# Patient Record
Sex: MALE | Race: WHITE | NOT HISPANIC OR LATINO | Employment: OTHER | URBAN - METROPOLITAN AREA
[De-identification: names, ages, dates, MRNs, and addresses within clinical notes are randomized per-mention and may not be internally consistent; named-entity substitution may affect disease eponyms.]

---

## 2018-01-03 ENCOUNTER — HOSPITAL ENCOUNTER (EMERGENCY)
Facility: HOSPITAL | Age: 83
Discharge: HOME | End: 2018-01-04
Attending: EMERGENCY MEDICINE
Payer: MEDICARE
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
## 2018-01-03 DIAGNOSIS — R55 SYNCOPE: ICD-10-CM

## 2018-01-03 DIAGNOSIS — E11.9 DM (DIABETES MELLITUS), TYPE 2: ICD-10-CM
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
## 2018-01-03 PROCEDURE — 99285 EMERGENCY DEPT VISIT HI MDM: CPT | Mod: 25

## 2018-01-03 PROCEDURE — 93005 ELECTROCARDIOGRAM TRACING: CPT

## 2018-01-03 PROCEDURE — 99285 EMERGENCY DEPT VISIT HI MDM: CPT | Mod: ,,, | Performed by: EMERGENCY MEDICINE

## 2018-01-03 PROCEDURE — 93010 ELECTROCARDIOGRAM REPORT: CPT | Mod: ,,, | Performed by: INTERNAL MEDICINE

## 2018-01-04 VITALS
SYSTOLIC BLOOD PRESSURE: 137 MMHG | DIASTOLIC BLOOD PRESSURE: 63 MMHG | OXYGEN SATURATION: 94 % | HEART RATE: 91 BPM | WEIGHT: 200 LBS | TEMPERATURE: 99 F | RESPIRATION RATE: 18 BRPM | BODY MASS INDEX: 27.09 KG/M2 | HEIGHT: 72 IN

## 2018-01-04 LAB
ANION GAP SERPL CALC-SCNC: 9 MMOL/L
BASOPHILS # BLD AUTO: 0.02 K/UL
BASOPHILS NFR BLD: 0.3 %
BILIRUB UR QL STRIP: NEGATIVE
BNP SERPL-MCNC: 241 PG/ML
BUN SERPL-MCNC: 28 MG/DL
CALCIUM SERPL-MCNC: 8.6 MG/DL
CHLORIDE SERPL-SCNC: 108 MMOL/L
CLARITY UR REFRACT.AUTO: CLEAR
CO2 SERPL-SCNC: 23 MMOL/L
COLOR UR AUTO: YELLOW
CREAT SERPL-MCNC: 1.1 MG/DL
DIFFERENTIAL METHOD: ABNORMAL
EOSINOPHIL # BLD AUTO: 0.1 K/UL
EOSINOPHIL NFR BLD: 1.5 %
ERYTHROCYTE [DISTWIDTH] IN BLOOD BY AUTOMATED COUNT: 13.5 %
EST. GFR  (AFRICAN AMERICAN): >60 ML/MIN/1.73 M^2
EST. GFR  (NON AFRICAN AMERICAN): >60 ML/MIN/1.73 M^2
GLUCOSE SERPL-MCNC: 103 MG/DL
GLUCOSE UR QL STRIP: NEGATIVE
HCT VFR BLD AUTO: 41.2 %
HGB BLD-MCNC: 14 G/DL
HGB UR QL STRIP: NEGATIVE
HYALINE CASTS UR QL AUTO: 3 /LPF
IMM GRANULOCYTES # BLD AUTO: 0.04 K/UL
IMM GRANULOCYTES NFR BLD AUTO: 0.5 %
KETONES UR QL STRIP: ABNORMAL
LEUKOCYTE ESTERASE UR QL STRIP: NEGATIVE
LYMPHOCYTES # BLD AUTO: 1 K/UL
LYMPHOCYTES NFR BLD: 13.3 %
MCH RBC QN AUTO: 32 PG
MCHC RBC AUTO-ENTMCNC: 34 G/DL
MCV RBC AUTO: 94 FL
MICROSCOPIC COMMENT: ABNORMAL
MONOCYTES # BLD AUTO: 0.9 K/UL
MONOCYTES NFR BLD: 11.9 %
NEUTROPHILS # BLD AUTO: 5.3 K/UL
NEUTROPHILS NFR BLD: 72.5 %
NITRITE UR QL STRIP: NEGATIVE
NRBC BLD-RTO: 0 /100 WBC
PH UR STRIP: 7 [PH] (ref 5–8)
PLATELET # BLD AUTO: 113 K/UL
PMV BLD AUTO: 11.2 FL
POTASSIUM SERPL-SCNC: 3.6 MMOL/L
PROT UR QL STRIP: NEGATIVE
RBC # BLD AUTO: 4.37 M/UL
RBC #/AREA URNS AUTO: 1 /HPF (ref 0–4)
SODIUM SERPL-SCNC: 140 MMOL/L
SP GR UR STRIP: 1.02 (ref 1–1.03)
URN SPEC COLLECT METH UR: ABNORMAL
UROBILINOGEN UR STRIP-ACNC: 2 EU/DL
WBC # BLD AUTO: 7.31 K/UL
WBC #/AREA URNS AUTO: 2 /HPF (ref 0–5)

## 2018-01-04 PROCEDURE — 25000003 PHARM REV CODE 250: Performed by: EMERGENCY MEDICINE

## 2018-01-04 PROCEDURE — 83880 ASSAY OF NATRIURETIC PEPTIDE: CPT

## 2018-01-04 PROCEDURE — 80048 BASIC METABOLIC PNL TOTAL CA: CPT

## 2018-01-04 PROCEDURE — 85025 COMPLETE CBC W/AUTO DIFF WBC: CPT

## 2018-01-04 PROCEDURE — 81001 URINALYSIS AUTO W/SCOPE: CPT

## 2018-01-04 RX ORDER — CARBIDOPA AND LEVODOPA 25; 100 MG/1; MG/1
1 TABLET, EXTENDED RELEASE ORAL 2 TIMES DAILY
Status: DISCONTINUED | OUTPATIENT
Start: 2018-01-04 | End: 2018-01-04

## 2018-01-04 RX ORDER — PRAMIPEXOLE DIHYDROCHLORIDE 1 MG/1
1 TABLET ORAL ONCE
Status: COMPLETED | OUTPATIENT
Start: 2018-01-04 | End: 2018-01-04

## 2018-01-04 RX ORDER — CARBIDOPA AND LEVODOPA 25; 100 MG/1; MG/1
1 TABLET, EXTENDED RELEASE ORAL ONCE
Status: COMPLETED | OUTPATIENT
Start: 2018-01-04 | End: 2018-01-04

## 2018-01-04 RX ORDER — PRAMIPEXOLE DIHYDROCHLORIDE 1 MG/1
1 TABLET ORAL 3 TIMES DAILY
Status: DISCONTINUED | OUTPATIENT
Start: 2018-01-04 | End: 2018-01-04

## 2018-01-04 RX ADMIN — PRAMIPEXOLE DIHYDROCHLORIDE 1 MG: 1 TABLET ORAL at 02:01

## 2018-01-04 RX ADMIN — CARBIDOPA AND LEVODOPA 1 TABLET: 25; 100 TABLET, EXTENDED RELEASE ORAL at 02:01

## 2018-01-04 NOTE — ED NOTES
"Pt arrive via EMS with C-collar in place. Pt reports fall but does not recall how long was on the floor. Per pt's daughter, the pt was found on the floor by the pt's son and pt c/o of head pain. Pt denies head pain currently but endorses back pain. Pt speech is difficult to understand, pt's daughter states "he also is missing his teeth so that is why he sounds that way". Pt able to state current month, name, and city.   "

## 2018-01-04 NOTE — ED PROVIDER NOTES
Encounter Date: 1/3/2018    SCRIBE #1 NOTE: I, Elizabeth Krishnamurthy, am scribing for, and in the presence of,  DAVE Louie have scribed the entire note.       History     Chief Complaint   Patient presents with    Loss of Consciousness     Family reports more confusion today and syncopal episode at home, no trauma noted. AAOx4 at this time. Pt reporting generalized chronic pain.     Time patient was seen by the provider: 12:15 AM      The patient is a 87 y.o. male with hx of: Afib, Diabetes mellitus, type 2, HLD, HTN, Parkinson disease, who presents to the ED with a complaint of fall from last night. Patient states he fell and hit the back if his head and had loss of consciousness. Patient reports of having head pain and leg swelling bilaterally, denies neck pain. Daughter of the patient reports that he had more confusion along with syncopal episode last night at home before the fall and has fallen multiple times in the past. Patient lives with his son and son's wife. Patient is placed in cervical collar.          The history is provided by the patient, medical records and a relative.     Review of patient's allergies indicates:   Allergen Reactions    Demerol [meperidine]      Past Medical History:   Diagnosis Date    *Atrial fibrillation     AI (aortic insufficiency)     mild    Atrial fibrillation     Diabetes mellitus     Diabetes mellitus, type 2     Hyperlipidemia     Hypertension     Memory loss     Parkinson disease     Spinal stenosis memory loss     Past Surgical History:   Procedure Laterality Date    CHOLECYSTECTOMY       No family history on file.  Social History   Substance Use Topics    Smoking status: Never Smoker    Smokeless tobacco: Never Used    Alcohol use No     Review of Systems   Constitutional: Negative for fever.   HENT: Negative for congestion.    Eyes: Negative for visual disturbance.   Respiratory: Negative for shortness of breath.    Cardiovascular: Positive for leg  swelling (bilaterally).   Gastrointestinal: Negative for abdominal pain.   Genitourinary: Negative for flank pain.   Musculoskeletal: Negative for neck pain.        (+) Head pain    Skin: Negative for rash.   Neurological: Positive for syncope.        (+) Loss of consciousness   Psychiatric/Behavioral: Positive for confusion.       Physical Exam     Initial Vitals [01/03/18 1949]   BP Pulse Resp Temp SpO2   (!) 148/98 86 18 99.1 °F (37.3 °C) 97 %      MAP       114.67         Physical Exam    Nursing note and vitals reviewed.  HENT:   Head: Normocephalic.   Eyes: EOM are normal.   Cardiovascular: Normal rate and regular rhythm.   Pulmonary/Chest: Breath sounds normal.   Abdominal: Soft.   Musculoskeletal: Normal range of motion.   (+) 2+ bilateral pitting edema and lower extremities up to mid shin    Neurological:   (+) Tremor noted on extremities, equal strength bilaterally in upper and lower extremities and all extremities with no limb drift   Skin: No rash noted.         ED Course   Procedures  Labs Reviewed   BASIC METABOLIC PANEL - Abnormal; Notable for the following:        Result Value    BUN, Bld 28 (*)     Calcium 8.6 (*)     All other components within normal limits   CBC W/ AUTO DIFFERENTIAL - Abnormal; Notable for the following:     RBC 4.37 (*)     MCH 32.0 (*)     Platelets 113 (*)     Lymph% 13.3 (*)     All other components within normal limits   URINALYSIS, REFLEX TO URINE CULTURE - Abnormal; Notable for the following:     Ketones, UA 1+ (*)     All other components within normal limits    Narrative:     Preferred Collection Type->Urine, Clean Catch   URINALYSIS MICROSCOPIC - Abnormal; Notable for the following:     Hyaline Casts, UA 3 (*)     All other components within normal limits    Narrative:     Preferred Collection Type->Urine, Clean Catch   B-TYPE NATRIURETIC PEPTIDE - Abnormal; Notable for the following:      (*)     All other components within normal limits   B-TYPE NATRIURETIC  PEPTIDE             Medical Decision Making:   History:   Old Medical Records: I decided to obtain old medical records.  Initial Assessment:   87 y.o. Male patient after fall with possible LOC, also with swelling on lower extremities, unclear etiology. Vital signs are stable. Patient mentation is at baseline. CT head and neck are negative for acute injury, x-ray of chest and pelvis no fracture or other injuries noted. Metallic object on pelvis x-ray found folded pocket knife sitting behind the patient. BNP is intermittent range, labs otherwise stable from previous other than BNP. Patient has no other symptoms of CHF(stable spO2, no dyspnea, normal CXR) and no admission needed for diuresis. Given no other injury and several children able to take care of him, patient is stable for discharge. Family has agreed to take him home.  Advised pt to follow up with PCP or return if concerning symptoms arise. Pt understands and agrees with plan. Will d/c home.          Independently Interpreted Test(s):   I have ordered and independently interpreted X-rays - see prior notes.  I have ordered and independently interpreted EKG Reading(s) - see prior notes  Clinical Tests:   Lab Tests: Ordered and Reviewed  Radiological Study: Ordered and Reviewed  Medical Tests: Ordered and Reviewed            Scribe Attestation:   Scribe #1: I performed the above scribed service and the documentation accurately describes the services I performed. I attest to the accuracy of the note.            ED Course      Clinical Impression:   Diagnoses of Syncope, Fall, Fall, Fall, and Fall were pertinent to this visit.    Disposition:   Disposition: Discharged  Condition: Stable                        Grey Gacria MD  01/04/18 0619

## 2018-01-04 NOTE — PROVIDER PROGRESS NOTES - EMERGENCY DEPT.
Encounter Date: 1/3/2018    ED Physician Progress Notes         EKG - STEMI Decision  STEMI: Taking into account all the artifacts and bad EKG because of pt's movement, no STEMI present.    I, Gera Vu, am scribing for, and in the presence of, Dr. Richter. I performed the above scribed service and the documentation accurately describes the services I performed. I attest to the accuracy of the note.

## 2018-01-04 NOTE — ED NOTES
Patient identifiers verified and correct for Silvano Parmar.    LOC: The patient is awake, alert and aware of environment with an appropriate affect, the patient is oriented x 3. Speech is difficult to understand.   APPEARANCE: Patient resting comfortably and in no acute distress, patient is clean and well groomed, patient's clothing is properly fastened.  SKIN: The skin is warm and dry, color consistent with ethnicity, patient has normal skin turgor and moist mucus membranes, skin intact, no breakdown or bruising noted.  MUSCULOSKELETAL: Patient moving all extremities spontaneously, no obvious swelling or deformities noted.  RESPIRATORY: Airway is open and patent, respirations are spontaneous, patient has a normal effort and rate, no accessory muscle use noted  CARDIAC: Patient has a normal rate and regular rhythm, BLE swelling and warm to touch, capillary refill < 3 seconds.  ABDOMEN: Soft and non tender to palpation, no distention noted, normoactive bowel sounds present in all four quadrants.  NEUROLOGIC: PERRL, 3mm bilaterally, eyes open spontaneously, behavior appropriate to situation, follows commands, facial expression symmetrical, bilateral hand grasp equal and even, purposeful motor response noted, normal sensation in all extremities when touched with a finger.

## 2018-01-04 NOTE — ED NOTES
Waiting on pt's son-in-law to arrive to assist pt into house. Pt unable to stand on own or hold self up. Charge nurse aware.

## 2018-05-02 ENCOUNTER — OFFICE VISIT (OUTPATIENT)
Dept: UROLOGY | Facility: CLINIC | Age: 83
End: 2018-05-02
Attending: UROLOGY
Payer: MEDICARE

## 2018-05-02 VITALS
WEIGHT: 204.38 LBS | SYSTOLIC BLOOD PRESSURE: 118 MMHG | HEART RATE: 42 BPM | DIASTOLIC BLOOD PRESSURE: 66 MMHG | BODY MASS INDEX: 27.68 KG/M2 | HEIGHT: 72 IN

## 2018-05-02 DIAGNOSIS — N40.1 BENIGN NON-NODULAR PROSTATIC HYPERPLASIA WITH LOWER URINARY TRACT SYMPTOMS: Primary | ICD-10-CM

## 2018-05-02 LAB
BILIRUB SERPL-MCNC: ABNORMAL MG/DL
BLOOD URINE, POC: ABNORMAL
COLOR, POC UA: YELLOW
GLUCOSE UR QL STRIP: ABNORMAL
KETONES UR QL STRIP: ABNORMAL
LEUKOCYTE ESTERASE URINE, POC: ABNORMAL
NITRITE, POC UA: ABNORMAL
PH, POC UA: 5
PROTEIN, POC: ABNORMAL
SPECIFIC GRAVITY, POC UA: 1.02
UROBILINOGEN, POC UA: 1

## 2018-05-02 PROCEDURE — 99203 OFFICE O/P NEW LOW 30 MIN: CPT | Mod: 25,S$GLB,, | Performed by: UROLOGY

## 2018-05-02 PROCEDURE — 81002 URINALYSIS NONAUTO W/O SCOPE: CPT | Mod: S$GLB,,, | Performed by: UROLOGY

## 2018-05-02 RX ORDER — TAMSULOSIN HYDROCHLORIDE 0.4 MG/1
0.4 CAPSULE ORAL DAILY
Qty: 90 CAPSULE | Refills: 3 | Status: SHIPPED | OUTPATIENT
Start: 2018-05-02 | End: 2019-05-29 | Stop reason: SDUPTHER

## 2018-05-02 NOTE — PROGRESS NOTES
Subjective:      Silvano Parmar is a 87 y.o. male who was self-referred for evaluation of BPH.      He previously saw Dr. Santo.    PMHx significant for Parkinson's disease.    He is on flomax for BPH.     He denies bothersome LUTS today. Has nocturia x2. He does wear a pad for urinary incontinence, but son does not think he leaks every day.     The following portions of the patient's history were reviewed and updated as appropriate: allergies, current medications, past family history, past medical history, past social history, past surgical history and problem list.    Review of Systems  Constitutional: no fever or chills  ENT: no nasal congestion or sore throat  Respiratory: no cough or shortness of breath  Cardiovascular: no chest pain or palpitations  Gastrointestinal: no nausea or vomiting, tolerating diet  Genitourinary: as per HPI  Hematologic/Lymphatic: no easy bruising or lymphadenopathy  Musculoskeletal: no arthralgias or myalgias  Behavioral/Psych: no auditory or visual hallucinations     Objective:   Vitals: /66 (BP Location: Left arm, Patient Position: Sitting, BP Method: Large (Automatic))   Pulse (!) 42   Ht 6' (1.829 m)   Wt 92.7 kg (204 lb 5.9 oz)   BMI 27.72 kg/m²     Physical Exam   General: alert and oriented, no acute distress  Head: normocephalic, atraumatic  Neck: supple, no lymphadenopathy, normal ROM, no masses  Respiratory: Symmetric expansion, non-labored breathing  Cardiovascular: regular rate and rhythm, nomal pulses, no peripheral edema  Skin: normal coloration and turgor, no rashes, no suspicious skin lesions noted  Neuro: alert and oriented x3, no gross deficits  Psych: normal judgment and insight, normal mood/affect and non-anxious    Lab Review   Urinalysis demonstrates negative for all components  Lab Results   Component Value Date    WBC 7.31 01/04/2018    HGB 14.0 01/04/2018    HCT 41.2 01/04/2018    MCV 94 01/04/2018     (L) 01/04/2018     Lab Results    Component Value Date    CREATININE 1.1 01/04/2018    BUN 28 (H) 01/04/2018       Assessment:     1. Benign non-nodular prostatic hyperplasia with lower urinary tract symptoms        Plan:   1. Continue flomax  2. FU 1 year

## 2018-11-09 ENCOUNTER — HOSPITAL ENCOUNTER (INPATIENT)
Facility: OTHER | Age: 83
LOS: 5 days | Discharge: NURSING, REHAB OR LONG-TERM CARE | DRG: 193 | End: 2018-11-14
Attending: EMERGENCY MEDICINE | Admitting: HOSPITALIST
Payer: MEDICARE

## 2018-11-09 DIAGNOSIS — J18.9 PNEUMONIA: ICD-10-CM

## 2018-11-09 DIAGNOSIS — J18.9 COMMUNITY ACQUIRED PNEUMONIA OF RIGHT UPPER LOBE OF LUNG: ICD-10-CM

## 2018-11-09 DIAGNOSIS — G20.A1 PARKINSON DISEASE: ICD-10-CM

## 2018-11-09 DIAGNOSIS — J18.9 PNEUMONIA OF RIGHT LUNG DUE TO INFECTIOUS ORGANISM, UNSPECIFIED PART OF LUNG: ICD-10-CM
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
## 2018-11-09 DIAGNOSIS — J96.01 ACUTE RESPIRATORY FAILURE WITH HYPOXIA: Primary | ICD-10-CM

## 2018-11-09 DIAGNOSIS — I38 ENDOCARDITIS: ICD-10-CM

## 2018-11-09 DIAGNOSIS — R78.81 BACTEREMIA: ICD-10-CM

## 2018-11-09 PROBLEM — D69.3 CHRONIC IDIOPATHIC THROMBOCYTOPENIA: Status: ACTIVE | Noted: 2018-11-09

## 2018-11-09 PROBLEM — E83.42 HYPOMAGNESEMIA: Status: ACTIVE | Noted: 2018-11-09

## 2018-11-09 LAB
ALBUMIN SERPL BCP-MCNC: 3.3 G/DL
ALP SERPL-CCNC: 99 U/L
ALT SERPL W/O P-5'-P-CCNC: <5 U/L
ANION GAP SERPL CALC-SCNC: 8 MMOL/L
AST SERPL-CCNC: 34 U/L
BASOPHILS # BLD AUTO: 0.02 K/UL
BASOPHILS NFR BLD: 0.1 %
BILIRUB SERPL-MCNC: 1 MG/DL
BILIRUB UR QL STRIP: NEGATIVE
BNP SERPL-MCNC: 332 PG/ML
BUN SERPL-MCNC: 18 MG/DL
CALCIUM SERPL-MCNC: 8.9 MG/DL
CHLORIDE SERPL-SCNC: 106 MMOL/L
CK SERPL-CCNC: 183 U/L
CLARITY UR: CLEAR
CO2 SERPL-SCNC: 26 MMOL/L
COLOR UR: YELLOW
CREAT SERPL-MCNC: 1.2 MG/DL
DIFFERENTIAL METHOD: ABNORMAL
EOSINOPHIL # BLD AUTO: 0 K/UL
EOSINOPHIL NFR BLD: 0.2 %
ERYTHROCYTE [DISTWIDTH] IN BLOOD BY AUTOMATED COUNT: 14.2 %
EST. GFR  (AFRICAN AMERICAN): >60 ML/MIN/1.73 M^2
EST. GFR  (NON AFRICAN AMERICAN): 54 ML/MIN/1.73 M^2
GLUCOSE SERPL-MCNC: 152 MG/DL
GLUCOSE UR QL STRIP: NEGATIVE
HCT VFR BLD AUTO: 42.8 %
HGB BLD-MCNC: 14.2 G/DL
HGB UR QL STRIP: NEGATIVE
KETONES UR QL STRIP: NEGATIVE
LACTATE SERPL-SCNC: 1.5 MMOL/L
LEUKOCYTE ESTERASE UR QL STRIP: NEGATIVE
LYMPHOCYTES # BLD AUTO: 1.4 K/UL
LYMPHOCYTES NFR BLD: 7.8 %
MAGNESIUM SERPL-MCNC: 1.5 MG/DL
MCH RBC QN AUTO: 32.3 PG
MCHC RBC AUTO-ENTMCNC: 33.2 G/DL
MCV RBC AUTO: 98 FL
MONOCYTES # BLD AUTO: 0.3 K/UL
MONOCYTES NFR BLD: 1.9 %
NEUTROPHILS # BLD AUTO: 15.5 K/UL
NEUTROPHILS NFR BLD: 89.8 %
NITRITE UR QL STRIP: NEGATIVE
PH UR STRIP: 7 [PH] (ref 5–8)
PLATELET # BLD AUTO: 127 K/UL
PMV BLD AUTO: 11.8 FL
POCT GLUCOSE: 144 MG/DL (ref 70–110)
POTASSIUM SERPL-SCNC: 3.5 MMOL/L
PROT SERPL-MCNC: 6.6 G/DL
PROT UR QL STRIP: NEGATIVE
RBC # BLD AUTO: 4.39 M/UL
SODIUM SERPL-SCNC: 140 MMOL/L
SP GR UR STRIP: <=1.005 (ref 1–1.03)
URN SPEC COLLECT METH UR: ABNORMAL
UROBILINOGEN UR STRIP-ACNC: 1 EU/DL
WBC # BLD AUTO: 17.26 K/UL

## 2018-11-09 PROCEDURE — 25000003 PHARM REV CODE 250: Performed by: HOSPITALIST

## 2018-11-09 PROCEDURE — 93010 ELECTROCARDIOGRAM REPORT: CPT | Mod: ,,, | Performed by: INTERNAL MEDICINE

## 2018-11-09 PROCEDURE — 80053 COMPREHEN METABOLIC PANEL: CPT

## 2018-11-09 PROCEDURE — 87186 SC STD MICRODIL/AGAR DIL: CPT

## 2018-11-09 PROCEDURE — 83880 ASSAY OF NATRIURETIC PEPTIDE: CPT

## 2018-11-09 PROCEDURE — 85025 COMPLETE CBC W/AUTO DIFF WBC: CPT

## 2018-11-09 PROCEDURE — 11000001 HC ACUTE MED/SURG PRIVATE ROOM

## 2018-11-09 PROCEDURE — 93005 ELECTROCARDIOGRAM TRACING: CPT

## 2018-11-09 PROCEDURE — 99285 EMERGENCY DEPT VISIT HI MDM: CPT | Mod: 25

## 2018-11-09 PROCEDURE — 87040 BLOOD CULTURE FOR BACTERIA: CPT

## 2018-11-09 PROCEDURE — 63600175 PHARM REV CODE 636 W HCPCS: Performed by: HOSPITALIST

## 2018-11-09 PROCEDURE — 96365 THER/PROPH/DIAG IV INF INIT: CPT

## 2018-11-09 PROCEDURE — 81003 URINALYSIS AUTO W/O SCOPE: CPT

## 2018-11-09 PROCEDURE — 83735 ASSAY OF MAGNESIUM: CPT

## 2018-11-09 PROCEDURE — 99900035 HC TECH TIME PER 15 MIN (STAT)

## 2018-11-09 PROCEDURE — 25000003 PHARM REV CODE 250: Performed by: EMERGENCY MEDICINE

## 2018-11-09 PROCEDURE — 94761 N-INVAS EAR/PLS OXIMETRY MLT: CPT

## 2018-11-09 PROCEDURE — 99223 1ST HOSP IP/OBS HIGH 75: CPT | Mod: AI,,, | Performed by: HOSPITALIST

## 2018-11-09 PROCEDURE — 63600175 PHARM REV CODE 636 W HCPCS: Performed by: EMERGENCY MEDICINE

## 2018-11-09 PROCEDURE — 87449 NOS EACH ORGANISM AG IA: CPT

## 2018-11-09 PROCEDURE — 82550 ASSAY OF CK (CPK): CPT

## 2018-11-09 PROCEDURE — 83605 ASSAY OF LACTIC ACID: CPT

## 2018-11-09 RX ORDER — BENZONATATE 100 MG/1
100 CAPSULE ORAL 3 TIMES DAILY PRN
Status: DISCONTINUED | OUTPATIENT
Start: 2018-11-09 | End: 2018-11-15 | Stop reason: HOSPADM

## 2018-11-09 RX ORDER — PRAMIPEXOLE DIHYDROCHLORIDE 0.5 MG/1
1 TABLET ORAL 3 TIMES DAILY
Status: DISCONTINUED | OUTPATIENT
Start: 2018-11-09 | End: 2018-11-15 | Stop reason: HOSPADM

## 2018-11-09 RX ORDER — ENOXAPARIN SODIUM 100 MG/ML
40 INJECTION SUBCUTANEOUS EVERY 24 HOURS
Status: DISCONTINUED | OUTPATIENT
Start: 2018-11-09 | End: 2018-11-10

## 2018-11-09 RX ORDER — ACETAMINOPHEN 325 MG/1
650 TABLET ORAL EVERY 4 HOURS PRN
Status: DISCONTINUED | OUTPATIENT
Start: 2018-11-09 | End: 2018-11-15 | Stop reason: HOSPADM

## 2018-11-09 RX ORDER — TAMSULOSIN HYDROCHLORIDE 0.4 MG/1
0.4 CAPSULE ORAL DAILY
Status: DISCONTINUED | OUTPATIENT
Start: 2018-11-10 | End: 2018-11-15 | Stop reason: HOSPADM

## 2018-11-09 RX ORDER — DILTIAZEM HYDROCHLORIDE 120 MG/1
120 CAPSULE, COATED, EXTENDED RELEASE ORAL DAILY
Status: DISCONTINUED | OUTPATIENT
Start: 2018-11-10 | End: 2018-11-15 | Stop reason: HOSPADM

## 2018-11-09 RX ORDER — PANTOPRAZOLE SODIUM 40 MG/1
40 TABLET, DELAYED RELEASE ORAL DAILY
Status: DISCONTINUED | OUTPATIENT
Start: 2018-11-10 | End: 2018-11-15 | Stop reason: HOSPADM

## 2018-11-09 RX ORDER — ASPIRIN 325 MG
325 TABLET, DELAYED RELEASE (ENTERIC COATED) ORAL DAILY
Status: DISCONTINUED | OUTPATIENT
Start: 2018-11-10 | End: 2018-11-15 | Stop reason: HOSPADM

## 2018-11-09 RX ORDER — GUAIFENESIN 600 MG/1
600 TABLET, EXTENDED RELEASE ORAL 2 TIMES DAILY
Status: DISCONTINUED | OUTPATIENT
Start: 2018-11-09 | End: 2018-11-15 | Stop reason: HOSPADM

## 2018-11-09 RX ORDER — CARBIDOPA AND LEVODOPA 25; 100 MG/1; MG/1
2 TABLET ORAL 3 TIMES DAILY
Status: DISCONTINUED | OUTPATIENT
Start: 2018-11-09 | End: 2018-11-15 | Stop reason: HOSPADM

## 2018-11-09 RX ORDER — HYDRALAZINE HYDROCHLORIDE 20 MG/ML
15 INJECTION INTRAMUSCULAR; INTRAVENOUS EVERY 4 HOURS PRN
Status: DISCONTINUED | OUTPATIENT
Start: 2018-11-09 | End: 2018-11-15 | Stop reason: HOSPADM

## 2018-11-09 RX ORDER — IBUPROFEN 200 MG
24 TABLET ORAL
Status: DISCONTINUED | OUTPATIENT
Start: 2018-11-09 | End: 2018-11-15 | Stop reason: HOSPADM

## 2018-11-09 RX ORDER — AZITHROMYCIN 250 MG/1
500 TABLET, FILM COATED ORAL
Status: COMPLETED | OUTPATIENT
Start: 2018-11-09 | End: 2018-11-09

## 2018-11-09 RX ORDER — SODIUM CHLORIDE 0.9 % (FLUSH) 0.9 %
5 SYRINGE (ML) INJECTION
Status: DISCONTINUED | OUTPATIENT
Start: 2018-11-09 | End: 2018-11-15 | Stop reason: HOSPADM

## 2018-11-09 RX ORDER — MAGNESIUM SULFATE HEPTAHYDRATE 40 MG/ML
2 INJECTION, SOLUTION INTRAVENOUS ONCE
Status: COMPLETED | OUTPATIENT
Start: 2018-11-09 | End: 2018-11-09

## 2018-11-09 RX ORDER — PRAMIPEXOLE DIHYDROCHLORIDE 0.5 MG/1
1 TABLET ORAL
Status: COMPLETED | OUTPATIENT
Start: 2018-11-09 | End: 2018-11-09

## 2018-11-09 RX ORDER — GLUCAGON 1 MG
1 KIT INJECTION
Status: DISCONTINUED | OUTPATIENT
Start: 2018-11-09 | End: 2018-11-15 | Stop reason: HOSPADM

## 2018-11-09 RX ORDER — LOSARTAN POTASSIUM 50 MG/1
50 TABLET ORAL DAILY
Status: DISCONTINUED | OUTPATIENT
Start: 2018-11-10 | End: 2018-11-15 | Stop reason: HOSPADM

## 2018-11-09 RX ORDER — ACETAMINOPHEN 325 MG/1
650 TABLET ORAL EVERY 8 HOURS PRN
Status: DISCONTINUED | OUTPATIENT
Start: 2018-11-09 | End: 2018-11-15 | Stop reason: HOSPADM

## 2018-11-09 RX ORDER — ALBUTEROL SULFATE 0.83 MG/ML
2.5 SOLUTION RESPIRATORY (INHALATION) EVERY 4 HOURS PRN
Status: DISCONTINUED | OUTPATIENT
Start: 2018-11-09 | End: 2018-11-09 | Stop reason: CLARIF

## 2018-11-09 RX ORDER — GABAPENTIN 300 MG/1
300 CAPSULE ORAL 2 TIMES DAILY
Status: DISCONTINUED | OUTPATIENT
Start: 2018-11-09 | End: 2018-11-15 | Stop reason: HOSPADM

## 2018-11-09 RX ORDER — INSULIN ASPART 100 [IU]/ML
0-5 INJECTION, SOLUTION INTRAVENOUS; SUBCUTANEOUS
Status: DISCONTINUED | OUTPATIENT
Start: 2018-11-09 | End: 2018-11-15 | Stop reason: HOSPADM

## 2018-11-09 RX ORDER — CARBIDOPA AND LEVODOPA 25; 100 MG/1; MG/1
2 TABLET ORAL
Status: COMPLETED | OUTPATIENT
Start: 2018-11-09 | End: 2018-11-09

## 2018-11-09 RX ORDER — FLUTICASONE PROPIONATE 50 MCG
1 SPRAY, SUSPENSION (ML) NASAL DAILY
Status: DISCONTINUED | OUTPATIENT
Start: 2018-11-10 | End: 2018-11-15 | Stop reason: HOSPADM

## 2018-11-09 RX ORDER — FUROSEMIDE 20 MG/1
20 TABLET ORAL DAILY
Status: DISCONTINUED | OUTPATIENT
Start: 2018-11-10 | End: 2018-11-15 | Stop reason: HOSPADM

## 2018-11-09 RX ORDER — ALBUTEROL SULFATE 0.83 MG/ML
2.5 SOLUTION RESPIRATORY (INHALATION) EVERY 4 HOURS PRN
Status: DISCONTINUED | OUTPATIENT
Start: 2018-11-09 | End: 2018-11-15 | Stop reason: HOSPADM

## 2018-11-09 RX ORDER — IBUPROFEN 200 MG
16 TABLET ORAL
Status: DISCONTINUED | OUTPATIENT
Start: 2018-11-09 | End: 2018-11-15 | Stop reason: HOSPADM

## 2018-11-09 RX ADMIN — ENOXAPARIN SODIUM 40 MG: 100 INJECTION SUBCUTANEOUS at 06:11

## 2018-11-09 RX ADMIN — PRAMIPEXOLE DIHYDROCHLORIDE 1 MG: 0.5 TABLET ORAL at 01:11

## 2018-11-09 RX ADMIN — CARBIDOPA AND LEVODOPA 2 TABLET: 25; 100 TABLET ORAL at 09:11

## 2018-11-09 RX ADMIN — GABAPENTIN 300 MG: 300 CAPSULE ORAL at 09:11

## 2018-11-09 RX ADMIN — CEFTRIAXONE 1 G: 1 INJECTION, SOLUTION INTRAVENOUS at 03:11

## 2018-11-09 RX ADMIN — CARBIDOPA AND LEVODOPA 2 TABLET: 25; 100 TABLET ORAL at 01:11

## 2018-11-09 RX ADMIN — AZITHROMYCIN 500 MG: 250 TABLET, FILM COATED ORAL at 03:11

## 2018-11-09 RX ADMIN — GUAIFENESIN 600 MG: 600 TABLET, EXTENDED RELEASE ORAL at 09:11

## 2018-11-09 RX ADMIN — MAGNESIUM SULFATE IN WATER 2 G: 40 INJECTION, SOLUTION INTRAVENOUS at 06:11

## 2018-11-09 RX ADMIN — PRAMIPEXOLE DIHYDROCHLORIDE 1 MG: 0.5 TABLET ORAL at 09:11

## 2018-11-09 NOTE — ASSESSMENT & PLAN NOTE
-History noted.  Previously was on anticoagulation but due to many falls and at least one subdural hematoma anticoagulation was discontinued and felt unsafe for him  -Monitor on tele  -Continue home diltiazem

## 2018-11-09 NOTE — SUBJECTIVE & OBJECTIVE
Past Medical History:   Diagnosis Date    *Atrial fibrillation     AI (aortic insufficiency)     mild    Atrial fibrillation     Diabetes mellitus     Diabetes mellitus, type 2     Hyperlipidemia     Hypertension     Memory loss     Parkinson disease     Spinal stenosis memory loss       Past Surgical History:   Procedure Laterality Date    CHOLECYSTECTOMY         Review of patient's allergies indicates:   Allergen Reactions    Demerol [meperidine]        No current facility-administered medications on file prior to encounter.      Current Outpatient Medications on File Prior to Encounter   Medication Sig    aspirin (ECOTRIN) 325 MG EC tablet Take 1 tablet (325 mg total) by mouth once daily.    carbidopa-levodopa  mg (SINEMET)  mg per tablet Take 2 tablets by mouth 3 (three) times daily.     cholecalciferol, vitamin D3, (VITAMIN D3) 2,000 unit Tab Take 2 tablets by mouth once daily.     esomeprazole (NEXIUM) 40 MG capsule Take 22.3 mg by mouth before breakfast.     furosemide (LASIX) 20 MG tablet TAKE 1 TABLET BY MOUTH EVERY DAY    gabapentin (NEURONTIN) 300 MG capsule Take 1 capsule (300 mg total) by mouth 2 (two) times daily.    meloxicam (MOBIC) 15 MG tablet TAKE 1 TABLET(15 MG) BY MOUTH EVERY DAY FOR BACK OR LEG PAIN    pramipexole (MIRAPEX) 1 MG tablet Take 1 tablet by mouth 3 (three) times daily.    SITagliptan (JANUVIA) 50 MG Tab Take 1 tablet (50 mg total) by mouth once daily.    diltiaZEM (CARDIZEM CD) 120 MG Cp24 TAKE ONE CAPSULE BY MOUTH ONCE DAILY    fluticasone (FLONASE) 50 mcg/actuation nasal spray 1 spray (50 mcg total) by Each Nare route once daily.    FOLIC ACID/MULTIVITS-MIN/LUT (CENTRUM SILVER ORAL) Take 1 tablet by mouth once daily.    GENERLAC 10 gram/15 mL solution     losartan (COZAAR) 50 MG tablet TAKE 1 TABLET(50 MG) BY MOUTH EVERY DAY    MUCINEX DM  mg per 12 hr tablet TK UTD    tamsulosin (FLOMAX) 0.4 mg Cp24 Take 1 capsule (0.4 mg total) by  mouth once daily.     Family History     None        Tobacco Use    Smoking status: Never Smoker    Smokeless tobacco: Never Used   Substance and Sexual Activity    Alcohol use: No    Drug use: No    Sexual activity: No     Review of Systems   Constitutional: Negative for activity change and appetite change.   HENT: Negative for congestion, dental problem and trouble swallowing.    Eyes: Negative for discharge and itching.   Respiratory: Negative for apnea and chest tightness.    Cardiovascular: Positive for leg swelling. Negative for chest pain and palpitations.   Gastrointestinal: Negative for abdominal distention and abdominal pain.   Endocrine: Negative for cold intolerance and heat intolerance.   Genitourinary: Negative for difficulty urinating and dysuria.   Musculoskeletal: Positive for back pain. Negative for arthralgias.   Neurological: Negative for dizziness and facial asymmetry.   Psychiatric/Behavioral: Negative for agitation and behavioral problems.     Objective:     Vital Signs (Most Recent):  Temp: 98.3 °F (36.8 °C) (11/09/18 1318)  Pulse: 68 (11/09/18 1507)  Resp: 20 (11/09/18 1411)  BP: (!) 177/79 (11/09/18 1439)  SpO2: (!) 94 % (11/09/18 1507) Vital Signs (24h Range):  Temp:  [98.2 °F (36.8 °C)-98.3 °F (36.8 °C)] 98.3 °F (36.8 °C)  Pulse:  [68-72] 68  Resp:  [20-28] 20  SpO2:  [86 %-95 %] 94 %  BP: (119-177)/(75-85) 177/79     Weight: 97.5 kg (215 lb)  Body mass index is 29.16 kg/m².    Physical Exam   Constitutional: He is oriented to person, place, and time. He appears well-developed and well-nourished.   HENT:   Head: Normocephalic and atraumatic.   Eyes: EOM are normal. Pupils are equal, round, and reactive to light.   Neck: Normal range of motion. Neck supple.   Cardiovascular: Normal rate, regular rhythm and normal heart sounds.   Pulmonary/Chest: Effort normal. No respiratory distress.   Mild course sounds in right upper lobe.  No wheezes.  Overall good air movement.   Abdominal: Soft.  Bowel sounds are normal. He exhibits no distension. There is no tenderness.   Musculoskeletal: Normal range of motion. He exhibits edema.   Significant chronic bilateral lower extremity edema   Neurological: He is oriented to person, place, and time. No cranial nerve deficit. Coordination normal.   Slight tremor and mild dysarthria but at baseline per son at bedside.  Moves all extremities with good strength.   Skin: Skin is warm and dry.   Psychiatric: He has a normal mood and affect. His behavior is normal.   Vitals reviewed.        CRANIAL NERVES     CN III, IV, VI   Pupils are equal, round, and reactive to light.  Extraocular motions are normal.        Significant Labs:   BMP:   Recent Labs   Lab 11/09/18  1156   *      K 3.5      CO2 26   BUN 18   CREATININE 1.2   CALCIUM 8.9   MG 1.5*     CBC:   Recent Labs   Lab 11/09/18  1156   WBC 17.26*   HGB 14.2   HCT 42.8   *     CMP:   Recent Labs   Lab 11/09/18  1156      K 3.5      CO2 26   *   BUN 18   CREATININE 1.2   CALCIUM 8.9   PROT 6.6   ALBUMIN 3.3*   BILITOT 1.0   ALKPHOS 99   AST 34   ALT <5*   ANIONGAP 8   EGFRNONAA 54*     Lactic Acid:   Recent Labs   Lab 11/09/18  1419   LACTATE 1.5     Magnesium:   Recent Labs   Lab 11/09/18  1156   MG 1.5*     Urine Studies:   Recent Labs   Lab 11/09/18  1340   COLORU Yellow   APPEARANCEUA Clear   PHUR 7.0   SPECGRAV <=1.005*   PROTEINUA Negative   GLUCUA Negative   KETONESU Negative   BILIRUBINUA Negative   OCCULTUA Negative   NITRITE Negative   UROBILINOGEN 1.0   LEUKOCYTESUR Negative       Significant Imaging: I have reviewed and interpreted all pertinent imaging results/findings within the past 24 hours.

## 2018-11-09 NOTE — ED NOTES
Pt resting in bed with eyes open, respirations even and unlabored, appears in no acute distress. Family remains at bedside.

## 2018-11-09 NOTE — ED TRIAGE NOTES
Family reports pt had 3 witnessed falls today. Pt denies any falls. Family states that pt is unable to walk right now or use his legs. States that pt is usually able to ambulate with walker. Pt denies any complaints at this time. Pt currently orients to person, place, time. Answers all questions appropriately.

## 2018-11-09 NOTE — H&P
[facility] Medicine  History & Physical    Patient Name: Silvano Parmar  MRN: [id number]  Admission Date: 11/9/2018  Attending Physician: Silvano Mac MD   Primary Care Provider: Silvestre Ramirez MD         Patient information was obtained from patient, relative(s) and ER records.     Subjective:     Principal Problem:Community acquired pneumonia of right upper lobe of lung    Chief Complaint:   Chief Complaint   Patient presents with    Fall     pt with  multiple falls last pm         HPI: Mr. Parmar is an 88 year old man with a history of parkinsons disease, atrial fibrillation, diabetes, spinal stenosis and frequent falls who was found on the floor by his bed this morning at 6:30AM by the staff at [facility] where he resides.  He states he simply slid out of bed to the floor when getting out of be and was unable to get up off the floor.  He denied any injury.  Unfortunately, he was found on the floor again two other times today.  He has no recollection of how those occurred.  His son is at the bedside and informs me he has a long history of falls, and at one point suffered a small subdural hematoma.  In general Mr. Parmar ambulates with a walker.  His only complaint at this time is that he has a bit of an increase in right hip discomfort and right thorax discomfort.  He denies knowledge of having fallen on these areas today.He denies any fevers, cough, shortness of breath, loss of consciousness, headache, neck stiffness, palpitations or new weakness.      In the ER he was found to have a right upper lobe infiltrate on chest xray, a leukocytosis and mild asymptomatic hypoxia and is being admitted for treatment of community acquired pneumonia.    Past Medical History:   Diagnosis Date    *Atrial fibrillation     AI (aortic insufficiency)     mild    Atrial fibrillation     Diabetes mellitus     Diabetes mellitus, type 2     Hyperlipidemia     Hypertension     Memory  loss     Parkinson disease     Spinal stenosis memory loss       Past Surgical History:   Procedure Laterality Date    CHOLECYSTECTOMY         Review of patient's allergies indicates:   Allergen Reactions    Demerol [meperidine]        No current facility-administered medications on file prior to encounter.      Current Outpatient Medications on File Prior to Encounter   Medication Sig    aspirin (ECOTRIN) 325 MG EC tablet Take 1 tablet (325 mg total) by mouth once daily.    carbidopa-levodopa  mg (SINEMET)  mg per tablet Take 2 tablets by mouth 3 (three) times daily.     cholecalciferol, vitamin D3, (VITAMIN D3) 2,000 unit Tab Take 2 tablets by mouth once daily.     esomeprazole (NEXIUM) 40 MG capsule Take 22.3 mg by mouth before breakfast.     furosemide (LASIX) 20 MG tablet TAKE 1 TABLET BY MOUTH EVERY DAY    gabapentin (NEURONTIN) 300 MG capsule Take 1 capsule (300 mg total) by mouth 2 (two) times daily.    meloxicam (MOBIC) 15 MG tablet TAKE 1 TABLET(15 MG) BY MOUTH EVERY DAY FOR BACK OR LEG PAIN    pramipexole (MIRAPEX) 1 MG tablet Take 1 tablet by mouth 3 (three) times daily.    SITagliptan (JANUVIA) 50 MG Tab Take 1 tablet (50 mg total) by mouth once daily.    diltiaZEM (CARDIZEM CD) 120 MG Cp24 TAKE ONE CAPSULE BY MOUTH ONCE DAILY    fluticasone (FLONASE) 50 mcg/actuation nasal spray 1 spray (50 mcg total) by Each Nare route once daily.    FOLIC ACID/MULTIVITS-MIN/LUT (CENTRUM SILVER ORAL) Take 1 tablet by mouth once daily.    GENERLAC 10 gram/15 mL solution     losartan (COZAAR) 50 MG tablet TAKE 1 TABLET(50 MG) BY MOUTH EVERY DAY    MUCINEX DM  mg per 12 hr tablet TK UTD    tamsulosin (FLOMAX) 0.4 mg Cp24 Take 1 capsule (0.4 mg total) by mouth once daily.     Family History     None        Tobacco Use    Smoking status: Never Smoker    Smokeless tobacco: Never Used   Substance and Sexual Activity    Alcohol use: No    Drug use: No    Sexual activity: No      Review of Systems   Constitutional: Negative for activity change and appetite change.   HENT: Negative for congestion, dental problem and trouble swallowing.    Eyes: Negative for discharge and itching.   Respiratory: Negative for apnea and chest tightness.    Cardiovascular: Positive for leg swelling. Negative for chest pain and palpitations.   Gastrointestinal: Negative for abdominal distention and abdominal pain.   Endocrine: Negative for cold intolerance and heat intolerance.   Genitourinary: Negative for difficulty urinating and dysuria.   Musculoskeletal: Positive for back pain. Negative for arthralgias.   Neurological: Negative for dizziness and facial asymmetry.   Psychiatric/Behavioral: Negative for agitation and behavioral problems.     Objective:     Vital Signs (Most Recent):  Temp: 98.3 °F (36.8 °C) (11/09/18 1318)  Pulse: 68 (11/09/18 1507)  Resp: 20 (11/09/18 1411)  BP: (!) 177/79 (11/09/18 1439)  SpO2: (!) 94 % (11/09/18 1507) Vital Signs (24h Range):  Temp:  [98.2 °F (36.8 °C)-98.3 °F (36.8 °C)] 98.3 °F (36.8 °C)  Pulse:  [68-72] 68  Resp:  [20-28] 20  SpO2:  [86 %-95 %] 94 %  BP: (119-177)/(75-85) 177/79     Weight: 97.5 kg (215 lb)  Body mass index is 29.16 kg/m².    Physical Exam   Constitutional: He is oriented to person, place, and time. He appears well-developed and well-nourished.   HENT:   Head: Normocephalic and atraumatic.   Eyes: EOM are normal. Pupils are equal, round, and reactive to light.   Neck: Normal range of motion. Neck supple.   Cardiovascular: Normal rate, regular rhythm and normal heart sounds.   Pulmonary/Chest: Effort normal. No respiratory distress.   Mild course sounds in right upper lobe.  No wheezes.  Overall good air movement.   Abdominal: Soft. Bowel sounds are normal. He exhibits no distension. There is no tenderness.   Musculoskeletal: Normal range of motion. He exhibits edema.   Significant chronic bilateral lower extremity edema   Neurological: He is oriented  to person, place, and time. No cranial nerve deficit. Coordination normal.   Slight tremor and mild dysarthria but at baseline per son at bedside.  Moves all extremities with good strength.   Skin: Skin is warm and dry.   Psychiatric: He has a normal mood and affect. His behavior is normal.   Vitals reviewed.        CRANIAL NERVES     CN III, IV, VI   Pupils are equal, round, and reactive to light.  Extraocular motions are normal.        Significant Labs:   BMP:   Recent Labs   Lab 11/09/18  1156   *      K 3.5      CO2 26   BUN 18   CREATININE 1.2   CALCIUM 8.9   MG 1.5*     CBC:   Recent Labs   Lab 11/09/18  1156   WBC 17.26*   HGB 14.2   HCT 42.8   *     CMP:   Recent Labs   Lab 11/09/18  1156      K 3.5      CO2 26   *   BUN 18   CREATININE 1.2   CALCIUM 8.9   PROT 6.6   ALBUMIN 3.3*   BILITOT 1.0   ALKPHOS 99   AST 34   ALT <5*   ANIONGAP 8   EGFRNONAA 54*     Lactic Acid:   Recent Labs   Lab 11/09/18  1419   LACTATE 1.5     Magnesium:   Recent Labs   Lab 11/09/18  1156   MG 1.5*     Urine Studies:   Recent Labs   Lab 11/09/18  1340   COLORU Yellow   APPEARANCEUA Clear   PHUR 7.0   SPECGRAV <=1.005*   PROTEINUA Negative   GLUCUA Negative   KETONESU Negative   BILIRUBINUA Negative   OCCULTUA Negative   NITRITE Negative   UROBILINOGEN 1.0   LEUKOCYTESUR Negative       Significant Imaging: I have reviewed and interpreted all pertinent imaging results/findings within the past 24 hours.    Assessment/Plan:     * Community acquired pneumonia of right upper lobe of lung    -Admit to inpatient telemetry status  -Has infiltrate in RUL, leukocytosis and mild hypoxia but is afebrile without cough  -Order blood culture, sputum culture, rapid flu, urine legionella  -Provide tessalon perles, guaifenesin and albuterol nebulizers as needed  -Treat with rocephin and azithromycin for now.     Acute respiratory failure with hypoxia    -This is mild and without symptoms and actually may  be due to poor connectivity between the pulseox and his skin  -Continue treatment of pneumonia and provide supplemental O2  -Albuterol as needed  -Pulse ox q4h.  -He is DNR        Hypomagnesemia    -Replace magnesium today.  -Repeat labs in AM.         Chronic idiopathic thrombocytopenia    -Platelets mildly low since at least 2015.  -Appear stable and near baseline  -Monitor closely while in house       Spinal stenosis    -History noted       GERD (gastroesophageal reflux disease)    -Noted  -Continue home PPI       Benign prostatic hyperplasia    -History noted  -Continue flomax       HTN (hypertension)    -Continue home diltiazem  -Provide hydralazine if needed for SBP >180       DM (diabetes mellitus), type 2    -Check A1c  -Hold sitagliptin for now  -Provide diabetic diet  -Treat with SSI ac/hs for now.       Hyperlipemia    -History noted  -Not on treatment at this time       Parkinson disease    -Noted and appears stable  -Will continue home pramipexol       A-fib    -History noted.  Previously was on anticoagulation but due to many falls and at least one subdural hematoma anticoagulation was discontinued and felt unsafe for him  -Monitor on tele  -Continue home diltiazem         VTE Risk Mitigation (From admission, onward)    None             Silvano Mac MD  Department of Hospital Medicine   [facility]

## 2018-11-09 NOTE — ASSESSMENT & PLAN NOTE
-Platelets mildly low since at least 2015.  -Appear stable and near baseline  -Monitor closely while in house

## 2018-11-09 NOTE — ED PROVIDER NOTES
"Encounter Date: 11/9/2018    SCRIBE #1 NOTE: I, Daxa Quiroga, jaja scribing for, and in the presence of, Dr. Mckeon.       History     Chief Complaint   Patient presents with    Fall     pt with  multiple falls last pm      Time seen by provider: 11:20 AM    This is a 88 y.o. male with hx of HTN, HLD, DM, BPH, spinal stenosis, Parkinson disease, short term memory loss, and A-Fib (not on anticoagulation due to frequent falls) who presents due to fall this morning. Pt was found on the floor at 6:30 AM by caretaker. He reports sliding off his bed onto the floor and landing on his buttocks when trying to get up to go to the bathroom. Pt denies any pain or head trauma. He reports no HA, dizziness, lightheadedness, weakness, numbness, LOC, and N/V. No neck pain, neck stiffness, wound, facial swelling, dental problem, epistaxis, or hematuria. He has chronic intermittent lower back pain, but pt states he has no pain at the moment. Per son, pt has hx of similar episodes where "his legs don't work like they should", with no clear etiology on previous workups, and pt states that he had no pain or weakness to the BLE but that "they just don't hold me up." Pt felt at baseline last night. He is able to ambulate with a walker at baseline. He reports no recent illness; no change in appetite, fever, chills, nasal congestion, abdominal pain, diarrhea, constipation, cough, or urinary sx. No chest pain or SOB. He has chronic BLE swelling, unchanged. Per son, pt is a poor historian.      The history is provided by the patient and a relative (son).     Review of patient's allergies indicates:   Allergen Reactions    Demerol [meperidine]      Past Medical History:   Diagnosis Date    *Atrial fibrillation     AI (aortic insufficiency)     mild    Atrial fibrillation     Diabetes mellitus     Diabetes mellitus, type 2     Hyperlipidemia     Hypertension     Memory loss     Parkinson disease     Spinal stenosis memory loss "     Past Surgical History:   Procedure Laterality Date    CHOLECYSTECTOMY       No family history on file.  Social History     Tobacco Use    Smoking status: Never Smoker    Smokeless tobacco: Never Used   Substance Use Topics    Alcohol use: No    Drug use: No     Review of Systems   Constitutional: Negative for appetite change, chills and fever.   HENT: Negative for congestion, dental problem, facial swelling, nosebleeds, rhinorrhea and sore throat.    Eyes: Negative for photophobia and visual disturbance.   Respiratory: Negative for cough and shortness of breath.    Cardiovascular: Positive for leg swelling (chronic, unchanged). Negative for chest pain and palpitations.   Gastrointestinal: Negative for abdominal pain, constipation, diarrhea, nausea and vomiting.   Endocrine: Negative for polyuria.   Genitourinary: Negative for decreased urine volume, difficulty urinating, dysuria, frequency, hematuria and urgency.   Musculoskeletal: Negative for back pain, neck pain and neck stiffness.   Skin: Negative for rash and wound.   Allergic/Immunologic: Negative for immunocompromised state.   Neurological: Negative for dizziness, syncope, weakness, light-headedness, numbness and headaches.   Hematological: Does not bruise/bleed easily.   Psychiatric/Behavioral: Negative for confusion.       Physical Exam     Initial Vitals [11/09/18 1106]   BP Pulse Resp Temp SpO2   132/75 72 (!) 28 98.2 °F (36.8 °C) (!) 88 %      MAP       --         Physical Exam    Nursing note and vitals reviewed.  Constitutional: He appears well-developed and well-nourished. He is not diaphoretic. No distress.   HENT:   Head: Normocephalic and atraumatic.   Right Ear: External ear normal.   Left Ear: External ear normal.   No sign head trauma.    Eyes: Right eye exhibits no discharge. Left eye exhibits no discharge.   Neck: Normal range of motion. Neck supple.   Cardiovascular: Normal rate, regular rhythm, normal heart sounds and intact distal  pulses. Exam reveals no gallop and no friction rub.    No murmur heard.  Pulmonary/Chest: No respiratory distress. He has no wheezes. He has no rhonchi. He has no rales. He exhibits no tenderness.   Coarse breath sounds on R upper lung.    Abdominal: Soft. Bowel sounds are normal. He exhibits no distension. There is no tenderness. There is no rebound and no guarding.   Musculoskeletal: Normal range of motion.   Minimal c-spine tenderness. BLE with chronic edema.   Lymphadenopathy:     He has no cervical adenopathy.   Neurological: He is alert and oriented to person, place, and time. No cranial nerve deficit.   4+/5 strength to BLE.   Skin: Skin is warm and dry. No rash noted. No erythema.   Psychiatric: He has a normal mood and affect.         ED Course   Procedures  Labs Reviewed   CBC W/ AUTO DIFFERENTIAL - Abnormal; Notable for the following components:       Result Value    WBC 17.26 (*)     RBC 4.39 (*)     MCH 32.3 (*)     Platelets 127 (*)     Gran # (ANC) 15.5 (*)     Gran% 89.8 (*)     Lymph% 7.8 (*)     Mono% 1.9 (*)     All other components within normal limits   COMPREHENSIVE METABOLIC PANEL - Abnormal; Notable for the following components:    Glucose 152 (*)     Albumin 3.3 (*)     ALT <5 (*)     eGFR if non  54 (*)     All other components within normal limits   URINALYSIS, REFLEX TO URINE CULTURE - Abnormal; Notable for the following components:    Specific Gravity, UA <=1.005 (*)     All other components within normal limits    Narrative:     Preferred Collection Type->Urine, Clean Catch   MAGNESIUM - Abnormal; Notable for the following components:    Magnesium 1.5 (*)     All other components within normal limits   B-TYPE NATRIURETIC PEPTIDE - Abnormal; Notable for the following components:     (*)     All other components within normal limits   CULTURE, BLOOD   CULTURE, BLOOD   CK   LACTIC ACID, PLASMA     EKG Readings: (Independently Interpreted)   Paced rhythm at 70 bpm. No  STEMI.       Imaging Results          X-Ray Lumbar Spine Ap And Lateral (Final result)  Result time 11/09/18 13:17:17    Final result by Armando Canales MD (11/09/18 13:17:17)                 Impression:      As above.      Electronically signed by: Armando Canales MD  Date:    11/09/2018  Time:    13:17             Narrative:    EXAMINATION:  XR LUMBAR SPINE AP AND LATERAL    CLINICAL HISTORY:  Low back pain, minor trauma;Unspecified fall, initial encounter    TECHNIQUE:  AP, lateral and spot images were performed of the lumbar spine.    COMPARISON:  None    FINDINGS:  Alignment: Grade 1 anterolisthesis noted at L4-L5.    Vertebrae: There is mild height loss of the L1 vertebral body.  No suspicious appearing lytic or blastic lesions.    Discs and facets: Multilevel mild-to-moderate disc height loss noted.  Lower lumbar facet arthrosis noted.  Facet joints are unremarkable.    Miscellaneous: No additional findings.                               X-Ray Pelvis Routine AP (Final result)  Result time 11/09/18 13:16:19    Final result by Armando Canales MD (11/09/18 13:16:19)                 Impression:      As above.      Electronically signed by: Armando Canales MD  Date:    11/09/2018  Time:    13:16             Narrative:    EXAMINATION:  XR PELVIS ROUTINE AP    CLINICAL HISTORY:  Fall;    TECHNIQUE:  AP view of the pelvis was performed.    COMPARISON:  01/04/2018    FINDINGS:  No evidence for pelvic fracture.  No lytic or blastic lesion.  Hip cartilage spaces are mildly narrowed, with osteophyte production.  Degenerative changes are noted in the lower lumbar spine.  Vascular calcifications are noted.                               X-Ray Chest 1 View (Final result)  Result time 11/09/18 13:11:35    Final result by Clay Lemus MD (11/09/18 13:11:35)                 Impression:      Patchy consolidation throughout the right lung could relate to aspiration or pneumonia.      Electronically signed by: Clay Lemus  MD  Date:    11/09/2018  Time:    13:11             Narrative:    EXAMINATION:  XR CHEST 1 VIEW    CLINICAL HISTORY:  Unspecified fall, initial encounter    TECHNIQUE:  Single frontal view of the chest was performed.    COMPARISON:  01/04/2018    FINDINGS:  Stable enlargement cardiomediastinal silhouette.  There is a left-sided pacemaker which is in stable position.  There is aortic atherosclerosis.  Left lung is relatively clear and well expanded.  There is patchy consolidation throughout the right lung.  No evidence of pneumothorax or large pleural effusion.                               CT Head Without Contrast (Final result)  Result time 11/09/18 13:02:03    Final result by Armando Canales MD (11/09/18 13:02:03)                 Impression:      Sequela of chronic microvascular disease.      Electronically signed by: Armando Canales MD  Date:    11/09/2018  Time:    13:02             Narrative:    EXAMINATION:  CT HEAD WITHOUT CONTRAST    CLINICAL HISTORY:  Fall;    TECHNIQUE:  Low dose axial CT images obtained throughout the head without intravenous contrast. Sagittal and coronal reconstructions were performed.    COMPARISON:  01/04/2018    FINDINGS:  Intracranial compartment:    Involutional changes are noted.  No hydrocephalus.  No extra-axial blood or fluid collections.    Periventricular white matter hypoattenuation in keeping with chronic microvascular disease.  Chronic lacunar infarct noted in the right basal ganglia.  No parenchymal mass, hemorrhage, edema or major vascular distribution infarct.    Skull/extracranial contents (limited evaluation): No fracture. Mastoid air cells are clear.  Note made of opacification of left posterior ethmoid air cell.                               CT Cervical Spine Without Contrast (Final result)  Result time 11/09/18 13:13:05    Final result by Bradford Louis MD (11/09/18 13:13:05)                 Impression:      No evidence of acute fracture or traumatic  malalignment.    Moderate cervical spondylosis, similar to prior exam.    Patchy ground-glass pulmonary parenchymal opacity in the partially visualized right lung apex.  This is likely infectious or inflammatory in nature.  Clinical correlation advised.      Electronically signed by: Bradford Louis MD  Date:    11/09/2018  Time:    13:13             Narrative:    EXAMINATION:  CT CERVICAL SPINE WITHOUT CONTRAST    CLINICAL HISTORY:  Neck pain, first study;    TECHNIQUE:  Low dose axial CT images through the cervical spine, with sagittal and coronal reformations.  Contrast was not administered.    COMPARISON:  01/04/2018    FINDINGS:  The vertebral bodies are stable in height and morphology without evidence of fracture or osseous destructive process.  There is apparent fusion at C3-4 and C5-6.    Slight anterolisthesis at C4-5 and C7-T1, similar to prior.    Moderate cervical spondylosis.  Loss of disc height at C4-5 and C6-7.  Moderate multilevel facet arthropathy.    Limited evaluation of the intraspinal contents demonstrates no hematoma or mass.    Patchy ground-glass pulmonary parenchymal opacity in the partially visualized right lung apex.  Paraspinal soft tissues exhibit no acute abnormalities.                              X-Rays:   Independently Interpreted Readings:   Chest X-Ray: Diffuse patchy R lung infiltrate.     Medical Decision Making:   Initial Assessment:       88-year-old male with multiple comorbidities including Parkinson's, AFib (not on anticoagulation due to frequent falls), HTN/DM presents with bilateral leg weakness this morning and unable to ambulate with walker like he does at baseline.  He is in an assisted living facility, and reports he was able to use walker to go to bathroom normally last night, but on trying to this morning slid off the side of his bed and unable to get up since even with assistance.  Per son, he has had similar episodes to this in the past with no clear etiology.  He  has equal leg strength on exam with no sign of CVA.  He denies any head injury or other trauma, but per family he is unreliable historian and may be minimizing.  He denies any preceding CP/SOB/palpitations or infectious symptoms, and has had good appetite.  Exam with no sign head injury and no tenderness to hips, low back or extremities. Given age and comorbidities, will check basic labs and infectious workup for any clear etiology for generalized weakness, and do CT head/C-spine to ensure no trauma since it is unwitnessed fall in unreliable historian.      Basic labs with WBC 17, with no ENZO or electrolyte abnormality.  CT head/C-spine and X-rays of hips, L-spine with no sign of acute injury. No sign UTI. Chest x-ray with patchy consolidation throughout the right lung concerning for pneumonia.  Patient has had no cough or fever but has had slight hypoxia ED with O2 sat on room air 88%, improved to mid 90s on 2 L nasal cannula.  He is hemodynamically stable with paced rhythm on EKG and normal BP, afebrile in the ED.  He does have chronic lower extremity edema that is unchanged now, a slightly elevated BNP, with clinical picture is more consistent with pneumonia than CHF.  No known history of aspiration. Will treat empirically for community-acquired pneumonia since patient has no recent admissions risk factors for healthcare associated pneumonia, and will also send a lactate and blood cultures.  Will admit to hospitalist for observation and IV antibiotics, which patient and family agree with.  Can consider CT chest or further imaging given diffuse infiltrate of right lung and age.      Independently Interpreted Test(s):   I have ordered and independently interpreted X-rays - see prior notes.  I have ordered and independently interpreted EKG Reading(s) - see prior notes  Clinical Tests:   Lab Tests: Ordered and Reviewed  Radiological Study: Ordered and Reviewed  Medical Tests: Ordered and Reviewed            Scribe  Attestation:   Scribe #1: I performed the above scribed service and the documentation accurately describes the services I performed. I attest to the accuracy of the note.    Attending Attestation:           Physician Attestation for Scribe:  Physician Attestation Statement for Scribe #1: I, Dr. Parikh, reviewed documentation, as scribed by Daxa Quiroga in my presence, and it is both accurate and complete.                    Clinical Impression:     1. Parkinson disease    2. Fall    3. Pneumonia of right lung due to infectious organism, unspecified part of lung    4. Pneumonia                                 Neil Parikh MD  11/09/18 1444       Neil Parikh MD  11/09/18 1441

## 2018-11-09 NOTE — ASSESSMENT & PLAN NOTE
-This is mild and without symptoms and actually may be due to poor connectivity between the pulseox and his skin  -Continue treatment of pneumonia and provide supplemental O2  -Albuterol as needed  -Pulse ox q4h.  -He is DNR

## 2018-11-09 NOTE — ASSESSMENT & PLAN NOTE
-Admit to inpatient telemetry status  -Has infiltrate in RUL, leukocytosis and mild hypoxia but is afebrile without cough  -Order blood culture, sputum culture, rapid flu, urine legionella  -Provide tessalon perles, guaifenesin and albuterol nebulizers as needed  -Treat with rocephin and azithromycin for now.

## 2018-11-09 NOTE — HPI
Mr. Parmar is an 88 year old man with a history of parkinsons disease, atrial fibrillation, diabetes, spinal stenosis and frequent falls who was found on the floor by his bed this morning at 6:30AM by the staff at [facility] where he resides.  He states he simply slid out of bed to the floor when getting out of be and was unable to get up off the floor.  He denied any injury.  Unfortunately, he was found on the floor again two other times today.  He has no recollection of how those occurred.  His son is at the bedside and informs me he has a long history of falls, and at one point suffered a small subdural hematoma.  In general Mr. Parmar ambulates with a walker.  His only complaint at this time is that he has a bit of an increase in right hip discomfort and right thorax discomfort.  He denies knowledge of having fallen on these areas today.He denies any fevers, cough, shortness of breath, loss of consciousness, headache, neck stiffness, palpitations or new weakness.      In the ER he was found to have a right upper lobe infiltrate on chest xray, a leukocytosis and mild asymptomatic hypoxia and is being admitted for treatment of community acquired pneumonia.

## 2018-11-10 PROBLEM — R78.81 GRAM-POSITIVE BACTEREMIA: Status: ACTIVE | Noted: 2018-11-10

## 2018-11-10 LAB
ANION GAP SERPL CALC-SCNC: 10 MMOL/L
BASOPHILS # BLD AUTO: 0.01 K/UL
BASOPHILS NFR BLD: 0.1 %
BUN SERPL-MCNC: 17 MG/DL
CALCIUM SERPL-MCNC: 9 MG/DL
CHLORIDE SERPL-SCNC: 106 MMOL/L
CO2 SERPL-SCNC: 24 MMOL/L
CREAT SERPL-MCNC: 1 MG/DL
DIFFERENTIAL METHOD: ABNORMAL
EOSINOPHIL # BLD AUTO: 0.3 K/UL
EOSINOPHIL NFR BLD: 3.2 %
ERYTHROCYTE [DISTWIDTH] IN BLOOD BY AUTOMATED COUNT: 14.4 %
EST. GFR  (AFRICAN AMERICAN): >60 ML/MIN/1.73 M^2
EST. GFR  (NON AFRICAN AMERICAN): >60 ML/MIN/1.73 M^2
ESTIMATED AVG GLUCOSE: 126 MG/DL
FLUAV AG SPEC QL IA: NEGATIVE
FLUBV AG SPEC QL IA: NEGATIVE
GLUCOSE SERPL-MCNC: 110 MG/DL
HBA1C MFR BLD HPLC: 6 %
HCT VFR BLD AUTO: 45.4 %
HGB BLD-MCNC: 14.8 G/DL
LYMPHOCYTES # BLD AUTO: 0.9 K/UL
LYMPHOCYTES NFR BLD: 10.8 %
MAGNESIUM SERPL-MCNC: 2 MG/DL
MCH RBC QN AUTO: 32.1 PG
MCHC RBC AUTO-ENTMCNC: 32.6 G/DL
MCV RBC AUTO: 99 FL
MONOCYTES # BLD AUTO: 0.7 K/UL
MONOCYTES NFR BLD: 8.7 %
NEUTROPHILS # BLD AUTO: 6.4 K/UL
NEUTROPHILS NFR BLD: 76.8 %
PLATELET # BLD AUTO: 99 K/UL
PMV BLD AUTO: 11.9 FL
POCT GLUCOSE: 122 MG/DL (ref 70–110)
POCT GLUCOSE: 127 MG/DL (ref 70–110)
POCT GLUCOSE: 134 MG/DL (ref 70–110)
POCT GLUCOSE: 136 MG/DL (ref 70–110)
POCT GLUCOSE: 99 MG/DL (ref 70–110)
POTASSIUM SERPL-SCNC: 3.7 MMOL/L
PROCALCITONIN SERPL IA-MCNC: 0.14 NG/ML
RBC # BLD AUTO: 4.61 M/UL
SODIUM SERPL-SCNC: 140 MMOL/L
SPECIMEN SOURCE: NORMAL
WBC # BLD AUTO: 8.31 K/UL

## 2018-11-10 PROCEDURE — 87632 RESP VIRUS 6-11 TARGETS: CPT

## 2018-11-10 PROCEDURE — 97161 PT EVAL LOW COMPLEX 20 MIN: CPT

## 2018-11-10 PROCEDURE — 97165 OT EVAL LOW COMPLEX 30 MIN: CPT

## 2018-11-10 PROCEDURE — 85025 COMPLETE CBC W/AUTO DIFF WBC: CPT

## 2018-11-10 PROCEDURE — 25000003 PHARM REV CODE 250: Performed by: HOSPITALIST

## 2018-11-10 PROCEDURE — 11000001 HC ACUTE MED/SURG PRIVATE ROOM

## 2018-11-10 PROCEDURE — 94761 N-INVAS EAR/PLS OXIMETRY MLT: CPT

## 2018-11-10 PROCEDURE — 27000221 HC OXYGEN, UP TO 24 HOURS

## 2018-11-10 PROCEDURE — 36415 COLL VENOUS BLD VENIPUNCTURE: CPT

## 2018-11-10 PROCEDURE — 80048 BASIC METABOLIC PNL TOTAL CA: CPT

## 2018-11-10 PROCEDURE — 97116 GAIT TRAINING THERAPY: CPT

## 2018-11-10 PROCEDURE — 63600175 PHARM REV CODE 636 W HCPCS: Performed by: NURSE PRACTITIONER

## 2018-11-10 PROCEDURE — 83036 HEMOGLOBIN GLYCOSYLATED A1C: CPT

## 2018-11-10 PROCEDURE — 25000242 PHARM REV CODE 250 ALT 637 W/ HCPCS: Performed by: HOSPITALIST

## 2018-11-10 PROCEDURE — 83735 ASSAY OF MAGNESIUM: CPT

## 2018-11-10 PROCEDURE — 99233 SBSQ HOSP IP/OBS HIGH 50: CPT | Mod: ,,, | Performed by: HOSPITALIST

## 2018-11-10 PROCEDURE — 63600175 PHARM REV CODE 636 W HCPCS: Performed by: HOSPITALIST

## 2018-11-10 PROCEDURE — 84145 PROCALCITONIN (PCT): CPT

## 2018-11-10 PROCEDURE — 87400 INFLUENZA A/B EACH AG IA: CPT

## 2018-11-10 RX ORDER — VANCOMYCIN HYDROCHLORIDE
1250
Status: DISCONTINUED | OUTPATIENT
Start: 2018-11-10 | End: 2018-11-11

## 2018-11-10 RX ADMIN — PRAMIPEXOLE DIHYDROCHLORIDE 1 MG: 0.5 TABLET ORAL at 09:11

## 2018-11-10 RX ADMIN — AZITHROMYCIN MONOHYDRATE 500 MG: 500 INJECTION, POWDER, LYOPHILIZED, FOR SOLUTION INTRAVENOUS at 02:11

## 2018-11-10 RX ADMIN — PRAMIPEXOLE DIHYDROCHLORIDE 1 MG: 0.5 TABLET ORAL at 08:11

## 2018-11-10 RX ADMIN — PANTOPRAZOLE SODIUM 40 MG: 40 TABLET, DELAYED RELEASE ORAL at 08:11

## 2018-11-10 RX ADMIN — Medication 1250 MG: at 05:11

## 2018-11-10 RX ADMIN — ASPIRIN 325 MG: 325 TABLET, DELAYED RELEASE ORAL at 08:11

## 2018-11-10 RX ADMIN — CEFTRIAXONE 1 G: 1 INJECTION, SOLUTION INTRAVENOUS at 02:11

## 2018-11-10 RX ADMIN — MULTIPLE VITAMINS W/ MINERALS TAB 1 TABLET: TAB at 08:11

## 2018-11-10 RX ADMIN — CARBIDOPA AND LEVODOPA 2 TABLET: 25; 100 TABLET ORAL at 02:11

## 2018-11-10 RX ADMIN — GUAIFENESIN 600 MG: 600 TABLET, EXTENDED RELEASE ORAL at 08:11

## 2018-11-10 RX ADMIN — GUAIFENESIN 600 MG: 600 TABLET, EXTENDED RELEASE ORAL at 09:11

## 2018-11-10 RX ADMIN — FLUTICASONE PROPIONATE 50 MCG: 50 SPRAY, METERED NASAL at 08:11

## 2018-11-10 RX ADMIN — PRAMIPEXOLE DIHYDROCHLORIDE 1 MG: 0.5 TABLET ORAL at 02:11

## 2018-11-10 RX ADMIN — TAMSULOSIN HYDROCHLORIDE 0.4 MG: 0.4 CAPSULE ORAL at 08:11

## 2018-11-10 RX ADMIN — GABAPENTIN 300 MG: 300 CAPSULE ORAL at 08:11

## 2018-11-10 RX ADMIN — CARBIDOPA AND LEVODOPA 2 TABLET: 25; 100 TABLET ORAL at 08:11

## 2018-11-10 RX ADMIN — Medication 1250 MG: at 06:11

## 2018-11-10 RX ADMIN — FUROSEMIDE 20 MG: 20 TABLET ORAL at 08:11

## 2018-11-10 RX ADMIN — LOSARTAN POTASSIUM 50 MG: 50 TABLET, FILM COATED ORAL at 08:11

## 2018-11-10 RX ADMIN — CARBIDOPA AND LEVODOPA 2 TABLET: 25; 100 TABLET ORAL at 09:11

## 2018-11-10 RX ADMIN — GABAPENTIN 300 MG: 300 CAPSULE ORAL at 09:11

## 2018-11-10 RX ADMIN — DILTIAZEM HYDROCHLORIDE 120 MG: 120 CAPSULE, COATED, EXTENDED RELEASE ORAL at 08:11

## 2018-11-10 NOTE — PLAN OF CARE
Problem: Physical Therapy Goal  Goal: Physical Therapy Goal  Goals to be met by 11-17-18.  1. Sup<>sit mod I  2. Sit<>stand with RW mod I  3. amb 150' with RW mod I    -    Comments: Physical therapy eval completed.  Recommend home with  PT.  Will need 3-in 1 commode for home use.

## 2018-11-10 NOTE — PLAN OF CARE
Problem: Patient Care Overview  Goal: Plan of Care Review  Outcome: Ongoing (interventions implemented as appropriate)  Pt on 2 lpm nasal cannula, SpO2 95%. No respiratory distress noted, prn treatment not required. Respiratory culture pending, pt unable to cough up anything. Sputum cup remains at bedside with patient.

## 2018-11-10 NOTE — PLAN OF CARE
Problem: Patient Care Overview  Goal: Plan of Care Review  Outcome: Ongoing (interventions implemented as appropriate)  Patient in no noted acute distress.safety maintained and call light in reach,hourly rounding done bed in low locked position.V/s stable.will cont to assess

## 2018-11-10 NOTE — PROGRESS NOTES
[facility] Hospital Medicine  Progress Note    Patient Name: Silvano Parmar  MRN: [id number]  Patient Class: IP- Inpatient   Admission Date: 11/9/2018  Length of Stay: 1 days  Attending Physician: Silvano Mac MD  Primary Care Provider: Silvestre Ramirez MD        Subjective:     Principal Problem:Community acquired pneumonia of right upper lobe of lung    HPI:  Mr. Parmar is an 88 year old man with a history of parkinsons disease, atrial fibrillation, diabetes, spinal stenosis and frequent falls who was found on the floor by his bed this morning at 6:30AM by the staff at [place] where he resides.  He states he simply slid out of bed to the floor when getting out of be and was unable to get up off the floor.  He denied any injury.  Unfortunately, he was found on the floor again two other times today.  He has no recollection of how those occurred.  His son is at the bedside and informs me he has a long history of falls, and at one point suffered a small subdural hematoma.  In general Mr. Parmar ambulates with a walker.  His only complaint at this time is that he has a bit of an increase in right hip discomfort and right thorax discomfort.  He denies knowledge of having fallen on these areas today.He denies any fevers, cough, shortness of breath, loss of consciousness, headache, neck stiffness, palpitations or new weakness.      In the ER he was found to have a right upper lobe infiltrate on chest xray, a leukocytosis and mild asymptomatic hypoxia and is being admitted for treatment of community acquired pneumonia.    Hospital Course:  No notes on file    Interval History: No acute events noted overnight.  He looks improved today, more alert with less dysarthria.  He denies sob or cp, but admits to a bit of a dry cough.  Daughter at bedside and all questions answered.    Review of Systems   Constitutional: Negative for activity change and appetite change.   HENT: Negative for congestion  and dental problem.    Eyes: Negative for discharge and itching.   Respiratory: Negative for apnea and chest tightness.    Cardiovascular: Negative for chest pain and leg swelling.   Gastrointestinal: Negative for abdominal distention and abdominal pain.   Endocrine: Negative for cold intolerance and heat intolerance.   Genitourinary: Negative for difficulty urinating and dysuria.   Musculoskeletal: Negative for arthralgias and back pain.   Allergic/Immunologic: Negative for environmental allergies and food allergies.   Neurological: Positive for tremors. Negative for dizziness.   Hematological: Negative for adenopathy. Does not bruise/bleed easily.   Psychiatric/Behavioral: Negative for agitation and behavioral problems.     Objective:     Vital Signs (Most Recent):  Temp: 96.4 °F (35.8 °C) (11/10/18 0722)  Pulse: 68 (11/10/18 0800)  Resp: 18 (11/10/18 0722)  BP: (!) 161/96 (11/10/18 0722)  SpO2: (!) 93 % (11/10/18 0722) Vital Signs (24h Range):  Temp:  [96.4 °F (35.8 °C)-98.3 °F (36.8 °C)] 96.4 °F (35.8 °C)  Pulse:  [68-74] 68  Resp:  [18-28] 18  SpO2:  [86 %-96 %] 93 %  BP: (119-177)/(75-96) 161/96     Weight: 97.5 kg (215 lb)  Body mass index is 29.16 kg/m².    Intake/Output Summary (Last 24 hours) at 11/10/2018 0922  Last data filed at 11/10/2018 0600  Gross per 24 hour   Intake 250 ml   Output 300 ml   Net -50 ml      Physical Exam   Constitutional: He is oriented to person, place, and time. He appears well-developed and well-nourished.   HENT:   Head: Normocephalic and atraumatic.   Eyes: EOM are normal. Pupils are equal, round, and reactive to light.   Neck: Normal range of motion. Neck supple.   Cardiovascular: Normal rate, regular rhythm and normal heart sounds.   Pulmonary/Chest: Effort normal. No respiratory distress.   Mild coarseness in right upper lobe.  No wheezes.  Overall good air movement.   Abdominal: Soft. Bowel sounds are normal. He exhibits no distension. There is no tenderness.    Musculoskeletal: Normal range of motion. He exhibits edema.   Significant chronic edema to both legs.   Neurological: He is oriented to person, place, and time. No cranial nerve deficit. Coordination normal.   Chronic tremor and dysarthria are both improved today.  Moves all extremities with good strength.   Skin: Skin is warm and dry.   Psychiatric: He has a normal mood and affect. His behavior is normal.   Vitals reviewed.      Significant Labs:   A1C: No results for input(s): HGBA1C in the last 4320 hours.  Blood Culture:   Recent Labs   Lab 11/09/18  1419   LABBLOO Gram stain getachew bottle: Gram positive cocci   Results called to and read back by: Inga Faustin RN  11/10/2018    05:12  Gram stain getachew bottle: Gram positive cocci   Results called to and read back by: Inga Faustin RN  11/10/2018    05:11     BMP:   Recent Labs   Lab 11/10/18  0423         K 3.7      CO2 24   BUN 17   CREATININE 1.0   CALCIUM 9.0   MG 2.0     CBC:   Recent Labs   Lab 11/09/18  1156 11/10/18  0423   WBC 17.26* 8.31   HGB 14.2 14.8   HCT 42.8 45.4   * 99*     CMP:   Recent Labs   Lab 11/09/18  1156 11/10/18  0423    140   K 3.5 3.7    106   CO2 26 24   * 110   BUN 18 17   CREATININE 1.2 1.0   CALCIUM 8.9 9.0   PROT 6.6  --    ALBUMIN 3.3*  --    BILITOT 1.0  --    ALKPHOS 99  --    AST 34  --    ALT <5*  --    ANIONGAP 8 10   EGFRNONAA 54* >60     Coagulation: No results for input(s): PT, INR, APTT in the last 48 hours.  Lactic Acid:   Recent Labs   Lab 11/09/18  1419   LACTATE 1.5     Magnesium:   Recent Labs   Lab 11/09/18  1156 11/10/18  0423   MG 1.5* 2.0     Respiratory Culture: No results for input(s): GSRESP, RESPIRATORYC in the last 48 hours.  Urine Culture: No results for input(s): LABURIN in the last 48 hours.  Urine Studies:   Recent Labs   Lab 11/09/18  1340   COLORU Yellow   APPEARANCEUA Clear   PHUR 7.0   SPECGRAV <=1.005*   PROTEINUA Negative   GLUCUA Negative    KETONESU Negative   BILIRUBINUA Negative   OCCULTUA Negative   NITRITE Negative   UROBILINOGEN 1.0   LEUKOCYTESUR Negative       Significant Imaging: I have reviewed and interpreted all pertinent imaging results/findings within the past 24 hours.    Assessment/Plan:      * Community acquired pneumonia of right upper lobe of lung    -Admitted to inpatient telemetry status  -On admit had infiltrate in RUL, leukocytosis and mild hypoxia but was afebrile without cough  -Remains afebrile and wbc has normalized today.  -Flu is negative.  Gram stain from blood cultures shows GPC.  -Await blood culture, sputum culture and urine legionella  -Provide tessalon perles, guaifenesin and albuterol nebulizers as needed  -Treat with rocephin and azithromycin for now but if any fever or decompensation will add vancomycin.     Possible Gram-positive bacteremia    -Gram stains noted to have GPC  -Await speciation  -Patient is afebrile and improving clinically  -If any fever will escalate to vancomycin.       Hypomagnesemia    -Mag normal today  -Repeat labs in AM.         Acute respiratory failure with hypoxia    -This is mild and without symptoms and actually may be due to poor connectivity between the pulseox and his skin  -Presently satting in the mid 90s on room air with no discomfort  -Continue treatment of pneumonia   -Continue albuterol as needed  -Pulse ox q4h.  -He is DNR        Chronic idiopathic thrombocytopenia    -Platelets mildly low since at least 2015 and have dropped today.  -Will d/c lovenox today  -Monitor closely while in house       Spinal stenosis    -History noted       GERD (gastroesophageal reflux disease)    -Noted  -Continue home PPI       Benign prostatic hyperplasia    -History noted  -Continue flomax       HTN (hypertension)    -Continue home diltiazem  -Provide hydralazine if needed for SBP >180       DM (diabetes mellitus), type 2    -A1c is 6.0  -Hold sitagliptin for now  -Provide diabetic  diet  -Treat with SSI ac/hs for now.       Hyperlipemia    -History noted  -Not on treatment at this time       Parkinson disease    -Noted and appears stable  -Will continue home pramipexol       A-fib    -History noted.  Previously was on anticoagulation but due to many falls and at least one subdural hematoma anticoagulation was discontinued and felt unsafe for him  -Monitor on tele  -Continue home diltiazem         VTE Risk Mitigation (From admission, onward)        Ordered     Place sequential compression device  Until discontinued      11/09/18 1727     IP VTE HIGH RISK PATIENT  Once      11/09/18 1727     Place YISEL hose  Until discontinued      11/09/18 1727              Silvano Mac MD  Department of Hospital Medicine   [facility]

## 2018-11-10 NOTE — PT/OT/SLP EVAL
Physical Therapy Evaluation and Treatment    Patient Name:  Silvano Parmar   MRN:  [id number]    Recommendations:     Discharge Recommendations:  home, home with home health, home health PT   Discharge Equipment Recommendations: 3-in-1 commode   Barriers to discharge: Decreased caregiver support    Assessment:     Silvano Parmar is a 88 y.o. male admitted with a medical diagnosis of Community acquired pneumonia of right upper lobe of lung.  He presents with the following impairments/functional limitations:  weakness, gait instability, impaired functional mobilty, impaired balance, decreased coordination, pain, decreased safety awareness, impaired cognition .  Pt with Hx of Parkinson's and recent Hx of multiple falls.  Will benefit from PT to decrease fall risk and improve safety.     Rehab Prognosis:  excellent; patient would benefit from acute skilled PT services to address these deficits and reach maximum level of function.      Recent Surgery: * No surgery found *      Plan:     During this hospitalization, patient to be seen 6 x/week to address the above listed problems via gait training, therapeutic activities, therapeutic exercises  · Plan of Care Expires:  12/10/18   Plan of Care Reviewed with: patient, daughter    Subjective     Communicated with patient prior to session.  Patient found in bed supine upon PT entry to room, agreeable to evaluation.      Chief Complaint: no c/o  Patient comments/goals: to go home  Pain/Comfort:  · Pain Rating 1: 3/10  · Location - Side 1: Bilateral  · Location 1: hip  · Pain Addressed 1: Distraction  · Pain Rating Post-Intervention 1: 3/10    Patients cultural, spiritual, Jain conflicts given the current situation: none mentioned    Living Environment:  Pt lives in [facility] assisted living with elevator access. Shower in stall with bench.  Prior to admission, patients level of function was mod I with RW.  Patient has the following equipment: walker,  rolling, rollator, cane, straight.  DME owned (not currently used): none.  Upon discharge, patient will have assistance from daughter and staff at [facility].    Objective:     Patient found with: peripheral IV, Condom Catheter, oxygen(O2 2L/min)     General Precautions: Standard, fall   Orthopedic Precautions:N/A   Braces: N/A     Exams:  · Cognitive Exam:  Patient is oriented to Person and Place  · Fine Motor Coordination:    · -       Impaired  RLE heel shin - and LLE heel shin -  · Gross Motor Coordination:  WFL  · Postural Exam:  Patient presented with the following abnormalities:    · -       Forward head  · Sensation:    · -       Intact  · Skin Integrity/Edema:      · -       Skin integrity: Visible skin intact  · -       Edema: Mild B LE ankles L>R  · RLE ROM: WFL  · RLE Strength: WFL  · LLE ROM: WFL  · LLE Strength: WFL    Functional Mobility:  · Bed Mobility:     · Supine to Sit: minimum assistance  · Transfers:     · Sit to Stand:  contact guard assistance with rolling walker  · Gait: amb 30' with RW and CGA  · Balance: fair standing dynamic balance    AM-PAC 6 CLICK MOBILITY  Total Score:18       Patient left up in chair with all lines intact, call button in reach, nurse notified, daughters present and [facility] SYS.    GOALS:   Multidisciplinary Problems     Physical Therapy Goals        Problem: Physical Therapy Goal    Goal Priority Disciplines Outcome Goal Variances Interventions   Physical Therapy Goal     PT, PT/OT      Description:  Goals to be met by 11-17-18.  1. Sup<>sit mod I  2. Sit<>stand with RW mod I  3. amb 150' with RW mod I                      History:     Past Medical History:   Diagnosis Date    *Atrial fibrillation     AI (aortic insufficiency)     mild    Atrial fibrillation     Diabetes mellitus     Diabetes mellitus, type 2     Hyperlipidemia     Hypertension     Memory loss     Parkinson disease     Spinal stenosis memory loss       Past Surgical History:   Procedure  Laterality Date    CHOLECYSTECTOMY         Clinical Decision Making:     History  Co-morbidities and personal factors that may impact the plan of care Examination  Body Structures and Functions, activity limitations and participation restrictions that may impact the plan of care Clinical Presentation   Decision Making/ Complexity Score   Co-morbidities:   [] Time since onset of injury / illness / exacerbation  [] Status of current condition  []Patient's cognitive status and safety concerns    [] Multiple Medical Problems (see med hx)  Personal Factors:   [] Patient's age  [] Prior Level of function   [] Patient's home situation (environment and family support)  [] Patient's level of motivation  [] Expected progression of patient      HISTORY:(criteria)    [] 20434 - no personal factors/history    [] 35522 - has 1-2 personal factor/comorbidity     [] 36915 - has >3 personal factor/comorbidity     Body Regions:  [] Objective examination findings  [] Head     []  Neck  [] Trunk   [] Upper Extremity  [] Lower Extremity    Body Systems:  [] For communication ability, affect, cognition, language, and learning style: the assessment of the ability to make needs known, consciousness, orientation (person, place, and time), expected emotional /behavioral responses, and learning preferences (eg, learning barriers, education  needs)  [] For the neuromuscular system: a general assessment of gross coordinated movement (eg, balance, gait, locomotion, transfers, and transitions) and motor function  (motor control and motor learning)  [] For the musculoskeletal system: the assessment of gross symmetry, gross range of motion, gross strength, height, and weight  [] For the integumentary system: the assessment of pliability(texture), presence of scar formation, skin color, and skin integrity  [] For cardiovascular/pulmonary system: the assessment of heart rate, respiratory rate, blood pressure, and edema     Activity limitations:    []  Patient's cognitive status and saf ety concerns          [] Status of current condition      [] Weight bearing restriction  [] Cardiopulmunary Restriction    Participation Restrictions:   [] Goals and goal agreement with the patient     [] Rehab potential (prognosis) and probable outcome      Examination of Body System: (criteria)    [] 97334 - addressing 1-2 elements    [] 02199 - addressing a total of 3 or more elements     [] 65765 -  Addressing a total of 4 or more elements         Clinical Presentation: (criteria)  Choose one     On examination of body system using standardized tests and measures patient presents with (CHOOSE ONE) elements from any of the following: body structures and functions, activity limitations, and/or participation restrictions.  Leading to a clinical presentation that is considered (CHOOSE ONE)                              Clinical Decision Making  (Eval Complexity):  Choose One     Time Tracking:     PT Received On: 11/10/18  PT Start Time: 1000     PT Stop Time: 1048  PT Total Time (min): 48 min     Billable Minutes: Evaluation 33 and Therapeutic Activity 15      Silvano Mejia, PT  11/10/2018

## 2018-11-10 NOTE — ASSESSMENT & PLAN NOTE
-Platelets mildly low since at least 2015 and have dropped today.  -Will d/c lovenox today  -Monitor closely while in house

## 2018-11-10 NOTE — PT/OT/SLP EVAL
Occupational Therapy   Evaluation    Name: Silvano Parmar  MRN: [id number]  Admitting Diagnosis:  Community acquired pneumonia of right upper lobe of lung      Recommendations:     Discharge Recommendations: home health PT, home health OT  Discharge Equipment Recommendations:  3-in-1 commode  Barriers to discharge:  Decreased caregiver support    History:     Occupational Profile:  Living Environment: lives alone in an assisted living apartment at [facility] with elevator access to apartment and no [place] at entry  Previous level of function: ambulated with RW (frequent falls), assist with self-care and household tasks  Roles and Routines: none reported  Equipment Used at Home:  walker, rolling, rollator, cane, straight(shower with built-in seat)  Assistance upon Discharge: he has the assist of his daughter and facility staff at discharge    Past Medical History:   Diagnosis Date    *Atrial fibrillation     AI (aortic insufficiency)     mild    Atrial fibrillation     Diabetes mellitus     Diabetes mellitus, type 2     Hyperlipidemia     Hypertension     Memory loss     Parkinson disease     Spinal stenosis memory loss       Past Surgical History:   Procedure Laterality Date    CHOLECYSTECTOMY         Subjective     Chief Complaint: none  Patient/Family Comments/goals: go home    Pain/Comfort:  · Pain Rating 1: 0/10  · Pain Rating Post-Intervention 1: 0/10    Patients cultural, spiritual, [employer]ism conflicts given the current situation: none    Objective:     Communicated with: patient and his nurse prior to session.  Patient found call button in reach, bed alarm on and nurse notified and peripheral IV, Condom Catheter, telemetry upon OT entry to room.  The pt was agreeable to the OT evaluation.  In response to his nurses concern about OT seeing the patient because his condom catheter came out during his PT session; a PCT assisted with management of the condom catheter during the evaluation.    General  Precautions: Standard,     Orthopedic Precautions:N/A   Braces: N/A     Occupational Performance:    Bed Mobility:    · Max A roll to Right  · Mod A roll to Left  · Mod A supine>sit EOB  · Min A sit>supine    Functional Mobility/Transfers:       CGA sit><stand (Mod A to scoot EOB to get feet on floor) with RW    He took 4 steps EOB with RW (difficulty initiating, small steps, feel barely cleared the floor)    Activities of Daily Living:  · Min A G/H (mouth wash, wash face and hands)-assist for initiation, sequencing and task memory  · Mod A UBD (don/[facility] gown) bed level  · Total A LBD (don socks) bed level    Cognitive/Visual Perceptual:  Cognitive/Psychosocial Skills:     -       Oriented to: Person and general place   -       Follows Commands/attention:Follows one-step commands and needs repetition with VC paired with manual demonstration 30%  -       Communication: clear/fluent and answers unreliable (variable answers to same questions)  -       Memory: Impaired STM, Impaired LTM and Poor immediate recall  -       Safety awareness/insight to disability: impaired   -       Mood/Affect/Coping skills/emotional control: Appropriate to situation  Visual/Perceptual:      -Intact dentures    Physical Exam:  Postural examination/scapula alignment:    -       rigid trunk and legs  Skin integrity: Visible skin intact  Edema:  None noted  Sensation:    -       Intact for light touch both hands  Motor Planning:    -       poor  Dominant hand:    -       Right  UE ROM: shoulder flexion-ABD 50* BUE  UE strength 4/5 BUE  Endurance: Poor  Hand Function: fair both hands  Gross motor coordination:   fair static sitting balance EOB, able to stand with RW CGA with difficulty stepping    AM PAC 6 Click ADL:  AM PAC Total Score: 11    Patient left supine with all lines intact, call button in reach, bed alarm on and nurse notified, PCT present    Assessment:     Silvano Parmar is a 88 y.o. male with a medical diagnosis of  "Community acquired pneumonia of right upper lobe of lung.  He presents with the following performance deficits affecting function: impaired endurance, gait instability, impaired functional mobilty, decreased safety awareness, impaired self care skills, decreased lower extremity function, impaired cognition, decreased ROM.  OT treatment is needed to maximize patient function while in the hospital.    Rehab Prognosis: Fair; patient would benefit from acute skilled OT services to address these deficits and reach maximum level of function.         Clinical Decision Makin.  OT Low:  "Pt evaluation falls under low complexity for evaluation coding due to performance deficits noted in 1-3 areas as stated above and 0 co-morbities affecting current functional status. Data obtained from problem focused assessments. No modifications or assistance was required for completion of evaluation. Only brief occupational profile and history review completed."     Plan:     Patient to be seen 5 x/week to address the above listed problems via self-care/home management, therapeutic activities, therapeutic exercises  · Plan of Care Expires:    · Plan of Care Reviewed with: patient    This Plan of care has been discussed with the patient who was involved in its development and understands and is in agreement with the identified goals and treatment plan    GOALS:   Multidisciplinary Problems     Occupational Therapy Goals        Problem: Occupational Therapy Goal    Goal Priority Disciplines Outcome Interventions   Occupational Therapy Goal     OT, PT/OT Ongoing (interventions implemented as appropriate)    Description:  Goals to be met by 12/10/18  1. Assess self-feeding  2. SBA G/H seated EOB  3. Mod A UBD seated EOB or chair  4. Assess bed-chair transfer                    Time Tracking:     OT Date of Treatment: 11/10/18  OT Start Time: 1500  OT Stop Time: 1533  OT Total Time (min): 33 min    Billable Minutes:Evaluation " 33    NELLY Carias  11/10/2018

## 2018-11-10 NOTE — ASSESSMENT & PLAN NOTE
-Admitted to inpatient telemetry status  -On admit had infiltrate in RUL, leukocytosis and mild hypoxia but was afebrile without cough  -Remains afebrile and wbc has normalized today.  -Flu is negative.  Gram stain from blood cultures shows GPC.  -Await blood culture, sputum culture and urine legionella  -Provide tessalon perles, guaifenesin and albuterol nebulizers as needed  -Treat with rocephin and azithromycin for now but if any fever or decompensation will add vancomycin.

## 2018-11-10 NOTE — ASSESSMENT & PLAN NOTE
-Gram stains noted to have GPC  -Await speciation  -Patient is afebrile and improving clinically  -If any fever will escalate to vancomycin.

## 2018-11-10 NOTE — SUBJECTIVE & OBJECTIVE
Interval History: No acute events noted overnight.  He looks improved today, more alert with less dysarthria.  He denies sob or cp, but admits to a bit of a dry cough.  Daughter at bedside and all questions answered.    Review of Systems   Constitutional: Negative for activity change and appetite change.   HENT: Negative for congestion and dental problem.    Eyes: Negative for discharge and itching.   Respiratory: Negative for apnea and chest tightness.    Cardiovascular: Negative for chest pain and leg swelling.   Gastrointestinal: Negative for abdominal distention and abdominal pain.   Endocrine: Negative for cold intolerance and heat intolerance.   Genitourinary: Negative for difficulty urinating and dysuria.   Musculoskeletal: Negative for arthralgias and back pain.   Allergic/Immunologic: Negative for environmental allergies and food allergies.   Neurological: Positive for tremors. Negative for dizziness.   Hematological: Negative for adenopathy. Does not bruise/bleed easily.   Psychiatric/Behavioral: Negative for agitation and behavioral problems.     Objective:     Vital Signs (Most Recent):  Temp: 96.4 °F (35.8 °C) (11/10/18 0722)  Pulse: 68 (11/10/18 0800)  Resp: 18 (11/10/18 0722)  BP: (!) 161/96 (11/10/18 0722)  SpO2: (!) 93 % (11/10/18 0722) Vital Signs (24h Range):  Temp:  [96.4 °F (35.8 °C)-98.3 °F (36.8 °C)] 96.4 °F (35.8 °C)  Pulse:  [68-74] 68  Resp:  [18-28] 18  SpO2:  [86 %-96 %] 93 %  BP: (119-177)/(75-96) 161/96     Weight: 97.5 kg (215 lb)  Body mass index is 29.16 kg/m².    Intake/Output Summary (Last 24 hours) at 11/10/2018 0922  Last data filed at 11/10/2018 0600  Gross per 24 hour   Intake 250 ml   Output 300 ml   Net -50 ml      Physical Exam   Constitutional: He is oriented to person, place, and time. He appears well-developed and well-nourished.   HENT:   Head: Normocephalic and atraumatic.   Eyes: EOM are normal. Pupils are equal, round, and reactive to light.   Neck: Normal range of  motion. Neck supple.   Cardiovascular: Normal rate, regular rhythm and normal heart sounds.   Pulmonary/Chest: Effort normal. No respiratory distress.   Mild coarseness in right upper lobe.  No wheezes.  Overall good air movement.   Abdominal: Soft. Bowel sounds are normal. He exhibits no distension. There is no tenderness.   Musculoskeletal: Normal range of motion. He exhibits edema.   Significant chronic edema to both legs.   Neurological: He is oriented to person, place, and time. No cranial nerve deficit. Coordination normal.   Chronic tremor and dysarthria are both improved today.  Moves all extremities with good strength.   Skin: Skin is warm and dry.   Psychiatric: He has a normal mood and affect. His behavior is normal.   Vitals reviewed.      Significant Labs:   A1C: No results for input(s): HGBA1C in the last 4320 hours.  Blood Culture:   Recent Labs   Lab 11/09/18  1419   LABBLOO Gram stain getachew bottle: Gram positive cocci   Results called to and read back by: Inga Faustin RN  11/10/2018    05:12  Gram stain getachew bottle: Gram positive cocci   Results called to and read back by: Inga Faustin RN  11/10/2018    05:11     BMP:   Recent Labs   Lab 11/10/18  0423         K 3.7      CO2 24   BUN 17   CREATININE 1.0   CALCIUM 9.0   MG 2.0     CBC:   Recent Labs   Lab 11/09/18  1156 11/10/18  0423   WBC 17.26* 8.31   HGB 14.2 14.8   HCT 42.8 45.4   * 99*     CMP:   Recent Labs   Lab 11/09/18  1156 11/10/18  0423    140   K 3.5 3.7    106   CO2 26 24   * 110   BUN 18 17   CREATININE 1.2 1.0   CALCIUM 8.9 9.0   PROT 6.6  --    ALBUMIN 3.3*  --    BILITOT 1.0  --    ALKPHOS 99  --    AST 34  --    ALT <5*  --    ANIONGAP 8 10   EGFRNONAA 54* >60     Coagulation: No results for input(s): PT, INR, APTT in the last 48 hours.  Lactic Acid:   Recent Labs   Lab 11/09/18  1419   LACTATE 1.5     Magnesium:   Recent Labs   Lab 11/09/18  1156 11/10/18  0423   MG 1.5* 2.0      Respiratory Culture: No results for input(s): GSRESP, RESPIRATORYC in the last 48 hours.  Urine Culture: No results for input(s): LABURIN in the last 48 hours.  Urine Studies:   Recent Labs   Lab 11/09/18  1340   COLORU Yellow   APPEARANCEUA Clear   PHUR 7.0   SPECGRAV <=1.005*   PROTEINUA Negative   GLUCUA Negative   KETONESU Negative   BILIRUBINUA Negative   OCCULTUA Negative   NITRITE Negative   UROBILINOGEN 1.0   LEUKOCYTESUR Negative       Significant Imaging: I have reviewed and interpreted all pertinent imaging results/findings within the past 24 hours.

## 2018-11-10 NOTE — PLAN OF CARE
Problem: Patient Care Overview  Goal: Plan of Care Review  Outcome: Ongoing (interventions implemented as appropriate)  Pt remains on 2LNC. Pt appeared frustrated when asked to obtain sample again. Pt says he has no secretions to cough up. Pt remains stable.

## 2018-11-10 NOTE — PLAN OF CARE
Problem: Occupational Therapy Goal  Goal: Occupational Therapy Goal  Goals to be met by 12/10/18  1. Assess self-feeding  2. SBA G/H seated EOB  3. Mod A UBD seated EOB or chair  4. Assess bed-chair transfer  Outcome: Ongoing (interventions implemented as appropriate)  OT evaluation completed with treatment to follow.  Recommend discharge home with 24 hour care and assist plus Home Health PT/OT at discharge.  DME: 3-in-1 [employer].  NELLY Carias 11/10/2018

## 2018-11-10 NOTE — PLAN OF CARE
Problem: Patient Care Overview  Goal: Plan of Care Review  Outcome: Ongoing (interventions implemented as appropriate)  Patient resting in bed at this time, all items are within reach, call light in reach, instructed to call as needed, no injuries reported, bed in lowest and locked position, night light on, rounding and plan of care discussed     2L NC, tolerating   Saline locked  Turn q 2  Condom cath in place  avasys at bedside  Tele on paced with PVCs   Family at bedside  No pain reported at this time       Blood culture x2 positive in anaerobic bottles= gram + cocci   Moonlina Summers NP notified, IVPB vanc added to MAR            No further concerns at this time  Will cont to monitor

## 2018-11-10 NOTE — ASSESSMENT & PLAN NOTE
-This is mild and without symptoms and actually may be due to poor connectivity between the pulseox and his skin  -Presently satting in the mid 90s on room air with no discomfort  -Continue treatment of pneumonia   -Continue albuterol as needed  -Pulse ox q4h.  -He is DNR

## 2018-11-11 PROBLEM — B95.5 STREPTOCOCCAL BACTEREMIA: Status: ACTIVE | Noted: 2018-11-11

## 2018-11-11 PROBLEM — R78.81 STREPTOCOCCAL BACTEREMIA: Status: ACTIVE | Noted: 2018-11-11

## 2018-11-11 PROBLEM — R78.81 GRAM-POSITIVE BACTEREMIA: Status: RESOLVED | Noted: 2018-11-10 | Resolved: 2018-11-11

## 2018-11-11 LAB
ANION GAP SERPL CALC-SCNC: 7 MMOL/L
BASOPHILS # BLD AUTO: 0.01 K/UL
BASOPHILS NFR BLD: 0.1 %
BUN SERPL-MCNC: 18 MG/DL
CALCIUM SERPL-MCNC: 9.3 MG/DL
CHLORIDE SERPL-SCNC: 104 MMOL/L
CO2 SERPL-SCNC: 28 MMOL/L
CREAT SERPL-MCNC: 1 MG/DL
DIFFERENTIAL METHOD: ABNORMAL
EOSINOPHIL # BLD AUTO: 0.3 K/UL
EOSINOPHIL NFR BLD: 4.1 %
ERYTHROCYTE [DISTWIDTH] IN BLOOD BY AUTOMATED COUNT: 14.2 %
EST. GFR  (AFRICAN AMERICAN): >60 ML/MIN/1.73 M^2
EST. GFR  (NON AFRICAN AMERICAN): >60 ML/MIN/1.73 M^2
GLUCOSE SERPL-MCNC: 125 MG/DL
HCT VFR BLD AUTO: 43.8 %
HGB BLD-MCNC: 14.3 G/DL
LYMPHOCYTES # BLD AUTO: 0.8 K/UL
LYMPHOCYTES NFR BLD: 10.2 %
MAGNESIUM SERPL-MCNC: 1.7 MG/DL
MCH RBC QN AUTO: 31.9 PG
MCHC RBC AUTO-ENTMCNC: 32.6 G/DL
MCV RBC AUTO: 98 FL
MONOCYTES # BLD AUTO: 0.8 K/UL
MONOCYTES NFR BLD: 9.6 %
NEUTROPHILS # BLD AUTO: 6.1 K/UL
NEUTROPHILS NFR BLD: 75.8 %
PLATELET # BLD AUTO: 119 K/UL
PMV BLD AUTO: 11.9 FL
POCT GLUCOSE: 126 MG/DL (ref 70–110)
POCT GLUCOSE: 138 MG/DL (ref 70–110)
POCT GLUCOSE: 153 MG/DL (ref 70–110)
POCT GLUCOSE: 166 MG/DL (ref 70–110)
POTASSIUM SERPL-SCNC: 3.4 MMOL/L
RBC # BLD AUTO: 4.48 M/UL
SODIUM SERPL-SCNC: 139 MMOL/L
WBC # BLD AUTO: 8.02 K/UL

## 2018-11-11 PROCEDURE — 99900035 HC TECH TIME PER 15 MIN (STAT)

## 2018-11-11 PROCEDURE — 85025 COMPLETE CBC W/AUTO DIFF WBC: CPT

## 2018-11-11 PROCEDURE — 36415 COLL VENOUS BLD VENIPUNCTURE: CPT

## 2018-11-11 PROCEDURE — 25000003 PHARM REV CODE 250: Performed by: HOSPITALIST

## 2018-11-11 PROCEDURE — 99233 SBSQ HOSP IP/OBS HIGH 50: CPT | Mod: ,,, | Performed by: HOSPITALIST

## 2018-11-11 PROCEDURE — 83735 ASSAY OF MAGNESIUM: CPT

## 2018-11-11 PROCEDURE — 94761 N-INVAS EAR/PLS OXIMETRY MLT: CPT

## 2018-11-11 PROCEDURE — 87040 BLOOD CULTURE FOR BACTERIA: CPT

## 2018-11-11 PROCEDURE — 80048 BASIC METABOLIC PNL TOTAL CA: CPT

## 2018-11-11 PROCEDURE — 11000001 HC ACUTE MED/SURG PRIVATE ROOM

## 2018-11-11 PROCEDURE — 63600175 PHARM REV CODE 636 W HCPCS: Performed by: NURSE PRACTITIONER

## 2018-11-11 RX ORDER — POTASSIUM CHLORIDE 750 MG/1
30 TABLET, EXTENDED RELEASE ORAL ONCE
Status: COMPLETED | OUTPATIENT
Start: 2018-11-11 | End: 2018-11-11

## 2018-11-11 RX ADMIN — DILTIAZEM HYDROCHLORIDE 120 MG: 120 CAPSULE, COATED, EXTENDED RELEASE ORAL at 10:11

## 2018-11-11 RX ADMIN — ASPIRIN 325 MG: 325 TABLET, DELAYED RELEASE ORAL at 10:11

## 2018-11-11 RX ADMIN — Medication 1250 MG: at 06:11

## 2018-11-11 RX ADMIN — PRAMIPEXOLE DIHYDROCHLORIDE 1 MG: 0.5 TABLET ORAL at 09:11

## 2018-11-11 RX ADMIN — PANTOPRAZOLE SODIUM 40 MG: 40 TABLET, DELAYED RELEASE ORAL at 10:11

## 2018-11-11 RX ADMIN — FUROSEMIDE 20 MG: 20 TABLET ORAL at 10:11

## 2018-11-11 RX ADMIN — GABAPENTIN 300 MG: 300 CAPSULE ORAL at 09:11

## 2018-11-11 RX ADMIN — GABAPENTIN 300 MG: 300 CAPSULE ORAL at 10:11

## 2018-11-11 RX ADMIN — CARBIDOPA AND LEVODOPA 2 TABLET: 25; 100 TABLET ORAL at 09:11

## 2018-11-11 RX ADMIN — CARBIDOPA AND LEVODOPA 2 TABLET: 25; 100 TABLET ORAL at 10:11

## 2018-11-11 RX ADMIN — POTASSIUM CHLORIDE 30 MEQ: 750 TABLET, EXTENDED RELEASE ORAL at 11:11

## 2018-11-11 RX ADMIN — FLUTICASONE PROPIONATE 50 MCG: 50 SPRAY, METERED NASAL at 10:11

## 2018-11-11 RX ADMIN — GUAIFENESIN 600 MG: 600 TABLET, EXTENDED RELEASE ORAL at 09:11

## 2018-11-11 RX ADMIN — LOSARTAN POTASSIUM 50 MG: 50 TABLET, FILM COATED ORAL at 10:11

## 2018-11-11 RX ADMIN — CARBIDOPA AND LEVODOPA 2 TABLET: 25; 100 TABLET ORAL at 04:11

## 2018-11-11 RX ADMIN — TAMSULOSIN HYDROCHLORIDE 0.4 MG: 0.4 CAPSULE ORAL at 10:11

## 2018-11-11 RX ADMIN — PRAMIPEXOLE DIHYDROCHLORIDE 1 MG: 0.5 TABLET ORAL at 04:11

## 2018-11-11 RX ADMIN — GUAIFENESIN 600 MG: 600 TABLET, EXTENDED RELEASE ORAL at 10:11

## 2018-11-11 RX ADMIN — MULTIPLE VITAMINS W/ MINERALS TAB 1 TABLET: TAB at 10:11

## 2018-11-11 RX ADMIN — PRAMIPEXOLE DIHYDROCHLORIDE 1 MG: 0.5 TABLET ORAL at 10:11

## 2018-11-11 NOTE — PROGRESS NOTES
Unable to obtain IV access.  Dr. Mac ordered to place orders for PICC line to be placed in AM.  Will continue to monitor.

## 2018-11-11 NOTE — PROGRESS NOTES
[facility] Hospital Medicine  Progress Note    Patient Name: Silvano Parmar  MRN: [id number]  Patient Class: IP- Inpatient   Admission Date: 11/9/2018  Length of Stay: 2 days  Attending Physician: Silvano Mac MD  Primary Care Provider: Silvestre Ramirez MD        Subjective:     Principal Problem:Community acquired pneumonia of right upper lobe of lung    HPI:  Mr. Parmar is an 88 year old man with a history of parkinsons disease, atrial fibrillation, diabetes, spinal stenosis and frequent falls who was found on the floor by his bed this morning at 6:30AM by the staff at [facility] where he resides.  He states he simply slid out of bed to the floor when getting out of be and was unable to get up off the floor.  He denied any injury.  Unfortunately, he was found on the floor again two other times today.  He has no recollection of how those occurred.  His son is at the bedside and informs me he has a long history of falls, and at one point suffered a small subdural hematoma.  In general Mr. Parmar ambulates with a walker.  His only complaint at this time is that he has a bit of an increase in right hip discomfort and right thorax discomfort.  He denies knowledge of having fallen on these areas today.He denies any fevers, cough, shortness of breath, loss of consciousness, headache, neck stiffness, palpitations or new weakness.      In the ER he was found to have a right upper lobe infiltrate on chest xray, a leukocytosis and mild asymptomatic hypoxia and is being admitted for treatment of community acquired pneumonia.    Hospital Course:  No notes on file    Interval History: No acute events noted overnight.  He continues to look good and states he feels like his normal self.  Denies cp, sob, n/v/d.  Denies cough today.  Son is at bedside and all questions answered.    Review of Systems   Constitutional: Negative for activity change and appetite change.   HENT: Negative for congestion and  dental problem.    Eyes: Negative for discharge and itching.   Respiratory: Negative for apnea and chest tightness.    Cardiovascular: Negative for chest pain and leg swelling.   Gastrointestinal: Negative for abdominal distention and abdominal pain.   Endocrine: Negative for cold intolerance and heat intolerance.   Genitourinary: Negative for difficulty urinating and dysuria.   Musculoskeletal: Negative for arthralgias and back pain.   Allergic/Immunologic: Negative for environmental allergies and food allergies.   Neurological: Positive for tremors. Negative for dizziness.   Hematological: Negative for adenopathy. Does not bruise/bleed easily.   Psychiatric/Behavioral: Negative for agitation and behavioral problems.     Objective:     Vital Signs (Most Recent):  Temp: 97.2 °F (36.2 °C) (11/11/18 0733)  Pulse: 94 (11/11/18 1000)  Resp: (!) 24 (11/11/18 0733)  BP: (!) 157/95 (11/11/18 0733)  SpO2: (!) 94 % (11/11/18 0733) Vital Signs (24h Range):  Temp:  [96.2 °F (35.7 °C)-97.5 °F (36.4 °C)] 97.2 °F (36.2 °C)  Pulse:  [69-94] 94  Resp:  [18-24] 24  SpO2:  [93 %-99 %] 94 %  BP: (130-164)/(75-95) 157/95     Weight: 97.5 kg (215 lb)  Body mass index is 29.16 kg/m².    Intake/Output Summary (Last 24 hours) at 11/11/2018 1059  Last data filed at 11/11/2018 1000  Gross per 24 hour   Intake 850 ml   Output 3250 ml   Net -2400 ml      Physical Exam   Constitutional: He is oriented to person, place, and time. He appears well-developed and well-nourished.   HENT:   Head: Normocephalic and atraumatic.   Eyes: EOM are normal. Pupils are equal, round, and reactive to light.   Neck: Normal range of motion. Neck supple.   Cardiovascular: Normal rate, regular rhythm and normal heart sounds.   Pulmonary/Chest: Effort normal. No respiratory distress.   Mild coarseness in right upper lobe.  No wheezes.  Overall good air movement.   Abdominal: Soft. Bowel sounds are normal. He exhibits no distension. There is no tenderness.    Musculoskeletal: Normal range of motion. He exhibits edema.   Significant chronic edema to both legs.   Neurological: He is oriented to person, place, and time. No cranial nerve deficit. Coordination normal.   Chronic tremor and dysarthria are both improved today.  Moves all extremities with good strength.   Skin: Skin is warm and dry.   Psychiatric: He has a normal mood and affect. His behavior is normal.   Vitals reviewed.      Significant Labs:   A1C:   Recent Labs   Lab 11/10/18  0016   HGBA1C 6.0*     Blood Culture:   Recent Labs   Lab 11/09/18  1419   LABBLOO Gram stain getachew bottle: Gram positive cocci   Results called to and read back by: Inga Faustin RN  11/10/2018    05:12  STREPTOCOCCUS LUTETIENSIS  Gram stain getachew bottle: Gram positive cocci   Results called to and read back by: Inga Faustin RN  11/10/2018    05:11  Gram stain aer bottle: budding yeast   Results called to and read back by: Linette Brito RN 11/10/2018  21:21  STREPTOCOCCUS LUTETIENSIS     BMP:   Recent Labs   Lab 11/11/18  0504   *      K 3.4*      CO2 28   BUN 18   CREATININE 1.0   CALCIUM 9.3   MG 1.7     CBC:   Recent Labs   Lab 11/09/18  1156 11/10/18  0423 11/11/18  0504   WBC 17.26* 8.31 8.02   HGB 14.2 14.8 14.3   HCT 42.8 45.4 43.8   * 99* 119*     CMP:   Recent Labs   Lab 11/09/18  1156 11/10/18  0423 11/11/18  0504    140 139   K 3.5 3.7 3.4*    106 104   CO2 26 24 28   * 110 125*   BUN 18 17 18   CREATININE 1.2 1.0 1.0   CALCIUM 8.9 9.0 9.3   PROT 6.6  --   --    ALBUMIN 3.3*  --   --    BILITOT 1.0  --   --    ALKPHOS 99  --   --    AST 34  --   --    ALT <5*  --   --    ANIONGAP 8 10 7*   EGFRNONAA 54* >60 >60     Coagulation: No results for input(s): PT, INR, APTT in the last 48 hours.  Lactic Acid:   Recent Labs   Lab 11/09/18  1419   LACTATE 1.5     Magnesium:   Recent Labs   Lab 11/09/18  1156 11/10/18  0423 11/11/18  0504   MG 1.5* 2.0 1.7     Respiratory  Culture: No results for input(s): GSRESP, RESPIRATORYC in the last 48 hours.  Urine Culture: No results for input(s): LABURIN in the last 48 hours.  Urine Studies:   Recent Labs   Lab 11/09/18  1340   COLORU Yellow   APPEARANCEUA Clear   PHUR 7.0   SPECGRAV <=1.005*   PROTEINUA Negative   GLUCUA Negative   KETONESU Negative   BILIRUBINUA Negative   OCCULTUA Negative   NITRITE Negative   UROBILINOGEN 1.0   LEUKOCYTESUR Negative       Significant Imaging: I have reviewed and interpreted all pertinent imaging results/findings within the past 24 hours.    Assessment/Plan:      * Community acquired pneumonia of right upper lobe of lung    -Admitted to inpatient telemetry status  -On admit had infiltrate in RUL, leukocytosis and mild hypoxia but was afebrile without cough  -Remains afebrile and wbc has normalized.  -Flu is negative.    -Blood culture is growin Strep lutetiensis which can be associated with endocarditis and is showing some budding yeast.  -Await sputum culture and urine legionella  Await sensitivities of strep.   -Provide tessalon perles, guaifenesin and albuterol nebulizers as needed  -Treat with rocephin and azithromycin for now but if any fever or decompensation will add vancomycin.    -Consult ID     Streptococcal bacteremia    -Initial blood cultures positive for Strep lutetiensis  -He does not appear sick enough to have bacteremia, but it is growing in both cultures  -As this can be associated with endocarditis, will continue rocephin, repeat cultures, order echo and consult ID  -If any decompensation will add vancomycin.       Hypomagnesemia    -Mag normal today  -Repeat labs in AM.         Acute respiratory failure with hypoxia    -This is mild and without symptoms and actually may be due to poor connectivity between the pulseox and his skin  -Presently satting in the mid 90s on room air with no discomfort  -Continue treatment of pneumonia   -Continue albuterol as needed  -Pulse ox q4h.  -He is DNR         Chronic idiopathic thrombocytopenia    -Platelets mildly low since at least 2015 and have increased today  -Stopped lovenox yesterday after platelets fell below 100  -Monitor closely while in house       Spinal stenosis    -History noted       GERD (gastroesophageal reflux disease)    -Noted  -Continue home PPI       Benign prostatic hyperplasia    -History noted  -Continue flomax       HTN (hypertension)    -Continue home diltiazem  -Provide hydralazine if needed for SBP >180       DM (diabetes mellitus), type 2    -A1c is 6.0  -Hold sitagliptin for now  -Provide diabetic diet  -Treat with SSI ac/hs for now.       Hyperlipemia    -History noted  -Not on treatment at this time       Parkinson disease    -Noted and appears stable  -Will continue home pramipexol       A-fib    -History noted.  Previously was on anticoagulation but due to many falls and at least one subdural hematoma anticoagulation was discontinued and felt unsafe for him  -Monitor on tele  -Continue home diltiazem         VTE Risk Mitigation (From admission, onward)        Ordered     Place sequential compression device  Until discontinued      11/09/18 1727     IP VTE HIGH RISK PATIENT  Once      11/09/18 1727     Place YISEL hose  Until discontinued      11/09/18 1727              Silvano Mac MD  Department of Hospital Medicine   [facility]

## 2018-11-11 NOTE — PROGRESS NOTES
Patient lost IV access.  RN and OC attempted >6 times.  Dr. Mac notified unable to obtain IV access and IV antibiotics ordered.  Dr. Mac stated to try again later this morning.  Will continue to monitor.

## 2018-11-11 NOTE — PLAN OF CARE
ATTN: TEAM   JONATAN PLANNING     PT [facility]     DME : OWN R WALKER      RNCM met with patient at the bedside.      Patient is alert and oriented with no communication barriers.      Prior to admission patient was independent with ADLs. Patient use  DME R WAlKER  .       Patients PCP is correct on the face sheet. Patient choice pharmacy is correct      Patient denies a history of mental illness.         Patients family will transport him home at discharge.         No CM needs identified at this time.       CM team will continue to follow.      11/10/18 1902   Discharge Assessment   Confirmed/corrected address and phone number on facesheet? Yes   Assessment information obtained from? Patient;Other  (SON)   Communicated expected length of stay with patient/caregiver yes   Prior to hospitilization cognitive status: Alert/Oriented   Prior to hospitalization functional status: Independent;Assistive Equipment   Current cognitive status: Alert/Oriented   Current Functional Status: Assistive Equipment;Independent   Lives With alone  (LIVES [facility]ITIVE LIVING )   Is patient able to care for self after discharge? Yes   Patient's perception of discharge disposition admitted as an inpatient   Patient currently being followed by outpatient case management? Yes   If yes, name of outpatient case management following: [facility] outpatient case management   Equipment Currently Used at Home none   Do you have any problems affording any of your prescribed medications? TBD   Is the patient taking medications as prescribed? yes   Does the patient have transportation home? Yes   Transportation Available family or friend will provide   Does the patient receive services at [facility]? Yes   Discharge Plan A Assisted Living   Discharge Plan B Assisted Living   Patient/Family In Agreement With Plan yes

## 2018-11-11 NOTE — PLAN OF CARE
Problem: Patient Care Overview  Goal: Plan of Care Review  Outcome: Ongoing (interventions implemented as appropriate)  Plan of care reviewed with patient and family, VSS.  Patient remains on 2 L NC O2.  Blood sugars checked before meals, no coverage needed.  Patient assisted to reposition self.  Urinary pouch draining.  Patient had BM today.  Patient denies pain. Purposeful rounding completed.  Bed lowered and locked, call bell within reach.  [employer]AS[employer] camera at bedside.  Bed alarm activated.  Will continue to monitor.

## 2018-11-11 NOTE — ASSESSMENT & PLAN NOTE
-Initial blood cultures positive for Strep lutetiensis  -He does not appear sick enough to have bacteremia, but it is growing in both cultures  -As this can be associated with endocarditis, will continue rocephin, repeat cultures, order echo and consult ID  -If any decompensation will add vancomycin.

## 2018-11-11 NOTE — PLAN OF CARE
Budding Yeast in blood culture. With improvement in symptoms, believe most likely a contaminant. Will hold off on starting antifungal until ID makes recommendations

## 2018-11-11 NOTE — ASSESSMENT & PLAN NOTE
-Admitted to inpatient telemetry status  -On admit had infiltrate in RUL, leukocytosis and mild hypoxia but was afebrile without cough  -Remains afebrile and wbc has normalized.  -Flu is negative.    -Blood culture is growin Strep lutetiensis which can be associated with endocarditis and is showing some budding yeast.  -Await sputum culture and urine legionella  Await sensitivities of strep.   -Provide tessalon perles, guaifenesin and albuterol nebulizers as needed  -Treat with rocephin and azithromycin for now but if any fever or decompensation will add vancomycin.    -Consult ID

## 2018-11-11 NOTE — SUBJECTIVE & OBJECTIVE
Interval History: No acute events noted overnight.  He continues to look good and states he feels like his normal self.  Denies cp, sob, n/v/d.  Denies cough today.  Son is at bedside and all questions answered.    Review of Systems   Constitutional: Negative for activity change and appetite change.   HENT: Negative for congestion and dental problem.    Eyes: Negative for discharge and itching.   Respiratory: Negative for apnea and chest tightness.    Cardiovascular: Negative for chest pain and leg swelling.   Gastrointestinal: Negative for abdominal distention and abdominal pain.   Endocrine: Negative for cold intolerance and heat intolerance.   Genitourinary: Negative for difficulty urinating and dysuria.   Musculoskeletal: Negative for arthralgias and back pain.   Allergic/Immunologic: Negative for environmental allergies and food allergies.   Neurological: Positive for tremors. Negative for dizziness.   Hematological: Negative for adenopathy. Does not bruise/bleed easily.   Psychiatric/Behavioral: Negative for agitation and behavioral problems.     Objective:     Vital Signs (Most Recent):  Temp: 97.2 °F (36.2 °C) (11/11/18 0733)  Pulse: 94 (11/11/18 1000)  Resp: (!) 24 (11/11/18 0733)  BP: (!) 157/95 (11/11/18 0733)  SpO2: (!) 94 % (11/11/18 0733) Vital Signs (24h Range):  Temp:  [96.2 °F (35.7 °C)-97.5 °F (36.4 °C)] 97.2 °F (36.2 °C)  Pulse:  [69-94] 94  Resp:  [18-24] 24  SpO2:  [93 %-99 %] 94 %  BP: (130-164)/(75-95) 157/95     Weight: 97.5 kg (215 lb)  Body mass index is 29.16 kg/m².    Intake/Output Summary (Last 24 hours) at 11/11/2018 1059  Last data filed at 11/11/2018 1000  Gross per 24 hour   Intake 850 ml   Output 3250 ml   Net -2400 ml      Physical Exam   Constitutional: He is oriented to person, place, and time. He appears well-developed and well-nourished.   HENT:   Head: Normocephalic and atraumatic.   Eyes: EOM are normal. Pupils are equal, round, and reactive to light.   Neck: Normal range of  motion. Neck supple.   Cardiovascular: Normal rate, regular rhythm and normal heart sounds.   Pulmonary/Chest: Effort normal. No respiratory distress.   Mild coarseness in right upper lobe.  No wheezes.  Overall good air movement.   Abdominal: Soft. Bowel sounds are normal. He exhibits no distension. There is no tenderness.   Musculoskeletal: Normal range of motion. He exhibits edema.   Significant chronic edema to both legs.   Neurological: He is oriented to person, place, and time. No cranial nerve deficit. Coordination normal.   Chronic tremor and dysarthria are both improved today.  Moves all extremities with good strength.   Skin: Skin is warm and dry.   Psychiatric: He has a normal mood and affect. His behavior is normal.   Vitals reviewed.      Significant Labs:   A1C:   Recent Labs   Lab 11/10/18  0016   HGBA1C 6.0*     Blood Culture:   Recent Labs   Lab 11/09/18  1419   LABBLOO Gram stain getachew bottle: Gram positive cocci   Results called to and read back by: Inga Faustin RN  11/10/2018    05:12  STREPTOCOCCUS LUTETIENSIS  Gram stain getachew bottle: Gram positive cocci   Results called to and read back by: Inga Faustin RN  11/10/2018    05:11  Gram stain aer bottle: budding yeast   Results called to and read back by: Linette Brito RN 11/10/2018  21:21  STREPTOCOCCUS LUTETIENSIS     BMP:   Recent Labs   Lab 11/11/18  0504   *      K 3.4*      CO2 28   BUN 18   CREATININE 1.0   CALCIUM 9.3   MG 1.7     CBC:   Recent Labs   Lab 11/09/18  1156 11/10/18  0423 11/11/18  0504   WBC 17.26* 8.31 8.02   HGB 14.2 14.8 14.3   HCT 42.8 45.4 43.8   * 99* 119*     CMP:   Recent Labs   Lab 11/09/18  1156 11/10/18  0423 11/11/18  0504    140 139   K 3.5 3.7 3.4*    106 104   CO2 26 24 28   * 110 125*   BUN 18 17 18   CREATININE 1.2 1.0 1.0   CALCIUM 8.9 9.0 9.3   PROT 6.6  --   --    ALBUMIN 3.3*  --   --    BILITOT 1.0  --   --    ALKPHOS 99  --   --    AST 34  --    --    ALT <5*  --   --    ANIONGAP 8 10 7*   EGFRNONAA 54* >60 >60     Coagulation: No results for input(s): PT, INR, APTT in the last 48 hours.  Lactic Acid:   Recent Labs   Lab 11/09/18  1419   LACTATE 1.5     Magnesium:   Recent Labs   Lab 11/09/18  1156 11/10/18  0423 11/11/18  0504   MG 1.5* 2.0 1.7     Respiratory Culture: No results for input(s): GSRESP, RESPIRATORYC in the last 48 hours.  Urine Culture: No results for input(s): LABURIN in the last 48 hours.  Urine Studies:   Recent Labs   Lab 11/09/18  1340   COLORU Yellow   APPEARANCEUA Clear   PHUR 7.0   SPECGRAV <=1.005*   PROTEINUA Negative   GLUCUA Negative   KETONESU Negative   BILIRUBINUA Negative   OCCULTUA Negative   NITRITE Negative   UROBILINOGEN 1.0   LEUKOCYTESUR Negative       Significant Imaging: I have reviewed and interpreted all pertinent imaging results/findings within the past 24 hours.

## 2018-11-11 NOTE — ASSESSMENT & PLAN NOTE
-Platelets mildly low since at least 2015 and have increased today  -Stopped lovenox yesterday after platelets fell below 100  -Monitor closely while in house

## 2018-11-11 NOTE — PLAN OF CARE
Problem: Patient Care Overview  Goal: Plan of Care Review  Outcome: Ongoing (interventions implemented as appropriate)  Pt remained free of falls. Bed locked in lowest position, call light and personal items within reach. Vitals signs stable, 2L NC. No complaints of pain. Catheter bag in place, draining to gravity. Will continue to monitor.

## 2018-11-12 LAB
ANION GAP SERPL CALC-SCNC: 10 MMOL/L
AORTIC ROOT ANNULUS: 3.61 CM
AORTIC VALVE CUSP SEPERATION: 0.97 CM
AV MEAN GRADIENT: 5.82 MMHG
AV PEAK GRADIENT: 10.24 MMHG
AV VALVE AREA: 1.19 CM2
BASOPHILS # BLD AUTO: 0.01 K/UL
BASOPHILS NFR BLD: 0.2 %
BSA FOR ECHO PROCEDURE: 2.23 M2
BUN SERPL-MCNC: 18 MG/DL
CALCIUM SERPL-MCNC: 9.3 MG/DL
CHLORIDE SERPL-SCNC: 104 MMOL/L
CO2 SERPL-SCNC: 25 MMOL/L
CREAT SERPL-MCNC: 1 MG/DL
CV ECHO LV RWT: 0.77 CM
DIFFERENTIAL METHOD: ABNORMAL
DOP CALC AO PEAK VEL: 1.6 M/S
DOP CALC AO VTI: 28.98 CM
DOP CALC LVOT AREA: 3.17 CM2
DOP CALC LVOT DIAMETER: 2.01 CM
DOP CALC LVOT STROKE VOLUME: 34.57 CM3
DOP CALCLVOT PEAK VEL VTI: 10.9 CM
E/E' RATIO: 11.29
ECHO LV POSTERIOR WALL: 1.64 CM (ref 0.6–1.1)
EOSINOPHIL # BLD AUTO: 0.3 K/UL
EOSINOPHIL NFR BLD: 4.7 %
ERYTHROCYTE [DISTWIDTH] IN BLOOD BY AUTOMATED COUNT: 13.9 %
EST. GFR  (AFRICAN AMERICAN): >60 ML/MIN/1.73 M^2
EST. GFR  (NON AFRICAN AMERICAN): >60 ML/MIN/1.73 M^2
FRACTIONAL SHORTENING: 37 % (ref 28–44)
GLUCOSE SERPL-MCNC: 138 MG/DL
HCT VFR BLD AUTO: 42 %
HGB BLD-MCNC: 13.9 G/DL
INTERVENTRICULAR SEPTUM: 1.57 CM (ref 0.6–1.1)
LA MAJOR: 7.56 CM
LA MINOR: 6.36 CM
LA WIDTH: 5.11 CM
LEFT ATRIUM SIZE: 5.27 CM
LEFT ATRIUM VOLUME INDEX: 70.9 ML/M2
LEFT ATRIUM VOLUME: 158.13 CM3
LEFT INTERNAL DIMENSION IN SYSTOLE: 2.69 CM (ref 2.1–4)
LEFT VENTRICLE DIASTOLIC VOLUME INDEX: 36.34 ML/M2
LEFT VENTRICLE DIASTOLIC VOLUME: 81.03 ML
LEFT VENTRICLE MASS INDEX: 126.9 G/M2
LEFT VENTRICLE SYSTOLIC VOLUME INDEX: 12 ML/M2
LEFT VENTRICLE SYSTOLIC VOLUME: 26.7 ML
LEFT VENTRICULAR INTERNAL DIMENSION IN DIASTOLE: 4.26 CM (ref 3.5–6)
LEFT VENTRICULAR MASS: 283.08 G
LV LATERAL E/E' RATIO: 13.17
LV SEPTAL E/E' RATIO: 9.88
LYMPHOCYTES # BLD AUTO: 1.1 K/UL
LYMPHOCYTES NFR BLD: 17.9 %
MAGNESIUM SERPL-MCNC: 1.6 MG/DL
MCH RBC QN AUTO: 32.3 PG
MCHC RBC AUTO-ENTMCNC: 33.1 G/DL
MCV RBC AUTO: 97 FL
MONOCYTES # BLD AUTO: 0.5 K/UL
MONOCYTES NFR BLD: 8.2 %
MV PEAK E VEL: 0.79 M/S
NEUTROPHILS # BLD AUTO: 4.2 K/UL
NEUTROPHILS NFR BLD: 68.5 %
PISA TR MAX VEL: 2.77 M/S
PLATELET # BLD AUTO: 127 K/UL
PMV BLD AUTO: 11.1 FL
POCT GLUCOSE: 136 MG/DL (ref 70–110)
POCT GLUCOSE: 141 MG/DL (ref 70–110)
POCT GLUCOSE: 190 MG/DL (ref 70–110)
POCT GLUCOSE: 203 MG/DL (ref 70–110)
POTASSIUM SERPL-SCNC: 3.6 MMOL/L
PV PEAK VELOCITY: 0.74 CM/S
RA MAJOR: 7.24 CM
RA WIDTH: 4.79 CM
RBC # BLD AUTO: 4.31 M/UL
SINUS: 3.21 CM
SODIUM SERPL-SCNC: 139 MMOL/L
STJ: 2.59 CM
TDI LATERAL: 0.06
TDI SEPTAL: 0.08
TDI: 0.07
TR MAX PG: 30.69 MMHG
WBC # BLD AUTO: 6.19 K/UL

## 2018-11-12 PROCEDURE — 25000003 PHARM REV CODE 250: Performed by: HOSPITALIST

## 2018-11-12 PROCEDURE — 99233 SBSQ HOSP IP/OBS HIGH 50: CPT | Mod: ,,, | Performed by: HOSPITALIST

## 2018-11-12 PROCEDURE — 97110 THERAPEUTIC EXERCISES: CPT

## 2018-11-12 PROCEDURE — 11000001 HC ACUTE MED/SURG PRIVATE ROOM

## 2018-11-12 PROCEDURE — 36415 COLL VENOUS BLD VENIPUNCTURE: CPT

## 2018-11-12 PROCEDURE — 97116 GAIT TRAINING THERAPY: CPT

## 2018-11-12 PROCEDURE — 94761 N-INVAS EAR/PLS OXIMETRY MLT: CPT

## 2018-11-12 PROCEDURE — C1751 CATH, INF, PER/CENT/MIDLINE: HCPCS

## 2018-11-12 PROCEDURE — 83735 ASSAY OF MAGNESIUM: CPT

## 2018-11-12 PROCEDURE — 76937 US GUIDE VASCULAR ACCESS: CPT

## 2018-11-12 PROCEDURE — 36569 INSJ PICC 5 YR+ W/O IMAGING: CPT

## 2018-11-12 PROCEDURE — 97535 SELF CARE MNGMENT TRAINING: CPT

## 2018-11-12 PROCEDURE — 85025 COMPLETE CBC W/AUTO DIFF WBC: CPT

## 2018-11-12 PROCEDURE — 99900035 HC TECH TIME PER 15 MIN (STAT)

## 2018-11-12 PROCEDURE — 80048 BASIC METABOLIC PNL TOTAL CA: CPT

## 2018-11-12 PROCEDURE — 99223 1ST HOSP IP/OBS HIGH 75: CPT | Mod: ,,, | Performed by: INTERNAL MEDICINE

## 2018-11-12 PROCEDURE — 63600175 PHARM REV CODE 636 W HCPCS: Performed by: HOSPITALIST

## 2018-11-12 PROCEDURE — 97530 THERAPEUTIC ACTIVITIES: CPT

## 2018-11-12 PROCEDURE — 27000221 HC OXYGEN, UP TO 24 HOURS

## 2018-11-12 RX ADMIN — CARBIDOPA AND LEVODOPA 2 TABLET: 25; 100 TABLET ORAL at 08:11

## 2018-11-12 RX ADMIN — GUAIFENESIN 600 MG: 600 TABLET, EXTENDED RELEASE ORAL at 08:11

## 2018-11-12 RX ADMIN — AZITHROMYCIN MONOHYDRATE 500 MG: 500 INJECTION, POWDER, LYOPHILIZED, FOR SOLUTION INTRAVENOUS at 02:11

## 2018-11-12 RX ADMIN — TAMSULOSIN HYDROCHLORIDE 0.4 MG: 0.4 CAPSULE ORAL at 08:11

## 2018-11-12 RX ADMIN — INSULIN ASPART 2 UNITS: 100 INJECTION, SOLUTION INTRAVENOUS; SUBCUTANEOUS at 12:11

## 2018-11-12 RX ADMIN — FLUTICASONE PROPIONATE 50 MCG: 50 SPRAY, METERED NASAL at 08:11

## 2018-11-12 RX ADMIN — GABAPENTIN 300 MG: 300 CAPSULE ORAL at 08:11

## 2018-11-12 RX ADMIN — MULTIPLE VITAMINS W/ MINERALS TAB 1 TABLET: TAB at 08:11

## 2018-11-12 RX ADMIN — CARBIDOPA AND LEVODOPA 2 TABLET: 25; 100 TABLET ORAL at 02:11

## 2018-11-12 RX ADMIN — FUROSEMIDE 20 MG: 20 TABLET ORAL at 08:11

## 2018-11-12 RX ADMIN — PRAMIPEXOLE DIHYDROCHLORIDE 1 MG: 0.5 TABLET ORAL at 08:11

## 2018-11-12 RX ADMIN — LOSARTAN POTASSIUM 50 MG: 50 TABLET, FILM COATED ORAL at 08:11

## 2018-11-12 RX ADMIN — ASPIRIN 325 MG: 325 TABLET, DELAYED RELEASE ORAL at 08:11

## 2018-11-12 RX ADMIN — DILTIAZEM HYDROCHLORIDE 120 MG: 120 CAPSULE, COATED, EXTENDED RELEASE ORAL at 08:11

## 2018-11-12 RX ADMIN — PRAMIPEXOLE DIHYDROCHLORIDE 1 MG: 0.5 TABLET ORAL at 02:11

## 2018-11-12 RX ADMIN — CEFTRIAXONE 1 G: 1 INJECTION, SOLUTION INTRAVENOUS at 02:11

## 2018-11-12 RX ADMIN — PANTOPRAZOLE SODIUM 40 MG: 40 TABLET, DELAYED RELEASE ORAL at 08:11

## 2018-11-12 NOTE — PLAN OF CARE
Problem: Patient Care Overview  Goal: Plan of Care Review  Outcome: Ongoing (interventions implemented as appropriate)  Mr. Parmar rested well overnight, VSS, NAD. Remains on 2LNC, denies SOB. Sputum culture pending - no cough with sputum overnight. Denies pain or discomfort. Urine output adequate via penis pouch. Remains without IV access; plans for PICC line placement today. Tolerating PO intake without difficulty. Assisted with repositioning as needed. Up to date with POC.

## 2018-11-12 NOTE — PLAN OF CARE
Problem: Patient Care Overview  Goal: Plan of Care Review  Outcome: Ongoing (interventions implemented as appropriate)  Pt on 2L NC. Sats 96%. No distress noted. PRN tx not needed. Will continue to monitor.

## 2018-11-12 NOTE — PROCEDURES
Silvano Parmar is a 88 y.o. male patient.    Temp: 96.5 °F (35.8 °C) (11/12/18 1208)  Pulse: 70 (11/12/18 1400)  Resp: 16 (11/12/18 1208)  BP: 136/75 (11/12/18 1208)  SpO2: (!) 92 % (11/12/18 1208)  Weight: 97.5 kg (215 lb) (11/09/18 2358)  Height: 6' (182.9 cm) (11/09/18 2358)    PICC  Date/Time: 11/12/2018 2:00 PM  Location procedure was performed: [facility] PICC LINE PLACEMENT  Performed by: Marychuy Burgos RN  Time out: Immediately prior to procedure a time out was called to verify the correct patient, procedure, equipment, support staff and site/side marked as required  Indications: med administration and vascular access  Anesthesia: local infiltration  Local anesthetic: lidocaine 1% without epinephrine  Anesthetic Total (mL): 1  Preparation: skin prepped with ChloraPrep  Skin prep agent dried: skin prep agent completely dried prior to procedure  Sterile barriers: all five maximum sterile barriers used - cap, mask, sterile gown, sterile gloves, and large sterile sheet  Hand hygiene: hand hygiene performed prior to central venous catheter insertion  Location details: right basilic  Catheter type: double lumen  Catheter size: 5 Fr  Catheter Length: 10cm    Ultrasound guidance: yes  Vessel Caliber: medium and patent, compressibility normal  Vascular Doppler: not done  Needle advanced into vessel with real time Ultrasound guidance.  Guidewire confirmed in vessel.  Sterile sheath used.  no esophageal manometryNumber of attempts: 1  Post-procedure: blood return through all ports, chlorhexidine patch and sterile dressing applied  Technical procedures used: microintroducer with ultrasound  Estimated blood loss (mL): 0  Specimens: No  Implants: No  Tip Termination Explanation: 10 cm midline, unable to thread catheter to subclavian.          Marychuy Burgos  11/12/2018

## 2018-11-12 NOTE — PLAN OF CARE
Problem: Patient Care Overview  Goal: Plan of Care Review  Outcome: Ongoing (interventions implemented as appropriate)  Plan of care reviewed with patient.  IV antibiotics administered per orders.  Cardiac monitoring maintained.  Patient denies pain.  Condom cath in place.  Bed alarm set, avasys camera in use.  Purposeful rounding completed, call bell within reach, no needs at this time.  Will continue to monitor.

## 2018-11-12 NOTE — ASSESSMENT & PLAN NOTE
Patient with strep bacteremia and abnormal CXR with hypoxia.  Likely pneumonia is source of bacteremia- possible additional sources include teeth and colon. Needs dental work done on teeth once discharged. TTE and possible ALYX recommended depending on how aggressive patient would like to be. Would increase ceftriaxone dose to BID for now until endocarditis ruled out.  Would pursue colonoscopy as outpatient for completeness. Repeat blood cultures.  Discussed stain with micro lab. Mycology expert in lab reviewed the gram stain and there is no yeast identified. Appears to be c/w strep, not yeast. Nothing growing on mycology plates either. So will stop micafungin.  Patient's midline had gauze over the site. That needs to be removed, biopatch placed and bandage replaced.  Final antibiotic recs to be determined once echo complete.

## 2018-11-12 NOTE — HPI
Mr. Parmar is an 88 year old man with a history of parkinsons disease, atrial fibrillation, diabetes, spinal stenosis and frequent falls who was found on the floor by his bed this morning at 6:30AM by the staff at [facility] where he resides.  He states he simply slid out of bed to the floor when getting out of be and was unable to get up off the floor.  He denied any injury.  Unfortunately, he was found on the floor again two other times on 11/9. He has no recollection of how those occurred.  He has a long history of falls, and at one point suffered a small subdural hematoma. He denies any fevers, cough, shortness of breath, loss of consciousness, headache, neck stiffness, palpitations or new weakness.  Denies N/V/D. Denies abdominal pain. Does need dental work done- was supposed to have dental appointment on Wednesday. He does not remember having a prior colonoscopy.     In the ER he was found to have a right upper lobe infiltrate on chest xray, a leukocytosis and mild asymptomatic hypoxia and is being admitted for treatment of community acquired pneumonia. Blood cultures from 11/9 were all positive for Strep luteutiensis. The gram stain reported yeast. The patient is currently on azithro, CTX, and lashanda. ID consulted for management and ABX recs.

## 2018-11-12 NOTE — PT/OT/SLP PROGRESS
Physical Therapy Treatment    Patient Name:  Silvano Parmar   MRN:  [id number]    Recommendations:     Discharge Recommendations:  home health PT, home health OT and 24/7 assistance/supervision  Discharge Equipment Recommendations: 3-in-1 commode   Barriers to discharge: decreased caregiver assistance    Assessment:     Silvano Parmar is a 88 y.o. male admitted with a medical diagnosis of Community acquired pneumonia of right upper lobe of lung.  He presents with the following impairments/functional limitations:  weakness, impaired endurance, impaired self care skills, impaired functional mobilty, gait instability, impaired balance, impaired cognition, decreased lower extremity function, decreased safety awareness ; pt with improved mobility today, inc. Amb dist, though still req's CGA/min.A for t/f's and amb..    Rehab Prognosis:  good; patient would benefit from acute skilled PT services to address these deficits and reach maximum level of function.      Recent Surgery: * No surgery found *      Plan:     During this hospitalization, patient to be seen 6 x/week to address the above listed problems via gait training, therapeutic activities, therapeutic exercises  · Plan of Care Expires:  12/10/18   Plan of Care Reviewed with: patient    Subjective     Communicated with nurse prior to session.  Patient found supine in bed, bed alarm on, and Av[employer] monitoring upon PT entry to room, agreeable to treatment.      Chief Complaint: pt had no c/o's  Patient comments/goals: pt stated he needed to use the bathroom (urinate), though has a condom catheter.  Pain/Comfort:  · Pain Rating 1: 0/10  · Pain Rating Post-Intervention 1: 0/10    Patients cultural, spiritual, Christianity conflicts given the current situation: none stated    Objective:     Patient found with: Condom Catheter, telemetry, SCD, oxygen, bed alarm(O2@2L)     General Precautions: Standard, fall   Orthopedic Precautions:N/A   Braces: N/A     Functional  Mobility:  · Bed Mobility:     · Supine to Sit: contact guard assistance and minimum assistance  · Transfers:     · Sit to Stand:  contact guard assistance and minimum assistance with rolling walker  · Gait: amb'd 80' with RW and CGA, occ min.A for turning RW and cueing for upright posture. O2@2L:94%      AM-PAC 6 CLICK MOBILITY  Turning over in bed (including adjusting bedclothes, sheets and blankets)?: 3  Sitting down on and standing up from a chair with arms (e.g., wheelchair, bedside commode, etc.): 3  Moving from lying on back to sitting on the side of the bed?: 3  Moving to and from a bed to a chair (including a wheelchair)?: 3  Need to walk in hospital room?: 3  Climbing 3-5 steps with a railing?: 3  Basic Mobility Total Score: 18       Therapeutic Activities and Exercises:   pt perf'd seated LE ex's of heel/toe raises, hip flex, LAQ's x 10 ea.   Chair alarm placed, but unable to activate, nsg. Notified. [employer] monitoring system in place.    Patient left up in chair with all lines intact, call button in reach and nurse notified..    GOALS:   Multidisciplinary Problems     Physical Therapy Goals        Problem: Physical Therapy Goal    Goal Priority Disciplines Outcome Goal Variances Interventions   Physical Therapy Goal     PT, PT/OT      Description:  Goals to be met by 11-17-18.  1. Sup<>sit mod I  2. Sit<>stand with RW mod I  3. amb 150' with RW mod I                      Time Tracking:     PT Received On: 11/12/18  PT Start Time: 0904     PT Stop Time: 0932  PT Total Time (min): 28 min     Billable Minutes: Gait Training 16 and Therapeutic Exercise 12    Treatment Type: Treatment  PT/PTA: PTA     PTA Visit Number: 1     Constance Damian PTA  11/12/2018

## 2018-11-12 NOTE — SUBJECTIVE & OBJECTIVE
Interval History: reports he is comfortable but mildly short of breath. Mildly slow to respond.    Review of Systems   Constitutional: Positive for activity change. Negative for appetite change, chills and fever.   HENT: Positive for dental problem. Negative for congestion, facial swelling, mouth sores, sore throat and trouble swallowing.    Eyes: Negative for photophobia, pain and redness.   Respiratory: Negative for cough, chest tightness and wheezing.    Cardiovascular: Positive for leg swelling. Negative for chest pain.   Gastrointestinal: Negative for abdominal distention, abdominal pain, diarrhea, nausea and vomiting.   Endocrine: Negative for heat intolerance.   Genitourinary: Negative for difficulty urinating, flank pain and urgency.   Musculoskeletal: Negative for arthralgias and joint swelling.   Skin: Negative for rash.   Neurological: Positive for weakness. Negative for speech difficulty and headaches.   Hematological: Negative for adenopathy.   Psychiatric/Behavioral: Negative for behavioral problems and hallucinations.     Objective:     Vital Signs (Most Recent):  Temp: 96.1 °F (35.6 °C) (11/12/18 1633)  Pulse: 74 (11/12/18 1633)  Resp: 16 (11/12/18 1633)  BP: (!) 140/78 (11/12/18 1633)  SpO2: (!) 92 % (11/12/18 1633) Vital Signs (24h Range):  Temp:  [96.1 °F (35.6 °C)-98.6 °F (37 °C)] 96.1 °F (35.6 °C)  Pulse:  [68-80] 74  Resp:  [16-20] 16  SpO2:  [92 %-97 %] 92 %  BP: (122-152)/(73-86) 140/78     Weight: 97.5 kg (215 lb)  Body mass index is 29.16 kg/m².    Estimated Creatinine Clearance: 61.8 mL/min (based on SCr of 1 mg/dL).    Physical Exam   Constitutional: He appears well-developed and well-nourished. He is cooperative. He is easily aroused.  Non-toxic appearance. No distress.   Mild masked facies.   HENT:   Head: Normocephalic and atraumatic. Head is without right periorbital erythema and without left periorbital erythema.   Right Ear: Hearing and external ear normal. No swelling.   Left Ear:  Hearing and external ear normal. No swelling.   Nose: Nose normal.   Mouth/Throat: Oropharynx is clear and moist. No oropharyngeal exudate.   Very poor dentition   Eyes: Conjunctivae, EOM and lids are normal. Pupils are equal, round, and reactive to light. Right eye exhibits no discharge and no exudate. Left eye exhibits no discharge and no exudate. Right conjunctiva is not injected. Right conjunctiva has no hemorrhage. Left conjunctiva is not injected. Left conjunctiva has no hemorrhage. No scleral icterus.   Neck: Normal range of motion. Neck supple. No tracheal deviation present.   Cardiovascular: Normal rate, regular rhythm and normal heart sounds. Exam reveals no gallop and no friction rub.   No murmur heard.  Pulmonary/Chest: Breath sounds normal. No accessory muscle usage or stridor. No apnea. No respiratory distress. He has no wheezes. He has no rales. He exhibits no tenderness.   Mildly SOB, nasal canula not in place so re adjusted   Abdominal: Soft. Normal appearance and bowel sounds are normal. He exhibits no distension and no mass. There is no tenderness. There is no rebound and no guarding.   Musculoskeletal: Normal range of motion. He exhibits edema. He exhibits no tenderness.   Upper extremity and LE edema. Pitting-2+LE.  Arthritis changes DIP both hands.  Right arm midline in place with gauze. Stat lock also in place.   Neurological: He is alert and easily aroused. No cranial nerve deficit. Coordination normal.   Skin: Skin is warm, dry and intact. No lesion and no rash noted. No cyanosis or erythema. No pallor. Nails show no clubbing.   Psychiatric: He has a normal mood and affect. His speech is normal and behavior is normal. Judgment normal.   Nursing note and vitals reviewed.      Significant Labs:   Blood Culture:   Recent Labs   Lab 11/09/18  1419   LABBLOO Gram stain getachew bottle: Gram positive cocci   Results called to and read back by: Inga Faustin RN  11/10/2018    05:12  STREPTOCOCCUS  LUTETIENSIS  For susceptibility see order #[id number]    Gram stain getachew bottle: Gram positive cocci   Results called to and read back by: Inga Faustin RN  11/10/2018    05:11  Results called to and read back by: Linette Brito RN 11/10/2018  21:21  Gram stain aer bottle: Gram positive cocci in chains resembling Strep  Results reviewed and amended. Called to and read back by:Dr. Kathy Baumgarten 11/12/2018  14:00   Previously reported as budding yeast  STREPTOCOCCUS LUTETIENSIS  Susceptibility pending         Significant Imaging: I have reviewed all pertinent imaging results/findings within the past 24 hours.

## 2018-11-12 NOTE — ASSESSMENT & PLAN NOTE
-Platelets mildly low since at least 2015 and have increased today  -Stopped lovenox 11/10 after platelets fell below 100  -Monitor closely while in house

## 2018-11-12 NOTE — ASSESSMENT & PLAN NOTE
-Admitted to inpatient telemetry status  -On admit had infiltrate in RUL, leukocytosis and mild hypoxia but was afebrile without cough  -Remains afebrile and wbc quickly normalized.  -Flu is negative.    -Blood culture has grown Strep lutetiensis in two bottles.  This can be associated with endocarditis.  Furthermore initial gram stains show some budding yeast.  -Await sputum culture and urine legionella  Await sensitivities of strep.   -Provide tessalon perles, guaifenesin and albuterol nebulizers as needed  -Continue to treat with rocephin and azithromycin for now but if any fever or decompensation will add vancomycin.  ID started micafungin last night when consult called in  -Await full ID consult

## 2018-11-12 NOTE — CONSULTS
[facility]  Infectious Disease  Consult Note    Patient Name: Silvano Parmar  MRN: [id number]  Admission Date: 11/9/2018  Hospital Length of Stay: 3 days  Attending Physician: Silvano Mac MD  Primary Care Provider: Silvestre Ramirez MD     Isolation Status: No active isolations    Patient information was obtained from patient, past medical records and ER records.      Inpatient consult to Infectious Diseases  Consult performed by: Katherine L. Baumgarten, MD  Consult ordered by: Jason Garcia NP  Reason for consult: bacteremia  Assessment/Recommendations: Patient seen and examined. Labs and chart reviewed. Please see progress note of same date.            Katherine K Baumgarten, MD  Infectious Disease  [facility]

## 2018-11-12 NOTE — PLAN OF CARE
Problem: Physical Therapy Goal  Goal: Physical Therapy Goal  Goals to be met by 11-17-18.  1. Sup<>sit mod I  2. Sit<>stand with RW mod I  3. amb 150' with RW mod I     Pt progressing towards goals, sup to sit to stand CGA/min.A, amb'd 80' with RW and CGA/occ min.A, O2@2L:94%. Return to [facility] with increased assistance temporarily and HHPT.

## 2018-11-12 NOTE — PLAN OF CARE
Problem: Occupational Therapy Goal  Goal: Occupational Therapy Goal  Goals to be met by 12/10/18  1. Assess self-feeding  2. SBA G/H seated EOB  3. Mod A UBD seated EOB or chair  4. Assess bed-chair transfer   Outcome: Ongoing (interventions implemented as appropriate)  Pt is making steady progress towards his OT goals. Goals remain appropriate at this time.     Marissa Franz, OTR/L  11/12/2018

## 2018-11-12 NOTE — ASSESSMENT & PLAN NOTE
-Initial blood cultures positive for Strep lutetiensis and gram stain positive for budding yeast.  -He does not appear sick enough to have bacteremia, but it is growing in both cultures  -As this can be associated with endocarditis, will continue rocephin.  Await blood cultures repeated on 11/11.  Await Echocardiogram and full ID consult.    -If any decompensation will add vancomycin.  -Started on micafungin by ID yesterday evening.

## 2018-11-12 NOTE — PROGRESS NOTES
[facility]  Infectious Disease  Progress Note    Patient Name: Silvano Parmar  MRN: [id number]  Admission Date: 11/9/2018  Length of Stay: 3 days  Attending Physician: Silvano Mac MD  Primary Care Provider: Silvestre Ramirez MD    Isolation Status: No active isolations  Assessment/Plan:      * Community acquired pneumonia of right upper lobe of lung          Streptococcal bacteremia    Patient with strep bacteremia and abnormal CXR with hypoxia.  Likely pneumonia is source of bacteremia- possible additional sources include teeth and colon. Needs dental work done on teeth once discharged. TTE and possible ALYX recommended depending on how aggressive patient would like to be. Would increase ceftriaxone dose to BID for now until endocarditis ruled out.  Would pursue colonoscopy as outpatient for completeness. Repeat blood cultures.  Discussed stain with micro lab. Mycology expert in lab reviewed the gram stain and there is no yeast identified. Appears to be c/w strep, not yeast. Nothing growing on mycology plates either. So will stop micafungin.  Patient's midline had gauze over the site. That needs to be removed, biopatch placed and bandage replaced.  Final antibiotic recs to be determined once echo complete.           Thank you for your consult. I will follow-up with patient. Please contact us if you have any additional questions.    Katherine K Baumgarten, MD  Infectious Disease  [facility]    Subjective:     Principal Problem:Community acquired pneumonia of right upper lobe of lung    HPI: Mr. Parmar is an 88 year old man with a history of parkinsons disease, atrial fibrillation, diabetes, spinal stenosis and frequent falls who was found on the floor by his bed this morning at 6:30AM by the staff at [facility] where [facility].  He states he simply slid out of bed to the floor when getting out of be and was unable to get up off the floor.  He denied any injury.   Unfortunately, he was found on the floor again two other times on 11/9. He has no recollection of how those occurred.  He has a long history of falls, and at one point suffered a small subdural hematoma. He denies any fevers, cough, shortness of breath, loss of consciousness, headache, neck stiffness, palpitations or new weakness.   Denies N/V/D. Denies abdominal pain. Does need dental work done- was supposed to have dental appointment on Wednesday. He does not remember having a prior colonoscopy.     In the ER he was found to have a right upper lobe infiltrate on chest xray, a leukocytosis and mild asymptomatic hypoxia and is being admitted for treatment of community acquired pneumonia. Blood cultures from 11/9 were all positive for Strep luteutiensis. The gram stain reported yeast. The patient is currently on azithro, CTX, and lashanda. ID consulted for management and ABX recs.        Interval History: reports he is comfortable but mildly short of breath. Mildly slow to respond.    Review of Systems   Constitutional: Positive for activity change. Negative for appetite change, chills and fever.   HENT: Positive for dental problem. Negative for congestion, facial swelling, mouth sores, sore throat and trouble swallowing.    Eyes: Negative for photophobia, pain and redness.   Respiratory: Negative for cough, chest tightness and wheezing.    Cardiovascular: Positive for leg swelling. Negative for chest pain.   Gastrointestinal: Negative for abdominal distention, abdominal pain, diarrhea, nausea and vomiting.   Endocrine: Negative for heat intolerance.   Genitourinary: Negative for difficulty urinating, flank pain and urgency.   Musculoskeletal: Negative for arthralgias and joint swelling.   Skin: Negative for rash.   Neurological: Positive for weakness. Negative for speech difficulty and headaches.   Hematological: Negative for adenopathy.   Psychiatric/Behavioral: Negative for behavioral problems and hallucinations.      Objective:     Vital Signs (Most Recent):  Temp: 96.1 °F (35.6 °C) (11/12/18 1633)  Pulse: 74 (11/12/18 1633)  Resp: 16 (11/12/18 1633)  BP: (!) 140/78 (11/12/18 1633)  SpO2: (!) 92 % (11/12/18 1633) Vital Signs (24h Range):  Temp:  [96.1 °F (35.6 °C)-98.6 °F (37 °C)] 96.1 °F (35.6 °C)  Pulse:  [68-80] 74  Resp:  [16-20] 16  SpO2:  [92 %-97 %] 92 %  BP: (122-152)/(73-86) 140/78     Weight: 97.5 kg (215 lb)  Body mass index is 29.16 kg/m².    Estimated Creatinine Clearance: 61.8 mL/min (based on SCr of 1 mg/dL).    Physical Exam   Constitutional: He appears well-developed and well-nourished. He is cooperative. He is easily aroused.  Non-toxic appearance. No distress.   Mild masked facies.   HENT:   Head: Normocephalic and atraumatic. Head is without right periorbital erythema and without left periorbital erythema.   Right Ear: Hearing and external ear normal. No swelling.   Left Ear: Hearing and external ear normal. No swelling.   Nose: Nose normal.   Mouth/Throat: Oropharynx is clear and moist. No oropharyngeal exudate.   Very poor dentition   Eyes: Conjunctivae, EOM and lids are normal. Pupils are equal, round, and reactive to light. Right eye exhibits no discharge and no exudate. Left eye exhibits no discharge and no exudate. Right conjunctiva is not injected. Right conjunctiva has no hemorrhage. Left conjunctiva is not injected. Left conjunctiva has no hemorrhage. No scleral icterus.   Neck: Normal range of motion. Neck supple. No tracheal deviation present.   Cardiovascular: Normal rate, regular rhythm and normal heart sounds. Exam reveals no gallop and no friction rub.   No murmur heard.  Pulmonary/Chest: Breath sounds normal. No accessory muscle usage or stridor. No apnea. No respiratory distress. He has no wheezes. He has no rales. He exhibits no tenderness.   Mildly SOB, nasal canula not in place so re adjusted   Abdominal: Soft. Normal appearance and bowel sounds are normal. He exhibits no distension  and no mass. There is no tenderness. There is no rebound and no guarding.   Musculoskeletal: Normal range of motion. He exhibits edema. He exhibits no tenderness.   Upper extremity and LE edema. Pitting-2+LE.  Arthritis changes DIP both hands.  Right arm midline in place with gauze. Stat lock also in place.   Neurological: He is alert and easily aroused. No cranial nerve deficit. Coordination normal.   Skin: Skin is warm, dry and intact. No lesion and no rash noted. No cyanosis or erythema. No pallor. Nails show no clubbing.   Psychiatric: He has a normal mood and affect. His speech is normal and behavior is normal. Judgment normal.   Nursing note and vitals reviewed.      Significant Labs:   Blood Culture:   Recent Labs   Lab 11/09/18  1419   LABBLOO Gram stain getachew bottle: Gram positive cocci   Results called to and read back by: Inga Faustin RN  11/10/2018    05:12  STREPTOCOCCUS LUTETIENSIS  For susceptibility see order #[id number]    Gram stain getachew bottle: Gram positive cocci   Results called to and read back by: Inga Faustin RN  11/10/2018    05:11  Results called to and read back by: Linette Brito RN 11/10/2018  21:21  Gram stain aer bottle: Gram positive cocci in chains resembling Strep  Results reviewed and amended. Called to and read back by:Dr. Kathy Baumgarten 11/12/2018  14:00   Previously reported as budding yeast  STREPTOCOCCUS LUTETIENSIS  Susceptibility pending         Significant Imaging: I have reviewed all pertinent imaging results/findings within the past 24 hours.

## 2018-11-12 NOTE — SUBJECTIVE & OBJECTIVE
Interval History: No acute events noted overnight.  He continues to look good and states he feels like his normal self.  Denies cp, sob, n/v/d.  Denies cough today.  Micafungin started overnight by ID    Review of Systems   Constitutional: Negative for activity change and appetite change.   HENT: Negative for congestion and dental problem.    Eyes: Negative for discharge and itching.   Respiratory: Negative for apnea and chest tightness.    Cardiovascular: Negative for chest pain and leg swelling.   Gastrointestinal: Negative for abdominal distention and abdominal pain.   Endocrine: Negative for cold intolerance and heat intolerance.   Genitourinary: Negative for difficulty urinating and dysuria.   Musculoskeletal: Negative for arthralgias and back pain.   Allergic/Immunologic: Negative for environmental allergies and food allergies.   Neurological: Positive for tremors. Negative for dizziness.   Hematological: Negative for adenopathy. Does not bruise/bleed easily.   Psychiatric/Behavioral: Negative for agitation and behavioral problems.     Objective:     Vital Signs (Most Recent):  Temp: 97.6 °F (36.4 °C) (11/12/18 0900)  Pulse: 72 (11/12/18 1000)  Resp: 18 (11/12/18 0705)  BP: (!) 149/86 (11/12/18 0705)  SpO2: (!) 94 % (11/12/18 0705) Vital Signs (24h Range):  Temp:  [96.4 °F (35.8 °C)-98.6 °F (37 °C)] 97.6 °F (36.4 °C)  Pulse:  [68-80] 72  Resp:  [16-20] 18  SpO2:  [93 %-97 %] 94 %  BP: (122-152)/(73-86) 149/86     Weight: 97.5 kg (215 lb)  Body mass index is 29.16 kg/m².    Intake/Output Summary (Last 24 hours) at 11/12/2018 1103  Last data filed at 11/12/2018 0540  Gross per 24 hour   Intake 1260 ml   Output 1300 ml   Net -40 ml      Physical Exam   Constitutional: He is oriented to person, place, and time. He appears well-developed and well-nourished.   HENT:   Head: Normocephalic and atraumatic.   Eyes: EOM are normal. Pupils are equal, round, and reactive to light.   Neck: Normal range of motion. Neck  supple.   Cardiovascular: Normal rate, regular rhythm and normal heart sounds.   Pulmonary/Chest: Effort normal. No respiratory distress.   Mild coarseness in right upper lobe.  No wheezes.  Overall good air movement.   Abdominal: Soft. Bowel sounds are normal. He exhibits no distension. There is no tenderness.   Musculoskeletal: Normal range of motion. He exhibits edema.   Significant chronic edema to both legs.   Neurological: He is oriented to person, place, and time. No cranial nerve deficit. Coordination normal.   Chronic tremor and dysarthria are both improved today.  Moves all extremities with good strength.   Skin: Skin is warm and dry.   Psychiatric: He has a normal mood and affect. His behavior is normal.   Vitals reviewed.      Significant Labs:   A1C:   Recent Labs   Lab 11/10/18  0016   HGBA1C 6.0*     Blood Culture:   No results for input(s): LABBLOO in the last 48 hours.  BMP:   Recent Labs   Lab 11/12/18  0528   *      K 3.6      CO2 25   BUN 18   CREATININE 1.0   CALCIUM 9.3   MG 1.6     CBC:   Recent Labs   Lab 11/11/18  0504 11/12/18  0528   WBC 8.02 6.19   HGB 14.3 13.9*   HCT 43.8 42.0   * 127*     CMP:   Recent Labs   Lab 11/11/18  0504 11/12/18  0528    139   K 3.4* 3.6    104   CO2 28 25   * 138*   BUN 18 18   CREATININE 1.0 1.0   CALCIUM 9.3 9.3   ANIONGAP 7* 10   EGFRNONAA >60 >60     Coagulation: No results for input(s): PT, INR, APTT in the last 48 hours.  Lactic Acid:   No results for input(s): LACTATE in the last 48 hours.  Magnesium:   Recent Labs   Lab 11/11/18  0504 11/12/18  0528   MG 1.7 1.6     Respiratory Culture: No results for input(s): GSRESP, RESPIRATORYC in the last 48 hours.  Urine Culture: No results for input(s): LABURIN in the last 48 hours.  Urine Studies:   No results for input(s): COLORU, APPEARANCEUA, PHUR, SPECGRAV, PROTEINUA, GLUCUA, KETONESU, BILIRUBINUA, OCCULTUA, NITRITE, UROBILINOGEN, LEUKOCYTESUR, RBCUA, WBCUA,  BACTERIA, SQUAMEPITHEL, HYALINECASTS in the last 48 hours.    Invalid input(s): BRIDGETT    Significant Imaging: I have reviewed and interpreted all pertinent imaging results/findings within the past 24 hours.

## 2018-11-12 NOTE — PROGRESS NOTES
[facility] Hospital Medicine  Progress Note    Patient Name: Silvano Parmar  MRN: [id number]  Patient Class: IP- Inpatient   Admission Date: 11/9/2018  Length of Stay: 3 days  Attending Physician: Silvano Mac MD  Primary Care Provider: Silvestre Ramirez MD        Subjective:     Principal Problem:Community acquired pneumonia of right upper lobe of lung    HPI:  Mr. Parmar is an 88 year old man with a history of parkinsons disease, atrial fibrillation, diabetes, spinal stenosis and frequent falls who was found on the floor by his bed this morning at 6:30AM by the staff at [facility] where he resides.  He states he simply slid out of bed to the floor when getting out of be and was unable to get up off the floor.  He denied any injury.  Unfortunately, he was found on the floor again two other times today.  He has no recollection of how those occurred.  His son is at the bedside and informs me he has a long history of falls, and at one point suffered a small subdural hematoma.  In general Mr. Parmar ambulates with a walker.  His only complaint at this time is that he has a bit of an increase in right hip discomfort and right thorax discomfort.  He denies knowledge of having fallen on these areas today.He denies any fevers, cough, shortness of breath, loss of consciousness, headache, neck stiffness, palpitations or new weakness.      In the ER he was found to have a right upper lobe infiltrate on chest xray, a leukocytosis and mild asymptomatic hypoxia and is being admitted for treatment of community acquired pneumonia.    Hospital Course:  No notes on file    Interval History: No acute events noted overnight.  He continues to look good and states he feels like his normal self.  Denies cp, sob, n/v/d.  Denies cough today.  Micafungin started overnight by ID    Review of Systems   Constitutional: Negative for activity change and appetite change.   HENT: Negative for congestion and dental  problem.    Eyes: Negative for discharge and itching.   Respiratory: Negative for apnea and chest tightness.    Cardiovascular: Negative for chest pain and leg swelling.   Gastrointestinal: Negative for abdominal distention and abdominal pain.   Endocrine: Negative for cold intolerance and heat intolerance.   Genitourinary: Negative for difficulty urinating and dysuria.   Musculoskeletal: Negative for arthralgias and back pain.   Allergic/Immunologic: Negative for environmental allergies and food allergies.   Neurological: Positive for tremors. Negative for dizziness.   Hematological: Negative for adenopathy. Does not bruise/bleed easily.   Psychiatric/Behavioral: Negative for agitation and behavioral problems.     Objective:     Vital Signs (Most Recent):  Temp: 97.6 °F (36.4 °C) (11/12/18 0900)  Pulse: 72 (11/12/18 1000)  Resp: 18 (11/12/18 0705)  BP: (!) 149/86 (11/12/18 0705)  SpO2: (!) 94 % (11/12/18 0705) Vital Signs (24h Range):  Temp:  [96.4 °F (35.8 °C)-98.6 °F (37 °C)] 97.6 °F (36.4 °C)  Pulse:  [68-80] 72  Resp:  [16-20] 18  SpO2:  [93 %-97 %] 94 %  BP: (122-152)/(73-86) 149/86     Weight: 97.5 kg (215 lb)  Body mass index is 29.16 kg/m².    Intake/Output Summary (Last 24 hours) at 11/12/2018 1103  Last data filed at 11/12/2018 0540  Gross per 24 hour   Intake 1260 ml   Output 1300 ml   Net -40 ml      Physical Exam   Constitutional: He is oriented to person, place, and time. He appears well-developed and well-nourished.   HENT:   Head: Normocephalic and atraumatic.   Eyes: EOM are normal. Pupils are equal, round, and reactive to light.   Neck: Normal range of motion. Neck supple.   Cardiovascular: Normal rate, regular rhythm and normal heart sounds.   Pulmonary/Chest: Effort normal. No respiratory distress.   Mild coarseness in right upper lobe.  No wheezes.  Overall good air movement.   Abdominal: Soft. Bowel sounds are normal. He exhibits no distension. There is no tenderness.   Musculoskeletal: Normal  range of motion. He exhibits edema.   Significant chronic edema to both legs.   Neurological: He is oriented to person, place, and time. No cranial nerve deficit. Coordination normal.   Chronic tremor and dysarthria are both improved today.  Moves all extremities with good strength.   Skin: Skin is warm and dry.   Psychiatric: He has a normal mood and affect. His behavior is normal.   Vitals reviewed.      Significant Labs:   A1C:   Recent Labs   Lab 11/10/18  0016   HGBA1C 6.0*     Blood Culture:   No results for input(s): LABBLOO in the last 48 hours.  BMP:   Recent Labs   Lab 11/12/18 0528   *      K 3.6      CO2 25   BUN 18   CREATININE 1.0   CALCIUM 9.3   MG 1.6     CBC:   Recent Labs   Lab 11/11/18 0504 11/12/18 0528   WBC 8.02 6.19   HGB 14.3 13.9*   HCT 43.8 42.0   * 127*     CMP:   Recent Labs   Lab 11/11/18 0504 11/12/18  0528    139   K 3.4* 3.6    104   CO2 28 25   * 138*   BUN 18 18   CREATININE 1.0 1.0   CALCIUM 9.3 9.3   ANIONGAP 7* 10   EGFRNONAA >60 >60     Coagulation: No results for input(s): PT, INR, APTT in the last 48 hours.  Lactic Acid:   No results for input(s): LACTATE in the last 48 hours.  Magnesium:   Recent Labs   Lab 11/11/18 0504 11/12/18  0528   MG 1.7 1.6     Respiratory Culture: No results for input(s): GSRESP, RESPIRATORYC in the last 48 hours.  Urine Culture: No results for input(s): LABURIN in the last 48 hours.  Urine Studies:   No results for input(s): COLORU, APPEARANCEUA, PHUR, SPECGRAV, PROTEINUA, GLUCUA, KETONESU, BILIRUBINUA, OCCULTUA, NITRITE, UROBILINOGEN, LEUKOCYTESUR, RBCUA, WBCUA, BACTERIA, SQUAMEPITHEL, HYALINECASTS in the last 48 hours.    Invalid input(s): WRIGHTSUR    Significant Imaging: I have reviewed and interpreted all pertinent imaging results/findings within the past 24 hours.    Assessment/Plan:      * Community acquired pneumonia of right upper lobe of lung    -Admitted to inpatient telemetry status  -On  admit had infiltrate in RUL, leukocytosis and mild hypoxia but was afebrile without cough  -Remains afebrile and wbc quickly normalized.  -Flu is negative.    -Blood culture has grown Strep lutetiensis in two bottles.  This can be associated with endocarditis.  Furthermore initial gram stains show some budding yeast.  -Await sputum culture and urine legionella  Await sensitivities of strep.   -Provide tessalon perles, guaifenesin and albuterol nebulizers as needed  -Continue to treat with rocephin and azithromycin for now but if any fever or decompensation will add vancomycin.  ID started micafungin last night when consult called in  -Await full ID consult     Streptococcal bacteremia    -Initial blood cultures positive for Strep lutetiensis and gram stain positive for budding yeast.  -He does not appear sick enough to have bacteremia, but it is growing in both cultures  -As this can be associated with endocarditis, will continue rocephin.  Await blood cultures repeated on 11/11.  Await Echocardiogram and full ID consult.    -If any decompensation will add vancomycin.  -Started on micafungin by ID yesterday evening.       Hypomagnesemia    -Mag normal today  -Repeat labs in AM.         Acute respiratory failure with hypoxia    -This is mild and without symptoms and actually may be due to poor connectivity between the pulseox and his skin  -Presently satting in the mid 90s on room air with no discomfort  -Continue treatment of pneumonia   -Continue albuterol as needed  -Pulse ox q4h.  -He is DNR        Chronic idiopathic thrombocytopenia    -Platelets mildly low since at least 2015 and have increased today  -Stopped lovenox 11/10 after platelets fell below 100  -Monitor closely while in house       Spinal stenosis    -History noted       GERD (gastroesophageal reflux disease)    -Noted  -Continue home PPI       Benign prostatic hyperplasia    -History noted  -Continue flomax       HTN (hypertension)    -Continue home  diltiazem  -Provide hydralazine if needed for SBP >180       DM (diabetes mellitus), type 2    -A1c is 6.0  -Hold sitagliptin for now  -Provide diabetic diet  -Treat with SSI ac/hs for now.       Hyperlipemia    -History noted  -Not on treatment at this time       Parkinson disease    -Noted and appears stable  -Will continue home pramipexol       A-fib    -History noted.  Previously was on anticoagulation but due to many falls and at least one subdural hematoma anticoagulation was discontinued and felt unsafe for him  -Monitor on tele  -Continue home diltiazem         VTE Risk Mitigation (From admission, onward)        Ordered     Place sequential compression device  Until discontinued      11/09/18 1727     IP VTE HIGH RISK PATIENT  Once      11/09/18 1727     Place YISEL hose  Until discontinued      11/09/18 1727              Silvano Mac MD  Department of Hospital Medicine   [facility]

## 2018-11-12 NOTE — PT/OT/SLP PROGRESS
"Occupational Therapy   Treatment    Name: Silvano Parmar  MRN: [id number]  Admitting Diagnosis:  Community acquired pneumonia of right upper lobe of lung       Recommendations:     Discharge Recommendations: home with home health, home health PT, home health OT  Discharge Equipment Recommendations:  3-in-1 commode  Barriers to discharge:  Decreased caregiver support    Subjective     Communicated with: RN prior to session.    Pt reports, "I'm feeling a little dizzy, but I'm ok."    Pain/Comfort:  · Pain Rating 1: 0/10  · Pain Rating Post-Intervention 1: 0/10    Patients cultural, spiritual, Jehovah's witness conflicts given the current situation: none    Objective:     Patient found with: SCD, telemetry, bed alarm, Condom Catheter, oxygen(2L O2 via NC)    General Precautions: Standard, fall   Orthopedic Precautions:N/A   Braces: N/A     Occupational Performance:    Bed Mobility:    · Patient completed Scooting/Bridging with contact guard assistance  · Patient completed Supine to Sit with contact guard assistance cues for safety; initiated transfer with IV on other side of bed prior to OT or RN being ready     Functional Mobility/Transfers:  · Patient completed Sit <> Stand Transfer with contact guard assistance  with  rolling walker, cues for safety- impulsively came into standing without notifying OT; cues for technique and hand placement   · Patient completed Bed <> Chair Transfer using Step Transfer technique with contact guard assistance with rolling walker  · Functional Mobility: short, community distance level using RW with CGA and cues for RW technique- tendency to keep too far in front of him    Activities of Daily Living:  · Grooming: contact guard assistance standing at sink to complete oral care     Patient left up in chair with all lines intact, call button in reach and RN present    [facility] 6 Click:  [facility] Total Score: 11    Treatment & Education:  Pt re-educated on OT role and POC, importance of calling for " (A) for OOB mobility    Pt with notable SOB following ambulation and G/H while standing at sink. SPO2 on RA was 85%. Placed back onto 2 L supplemental oxygen, however prolonged recovery (>2 mins) despite cued pursed lip breathing, therefore increased to 3 L in which his saturations returned to 96%. RN made aware and to re-check oxygen saturations at 4 pm assessment to possibly reduce back down to 2 L supplemental oxygen.   Education:    Assessment:     Silvano Parmar is a 88 y.o. male with a medical diagnosis of Community acquired pneumonia of right upper lobe of lung.  Pt is making steady progress towards his OT goals. Continues to require CGA for functional mobility and cues for RW technique. Remains limited by decreased endurance with SOB and drop in oxygen saturations while on RA. Supplemental oxygen still required for activity. CGA required for G/H standing at sink. Pt impulsive at times, therefore remains high fall risk. Recommend continued use of [employer] for monitoring when OOB. Performance deficits affecting function are weakness, impaired endurance, impaired self care skills, impaired functional mobilty, gait instability, impaired balance, decreased safety awareness, edema, impaired cardiopulmonary response to activity.      Rehab Prognosis:  Good ; patient would benefit from acute skilled OT services to address these deficits and reach maximum level of function.       Plan:     Patient to be seen 5 x/week to address the above listed problems via self-care/home management, therapeutic activities, therapeutic exercises  · Plan of Care Expires:    · Plan of Care Reviewed with: patient    This Plan of care has been discussed with the patient who was involved in its development and understands and is in agreement with the identified goals and treatment plan    GOALS:   Multidisciplinary Problems     Occupational Therapy Goals        Problem: Occupational Therapy Goal    Goal Priority Disciplines Outcome  Interventions   Occupational Therapy Goal     OT, PT/OT Ongoing (interventions implemented as appropriate)    Description:  Goals to be met by 12/10/18  1. Assess self-feeding  2. SBA G/H seated EOB  3. Mod A UBD seated EOB or chair  4. Assess bed-chair transfer                    Time Tracking:     OT Date of Treatment: 11/12/18  OT Start Time: 1515  OT Stop Time: 1550  OT Total Time (min): 35 min    Billable Minutes:Self Care/Home Management 15  Therapeutic Activity 20    NIDIA Espinal/TERRY  11/12/2018

## 2018-11-13 LAB
ANION GAP SERPL CALC-SCNC: 10 MMOL/L
BACTERIA BLD CULT: NORMAL
BASOPHILS # BLD AUTO: 0.01 K/UL
BASOPHILS NFR BLD: 0.2 %
BUN SERPL-MCNC: 18 MG/DL
CALCIUM SERPL-MCNC: 9.5 MG/DL
CHLORIDE SERPL-SCNC: 103 MMOL/L
CO2 SERPL-SCNC: 27 MMOL/L
CREAT SERPL-MCNC: 1 MG/DL
DIFFERENTIAL METHOD: ABNORMAL
EOSINOPHIL # BLD AUTO: 0.4 K/UL
EOSINOPHIL NFR BLD: 5.9 %
ERYTHROCYTE [DISTWIDTH] IN BLOOD BY AUTOMATED COUNT: 13.9 %
EST. GFR  (AFRICAN AMERICAN): >60 ML/MIN/1.73 M^2
EST. GFR  (NON AFRICAN AMERICAN): >60 ML/MIN/1.73 M^2
GLUCOSE SERPL-MCNC: 133 MG/DL
HCT VFR BLD AUTO: 44.3 %
HGB BLD-MCNC: 14.5 G/DL
LYMPHOCYTES # BLD AUTO: 0.8 K/UL
LYMPHOCYTES NFR BLD: 13.5 %
MAGNESIUM SERPL-MCNC: 1.8 MG/DL
MCH RBC QN AUTO: 31.9 PG
MCHC RBC AUTO-ENTMCNC: 32.7 G/DL
MCV RBC AUTO: 98 FL
MONOCYTES # BLD AUTO: 0.7 K/UL
MONOCYTES NFR BLD: 11.2 %
NEUTROPHILS # BLD AUTO: 4.2 K/UL
NEUTROPHILS NFR BLD: 68.7 %
PLATELET # BLD AUTO: 134 K/UL
PMV BLD AUTO: 11.3 FL
POCT GLUCOSE: 112 MG/DL (ref 70–110)
POCT GLUCOSE: 131 MG/DL (ref 70–110)
POCT GLUCOSE: 131 MG/DL (ref 70–110)
POCT GLUCOSE: 154 MG/DL (ref 70–110)
POTASSIUM SERPL-SCNC: 3.7 MMOL/L
RBC # BLD AUTO: 4.54 M/UL
SODIUM SERPL-SCNC: 140 MMOL/L
WBC # BLD AUTO: 6.14 K/UL

## 2018-11-13 PROCEDURE — 27000221 HC OXYGEN, UP TO 24 HOURS

## 2018-11-13 PROCEDURE — 83735 ASSAY OF MAGNESIUM: CPT

## 2018-11-13 PROCEDURE — 97535 SELF CARE MNGMENT TRAINING: CPT

## 2018-11-13 PROCEDURE — 85025 COMPLETE CBC W/AUTO DIFF WBC: CPT

## 2018-11-13 PROCEDURE — 94761 N-INVAS EAR/PLS OXIMETRY MLT: CPT

## 2018-11-13 PROCEDURE — 87040 BLOOD CULTURE FOR BACTERIA: CPT | Mod: 59

## 2018-11-13 PROCEDURE — 25000003 PHARM REV CODE 250: Performed by: HOSPITALIST

## 2018-11-13 PROCEDURE — 99900035 HC TECH TIME PER 15 MIN (STAT)

## 2018-11-13 PROCEDURE — 97110 THERAPEUTIC EXERCISES: CPT

## 2018-11-13 PROCEDURE — 99232 SBSQ HOSP IP/OBS MODERATE 35: CPT | Mod: ,,, | Performed by: HOSPITALIST

## 2018-11-13 PROCEDURE — 80048 BASIC METABOLIC PNL TOTAL CA: CPT

## 2018-11-13 PROCEDURE — 63600175 PHARM REV CODE 636 W HCPCS: Performed by: INTERNAL MEDICINE

## 2018-11-13 PROCEDURE — 63600175 PHARM REV CODE 636 W HCPCS: Performed by: HOSPITALIST

## 2018-11-13 PROCEDURE — 86580 TB INTRADERMAL TEST: CPT | Performed by: HOSPITALIST

## 2018-11-13 PROCEDURE — 36415 COLL VENOUS BLD VENIPUNCTURE: CPT

## 2018-11-13 PROCEDURE — 11000001 HC ACUTE MED/SURG PRIVATE ROOM

## 2018-11-13 RX ORDER — GUAIFENESIN 100 MG/5ML
200 SOLUTION ORAL EVERY 4 HOURS PRN
COMMUNITY
End: 2020-03-25 | Stop reason: HOSPADM

## 2018-11-13 RX ADMIN — PANTOPRAZOLE SODIUM 40 MG: 40 TABLET, DELAYED RELEASE ORAL at 08:11

## 2018-11-13 RX ADMIN — GUAIFENESIN 600 MG: 600 TABLET, EXTENDED RELEASE ORAL at 09:11

## 2018-11-13 RX ADMIN — MULTIPLE VITAMINS W/ MINERALS TAB 1 TABLET: TAB at 09:11

## 2018-11-13 RX ADMIN — TAMSULOSIN HYDROCHLORIDE 0.4 MG: 0.4 CAPSULE ORAL at 08:11

## 2018-11-13 RX ADMIN — ASPIRIN 325 MG: 325 TABLET, DELAYED RELEASE ORAL at 08:11

## 2018-11-13 RX ADMIN — PRAMIPEXOLE DIHYDROCHLORIDE 1 MG: 0.5 TABLET ORAL at 08:11

## 2018-11-13 RX ADMIN — DILTIAZEM HYDROCHLORIDE 120 MG: 120 CAPSULE, COATED, EXTENDED RELEASE ORAL at 08:11

## 2018-11-13 RX ADMIN — CARBIDOPA AND LEVODOPA 2 TABLET: 25; 100 TABLET ORAL at 04:11

## 2018-11-13 RX ADMIN — TUBERCULIN PURIFIED PROTEIN DERIVATIVE 5 UNITS: 5 INJECTION INTRADERMAL at 04:11

## 2018-11-13 RX ADMIN — FUROSEMIDE 20 MG: 20 TABLET ORAL at 08:11

## 2018-11-13 RX ADMIN — CEFTRIAXONE 1 G: 1 INJECTION, SOLUTION INTRAVENOUS at 04:11

## 2018-11-13 RX ADMIN — CEFTRIAXONE 1 G: 1 INJECTION, SOLUTION INTRAVENOUS at 02:11

## 2018-11-13 RX ADMIN — GUAIFENESIN 600 MG: 600 TABLET, EXTENDED RELEASE ORAL at 08:11

## 2018-11-13 RX ADMIN — GABAPENTIN 300 MG: 300 CAPSULE ORAL at 08:11

## 2018-11-13 RX ADMIN — PRAMIPEXOLE DIHYDROCHLORIDE 1 MG: 0.5 TABLET ORAL at 05:11

## 2018-11-13 RX ADMIN — LOSARTAN POTASSIUM 50 MG: 50 TABLET, FILM COATED ORAL at 08:11

## 2018-11-13 RX ADMIN — GABAPENTIN 300 MG: 300 CAPSULE ORAL at 09:11

## 2018-11-13 RX ADMIN — CARBIDOPA AND LEVODOPA 2 TABLET: 25; 100 TABLET ORAL at 09:11

## 2018-11-13 RX ADMIN — FLUTICASONE PROPIONATE 50 MCG: 50 SPRAY, METERED NASAL at 09:11

## 2018-11-13 RX ADMIN — PRAMIPEXOLE DIHYDROCHLORIDE 1 MG: 0.5 TABLET ORAL at 09:11

## 2018-11-13 NOTE — PLAN OF CARE
CM received call from Mikayla  at [facility], stating they do not take IV antibiotics. Family aware.    CM met with pt and his son, Jesus, to obtain choices for SNF for antibiotics. Pt and family chose [facility].    CM sent referral thru [facility] to [facility], called in [facility] and spoke to PT/OT dept.    CM to follow for plans and arrangements.

## 2018-11-13 NOTE — SUBJECTIVE & OBJECTIVE
Interval History: No acute events noted overnight.  He continues to look good and states he feels like his normal self.  Denies cp, sob, n/v/d.  Denies cough today.      Review of Systems   Constitutional: Negative for activity change and appetite change.   HENT: Negative for congestion and dental problem.    Eyes: Negative for discharge and itching.   Respiratory: Negative for apnea and chest tightness.    Cardiovascular: Negative for chest pain and leg swelling.   Gastrointestinal: Negative for abdominal distention and abdominal pain.   Endocrine: Negative for cold intolerance and heat intolerance.   Genitourinary: Negative for difficulty urinating and dysuria.   Musculoskeletal: Negative for arthralgias and back pain.   Allergic/Immunologic: Negative for environmental allergies and food allergies.   Neurological: Positive for tremors. Negative for dizziness.   Hematological: Negative for adenopathy. Does not bruise/bleed easily.   Psychiatric/Behavioral: Negative for agitation and behavioral problems.     Objective:     Vital Signs (Most Recent):  Temp: 97.5 °F (36.4 °C) (11/13/18 0735)  Pulse: 76 (11/13/18 0800)  Resp: 19 (11/13/18 0735)  BP: (!) 166/92 (11/13/18 0735)  SpO2: 95 % (11/13/18 0735) Vital Signs (24h Range):  Temp:  [96.1 °F (35.6 °C)-98.7 °F (37.1 °C)] 97.5 °F (36.4 °C)  Pulse:  [68-76] 76  Resp:  [16-19] 19  SpO2:  [92 %-97 %] 95 %  BP: (136-172)/(75-97) 166/92     Weight: 97.5 kg (215 lb)  Body mass index is 29.16 kg/m².    Intake/Output Summary (Last 24 hours) at 11/13/2018 0952  Last data filed at 11/13/2018 0719  Gross per 24 hour   Intake 300 ml   Output 2050 ml   Net -1750 ml      Physical Exam   Constitutional: He is oriented to person, place, and time. He appears well-developed and well-nourished.   HENT:   Head: Normocephalic and atraumatic.   Eyes: EOM are normal. Pupils are equal, round, and reactive to light.   Neck: Normal range of motion. Neck supple.   Cardiovascular: Normal rate,  regular rhythm and normal heart sounds.   Pulmonary/Chest: Effort normal. No respiratory distress.   Mild coarseness in right upper lobe.  No wheezes.  Overall good air movement.   Abdominal: Soft. Bowel sounds are normal. He exhibits no distension. There is no tenderness.   Musculoskeletal: Normal range of motion. He exhibits edema.   Significant chronic edema to both legs.   Neurological: He is oriented to person, place, and time. No cranial nerve deficit. Coordination normal.   Chronic tremor and dysarthria are both improved today.  Moves all extremities with good strength.   Skin: Skin is warm and dry.   Psychiatric: He has a normal mood and affect. His behavior is normal.   Vitals reviewed.      Significant Labs:   A1C:   Recent Labs   Lab 11/10/18  0016   HGBA1C 6.0*     Blood Culture:   Recent Labs   Lab 11/11/18 2003   LABBLOO No Growth to date     BMP:   Recent Labs   Lab 11/13/18  0610   *      K 3.7      CO2 27   BUN 18   CREATININE 1.0   CALCIUM 9.5   MG 1.8     CBC:   Recent Labs   Lab 11/12/18 0528 11/13/18  0610   WBC 6.19 6.14   HGB 13.9* 14.5   HCT 42.0 44.3   * 134*     CMP:   Recent Labs   Lab 11/12/18 0528 11/13/18  0610    140   K 3.6 3.7    103   CO2 25 27   * 133*   BUN 18 18   CREATININE 1.0 1.0   CALCIUM 9.3 9.5   ANIONGAP 10 10   EGFRNONAA >60 >60     Coagulation: No results for input(s): PT, INR, APTT in the last 48 hours.  Lactic Acid:   No results for input(s): LACTATE in the last 48 hours.  Magnesium:   Recent Labs   Lab 11/12/18 0528 11/13/18  0610   MG 1.6 1.8     Respiratory Culture: No results for input(s): GSRESP, RESPIRATORYC in the last 48 hours.  Urine Culture: No results for input(s): LABURIN in the last 48 hours.  Urine Studies:   No results for input(s): COLORU, APPEARANCEUA, PHUR, SPECGRAV, PROTEINUA, GLUCUA, KETONESU, BILIRUBINUA, OCCULTUA, NITRITE, UROBILINOGEN, LEUKOCYTESUR, RBCUA, WBCUA, BACTERIA, SQUAMEPITHEL,  HYALINECASTS in the last 48 hours.    Invalid input(s): BRIDGETT    Significant Imaging: I have reviewed and interpreted all pertinent imaging results/findings within the past 24 hours.

## 2018-11-13 NOTE — PROGRESS NOTES
[facility] Hospital Medicine  Progress Note    Patient Name: Silvano Parmar  MRN: [id number]  Patient Class: IP- Inpatient   Admission Date: 11/9/2018  Length of Stay: 4 days  Attending Physician: Silvano Mac MD  Primary Care Provider: Silvestre Ramirez MD        Subjective:     Principal Problem:Community acquired pneumonia of right upper lobe of lung    HPI:  Mr. Parmar is an 88 year old man with a history of parkinsons disease, atrial fibrillation, diabetes, spinal stenosis and frequent falls who was found on the floor by his bed this morning at 6:30AM by the staff at [facility] where he resides.  He states he simply slid out of bed to the floor when getting out of be and was unable to get up off the floor.  He denied any injury.  Unfortunately, he was found on the floor again two other times today.  He has no recollection of how those occurred.  His son is at the bedside and informs me he has a long history of falls, and at one point suffered a small subdural hematoma.  In general Mr. Parmar ambulates with a walker.  His only complaint at this time is that he has a bit of an increase in right hip discomfort and right thorax discomfort.  He denies knowledge of having fallen on these areas today.He denies any fevers, cough, shortness of breath, loss of consciousness, headache, neck stiffness, palpitations or new weakness.      In the ER he was found to have a right upper lobe infiltrate on chest xray, a leukocytosis and mild asymptomatic hypoxia and is being admitted for treatment of community acquired pneumonia.    Hospital Course:  No notes on file    Interval History: No acute events noted overnight.  He continues to look good and states he feels like his normal self.  Denies cp, sob, n/v/d.  Denies cough today.      Review of Systems   Constitutional: Negative for activity change and appetite change.   HENT: Negative for congestion and dental problem.    Eyes: Negative for  discharge and itching.   Respiratory: Negative for apnea and chest tightness.    Cardiovascular: Negative for chest pain and leg swelling.   Gastrointestinal: Negative for abdominal distention and abdominal pain.   Endocrine: Negative for cold intolerance and heat intolerance.   Genitourinary: Negative for difficulty urinating and dysuria.   Musculoskeletal: Negative for arthralgias and back pain.   Allergic/Immunologic: Negative for environmental allergies and food allergies.   Neurological: Positive for tremors. Negative for dizziness.   Hematological: Negative for adenopathy. Does not bruise/bleed easily.   Psychiatric/Behavioral: Negative for agitation and behavioral problems.     Objective:     Vital Signs (Most Recent):  Temp: 97.5 °F (36.4 °C) (11/13/18 0735)  Pulse: 76 (11/13/18 0800)  Resp: 19 (11/13/18 0735)  BP: (!) 166/92 (11/13/18 0735)  SpO2: 95 % (11/13/18 0735) Vital Signs (24h Range):  Temp:  [96.1 °F (35.6 °C)-98.7 °F (37.1 °C)] 97.5 °F (36.4 °C)  Pulse:  [68-76] 76  Resp:  [16-19] 19  SpO2:  [92 %-97 %] 95 %  BP: (136-172)/(75-97) 166/92     Weight: 97.5 kg (215 lb)  Body mass index is 29.16 kg/m².    Intake/Output Summary (Last 24 hours) at 11/13/2018 0952  Last data filed at 11/13/2018 0719  Gross per 24 hour   Intake 300 ml   Output 2050 ml   Net -1750 ml      Physical Exam   Constitutional: He is oriented to person, place, and time. He appears well-developed and well-nourished.   HENT:   Head: Normocephalic and atraumatic.   Eyes: EOM are normal. Pupils are equal, round, and reactive to light.   Neck: Normal range of motion. Neck supple.   Cardiovascular: Normal rate, regular rhythm and normal heart sounds.   Pulmonary/Chest: Effort normal. No respiratory distress.   Mild coarseness in right upper lobe.  No wheezes.  Overall good air movement.   Abdominal: Soft. Bowel sounds are normal. He exhibits no distension. There is no tenderness.   Musculoskeletal: Normal range of motion. He exhibits  edema.   Significant chronic edema to both legs.   Neurological: He is oriented to person, place, and time. No cranial nerve deficit. Coordination normal.   Chronic tremor and dysarthria are both improved today.  Moves all extremities with good strength.   Skin: Skin is warm and dry.   Psychiatric: He has a normal mood and affect. His behavior is normal.   Vitals reviewed.      Significant Labs:   A1C:   Recent Labs   Lab 11/10/18  0016   HGBA1C 6.0*     Blood Culture:   Recent Labs   Lab 11/11/18 2003   LABBLOO No Growth to date     BMP:   Recent Labs   Lab 11/13/18  0610   *      K 3.7      CO2 27   BUN 18   CREATININE 1.0   CALCIUM 9.5   MG 1.8     CBC:   Recent Labs   Lab 11/12/18 0528 11/13/18  0610   WBC 6.19 6.14   HGB 13.9* 14.5   HCT 42.0 44.3   * 134*     CMP:   Recent Labs   Lab 11/12/18 0528 11/13/18  0610    140   K 3.6 3.7    103   CO2 25 27   * 133*   BUN 18 18   CREATININE 1.0 1.0   CALCIUM 9.3 9.5   ANIONGAP 10 10   EGFRNONAA >60 >60     Coagulation: No results for input(s): PT, INR, APTT in the last 48 hours.  Lactic Acid:   No results for input(s): LACTATE in the last 48 hours.  Magnesium:   Recent Labs   Lab 11/12/18 0528 11/13/18  0610   MG 1.6 1.8     Respiratory Culture: No results for input(s): GSRESP, RESPIRATORYC in the last 48 hours.  Urine Culture: No results for input(s): LABURIN in the last 48 hours.  Urine Studies:   No results for input(s): COLORU, APPEARANCEUA, PHUR, SPECGRAV, PROTEINUA, GLUCUA, KETONESU, BILIRUBINUA, OCCULTUA, NITRITE, UROBILINOGEN, LEUKOCYTESUR, RBCUA, WBCUA, BACTERIA, SQUAMEPITHEL, HYALINECASTS in the last 48 hours.    Invalid input(s): WRIGHTSUR    Significant Imaging: I have reviewed and interpreted all pertinent imaging results/findings within the past 24 hours.    Assessment/Plan:      * Community acquired pneumonia of right upper lobe of lung    -Admitted to inpatient telemetry status  -On admit had infiltrate  in RUL, leukocytosis and mild hypoxia but was afebrile without cough  -Remains afebrile and wbc quickly normalized.  -Flu is negative.    -Blood culture has grown Strep lutetiensis in two bottles.  This can be associated with endocarditis, poor dentition or colonic findings.  -Await sensitivities of strep.   -Repeat cultures negative so far  -TTE shows no evidence of endocarditis.  I spoke with patient's son, Jesus, and they would not wish to undergo invasive testing with ALYX.    -After speaking with ID yesterday and family today, I believe our plan will be to complete 4 weeks of antibiotics for presumed endocarditis.  -Provide tessalon perles, guaifenesin and albuterol nebulizers as needed  -Continue higher dose of rocephin for now  -Await final ID recs tomorrow and possibly home with HH tomorrow.     Streptococcal bacteremia    -Initial blood cultures positive for Strep lutetiensis   -He does not appear sick enough to have bacteremia, but it is growing in both cultures.  Repeats are negative  -This bacteria can be associated with endocarditis, poor dentition and colonic sources.  In this case could be due to his pneumonia, dentition or possible endocarditis.  -Repeat cultures negative so far.  -TTE without any valvular vegetations.  Family do not wish to pursue ALYX.  -Will need colonoscopy for completeness after discharge.  Will need to complete planned dental extractions.  -Appreciate input from Dr. Baumgarten.  Rocephin increased yesterday and azithromycin and micafungin discontinued by ID.  -At this point I believe plan will be to complete 4 weeks antibiotics, but await final recs from ID tomorrow.    -?home with  for IV antibiotics tomorrow?     Hypomagnesemia    -Mag normal today  -Repeat labs in AM.         Acute respiratory failure with hypoxia    -This is mild and without symptoms and actually may be due to poor connectivity between the pulseox and his skin  -Presently satting in the mid 90s on room air  with no discomfort  -Continue treatment of pneumonia   -Continue albuterol as needed  -Pulse ox q4h.  -He is DNR        Chronic idiopathic thrombocytopenia    -Platelets mildly low since at least 2015 and have increased today  -Stopped lovenox 11/10 after platelets fell below 100  -Monitor closely while in house       Spinal stenosis    -History noted       GERD (gastroesophageal reflux disease)    -Noted  -Continue home PPI       Benign prostatic hyperplasia    -History noted  -Continue flomax       HTN (hypertension)    -Continue home diltiazem  -Provide hydralazine if needed for SBP >180       DM (diabetes mellitus), type 2    -A1c is 6.0  -Hold sitagliptin for now  -Provide diabetic diet  -Treat with SSI ac/hs for now.       Hyperlipemia    -History noted  -Not on treatment at this time       Parkinson disease    -Noted and appears stable  -Will continue home pramipexol       A-fib    -History noted.  Previously was on anticoagulation but due to many falls and at least one subdural hematoma anticoagulation was discontinued and felt unsafe for him  -Monitor on tele  -Continue home diltiazem         VTE Risk Mitigation (From admission, onward)        Ordered     Place sequential compression device  Until discontinued      11/09/18 1727     IP VTE HIGH RISK PATIENT  Once      11/09/18 1727     Place YISEL hose  Until discontinued      11/09/18 1727              Silvano Mac MD  Department of Hospital Medicine   [facility], [place], operated by [facility]

## 2018-11-13 NOTE — PLAN OF CARE
Problem: Patient Care Overview  Goal: Plan of Care Review  Outcome: Ongoing (interventions implemented as appropriate)  PRN tx not required, no changes at this time.

## 2018-11-13 NOTE — PLAN OF CARE
Problem: Patient Care Overview  Goal: Plan of Care Review  Outcome: Ongoing (interventions implemented as appropriate)  No changes made at this time.

## 2018-11-13 NOTE — PLAN OF CARE
Problem: Patient Care Overview  Goal: Plan of Care Review  Outcome: Ongoing (interventions implemented as appropriate)  Plan of care reviewed with pt. Pt verbalized understanding. Hourly rounding performed. VSS on RA  No complaints of pain on this shift. Personal items and call bell within reach. Bed lowered and locked with bed alarm on and telesitter at bedside. Will continue to monitor.

## 2018-11-13 NOTE — ASSESSMENT & PLAN NOTE
-Initial blood cultures positive for Strep lutetiensis   -He does not appear sick enough to have bacteremia, but it is growing in both cultures.  Repeats are negative  -This bacteria can be associated with endocarditis, poor dentition and colonic sources.  In this case could be due to his pneumonia, dentition or possible endocarditis.  -Repeat cultures negative so far.  -TTE without any valvular vegetations.  Family do not wish to pursue ALYX.  -Will need colonoscopy for completeness after discharge.  Will need to complete planned dental extractions.  -Appreciate input from Dr. Baumgarten.  Rocephin increased yesterday and azithromycin and micafungin discontinued by ID.  -At this point I believe plan will be to complete 4 weeks antibiotics, but await final recs from ID tomorrow.    -?home with HH for IV antibiotics tomorrow?

## 2018-11-13 NOTE — PT/OT/SLP PROGRESS
Physical Therapy Treatment    Patient Name:  Silvano Parmar   MRN:  [id number]    Recommendations:     Discharge Recommendations:  home with home health, home health PT, home health OT   Discharge Equipment Recommendations: 3-in-1 commode   Barriers to discharge: Decreased caregiver support (pt will need 24/7 assistance/supervision upon discharge)    Assessment:     Silvano Parmar is a 88 y.o. male admitted with a medical diagnosis of Community acquired pneumonia of right upper lobe of lung.  He presents with the following impairments/functional limitations:  weakness, impaired endurance, impaired self care skills, impaired functional mobilty, gait instability, impaired balance, decreased safety awareness, impaired cardiopulmonary response to activity, edema ; pt with good mobility today, though still req's [place] for t/f's and CGA/min.A for amb..    Rehab Prognosis:  good; patient would benefit from acute skilled PT services to address these deficits and reach maximum level of function.      Recent Surgery: * No surgery found *      Plan:     During this hospitalization, patient to be seen 6 x/week to address the above listed problems via gait training, therapeutic activities, therapeutic exercises  · Plan of Care Expires:  12/10/18   Plan of Care Reviewed with: patient    Subjective     Communicated with nurse prior to session.  Patient found supine upon PT entry to room, agreeable to treatment.      Chief Complaint: Bilat hip pain with sit to stand t/f's mostly  Patient comments/goals: pt agreeable to session, does not report hip pain with amb.  Pain/Comfort:  · Pain Rating 1: (did not rate)  · Location - Side 1: Bilateral  · Location - Orientation 1: generalized  · Location 1: hip(with sit to stand t/f's only)  · Pain Addressed 1: Reposition, Distraction    Patients cultural, spiritual, Cheondoism conflicts given the current situation: none stated    Objective:     Patient found with: telemetry, SCD, bed  alarm, Condom Catheter, oxygen(O2@3L; and [employer] monitoring system present)     General Precautions: Standard, fall   Orthopedic Precautions:N/A   Braces: N/A     Functional Mobility:  · Bed Mobility:     · Supine to Sit: contact guard assistance and minimum assistance  · Transfers:     · Sit to Stand:  minimum assistance with rolling walker  · Gait: amb'd 100' with RW and CGA/min.A, cueing for upright posture, O2@3L:92-97%      AM-PAC 6 CLICK MOBILITY  Turning over in bed (including adjusting bedclothes, sheets and blankets)?: 3  Sitting down on and standing up from a chair with arms (e.g., wheelchair, bedside commode, etc.): 3  Moving from lying on back to sitting on the side of the bed?: 3  Moving to and from a bed to a chair (including a wheelchair)?: 3  Need to walk in hospital room?: 3  Climbing 3-5 steps with a railing?: 3  Basic Mobility Total Score: 18       Therapeutic Activities and Exercises:   pt perf'd seated LE ex's of heel/toe raises, hip flex, LAQ's x 10 ea.     Patient left up in chair with all lines intact, call button in reach, chair alarm on, nurse notified and [employer] monitoring system  present..    GOALS:   Multidisciplinary Problems     Physical Therapy Goals        Problem: Physical Therapy Goal    Goal Priority Disciplines Outcome Goal Variances Interventions   Physical Therapy Goal     PT, PT/OT      Description:  Goals to be met by 11-17-18.  1. Sup<>sit mod I  2. Sit<>stand with RW mod I  3. amb 150' with RW mod I                      Time Tracking:     PT Received On: 11/13/18  PT Start Time: 1027     PT Stop Time: 1053  PT Total Time (min): 26 min     Billable Minutes: Gait Training 16 and Therapeutic Exercise 10    Treatment Type: Treatment  PT/PTA: PTA     PTA Visit Number: 2     Constance Damian PTA  11/13/2018

## 2018-11-13 NOTE — PLAN OF CARE
Problem: Patient Care Overview  Goal: Plan of Care Review  Outcome: Ongoing (interventions implemented as appropriate)  Patient is with O2 via NC at 2 LPM, sleeping comfortably in bed. SCD's ON, AVASYS camera ON. Head of bed elevated. Changed dressing on PICC, removed gauze and placed new biopatch. Patient on tele monitor, paced rhythm. Needs attended, meds given. Vitals WNL. Safety maintained. Call light placed within reach. Condom cath in place and draining well.

## 2018-11-13 NOTE — PLAN OF CARE
Problem: Physical Therapy Goal  Goal: Physical Therapy Goal  Goals to be met by 11-17-18.  1. Sup<>sit mod I  2. Sit<>stand with RW mod I  3. amb 150' with RW mod I     Pt progressing towards goals, sup to sit to stand CGA/Lobito, amb'd 100' with RW and CGA/Lobito, O2@3L:92-97%. Recommend HHPT and 24/7 assistance/supervision upon discharge.

## 2018-11-13 NOTE — PT/OT/SLP PROGRESS
Occupational Therapy   Treatment    Name: Silvano Parmar  MRN: [id number]  Admitting Diagnosis:  Community acquired pneumonia of right upper lobe of lung       Recommendations:     Discharge Recommendations: nursing facility, skilled  Discharge Equipment Recommendations:  3-in-1 commode  Barriers to discharge:  Decreased caregiver support    Subjective     Communicated with: patient and his nurse prior to session.  He was easily awakened from sleep, actively agreeable to OT treatment    Pain/Comfort:  · Pain Rating 1: 0/10  · Pain Rating Post-Intervention 1: 0/10    Patients cultural, spiritual, Protestant conflicts given the current situation: none    Objective:     Patient found with: telemetry, SCD, bed alarm, Condom Catheter, oxygen    General Precautions: Standard, fall   Orthopedic Precautions:N/A   Braces: N/A     Occupational Performance:    Bed Mobility:          Mod A roll to Left -assist for problem solving task  · Mod A supine>sit EOB  · SBA sit>supine     Functional Mobility/Transfers:       CGA sit><stand with RW       CGA chair transfer with VC for hand placement with RW       CGA chair>bed transfer with RW         Ambulated bed>sink>door>chair with RW CGA with occasional VC for RW placement    Activities of Daily Living:  · Min A G/H (wash hands) standing at sink with RW  · Max A UBD (ff/don hospital gown as robe)-extreme difficulty figuring out how to perform task even with help  · Mod A LBD (doff/don socks) seated in chair    Patient left left sidelying with call button in reach, bed alarm on and nurse notified    AM PAC 6 Click:  AM PAC Total Score: 12    Treatment & Education:  UE BUE endurance exercises (10 sets of 10 reps each with 3 short rest breaks) seated in chair  Education:    Assessment:     Silvano Parmar is a 88 y.o. male with a medical diagnosis of Community acquired pneumonia of right upper lobe of lung.  He presents with  Performance deficits affecting function are impaired  endurance, gait instability, impaired functional mobilty, impaired cognition, decreased safety awareness, decreased lower extremity function, decreased upper extremity function, impaired balance, impaired self care skills.  He was Mod A supine>sit/SBA sit>supine, CGA sit><stand and chair transfers and ambulation with RW.  He was Max A UBD, Mod A LBD, Min A G/H standing at sink with UE endurance exercises included in the session.  Continuation of OT treatment is needed to maximize function while in the hospital.    Rehab Prognosis:  fair; patient would benefit from acute skilled OT services to address these deficits and reach maximum level of function.       Plan:     Patient to be seen 5 x/week to address the above listed problems via self-care/home management, therapeutic activities, therapeutic exercises  · Plan of Care Expires: 12/09/18  · Plan of Care Reviewed with: patient    This Plan of care has been discussed with the patient who was involved in its development and understands and is in agreement with the identified goals and treatment plan    GOALS:   Multidisciplinary Problems     Occupational Therapy Goals        Problem: Occupational Therapy Goal    Goal Priority Disciplines Outcome Interventions   Occupational Therapy Goal     OT, PT/OT Ongoing (interventions implemented as appropriate)    Description:  Goals to be met by 12/10/18  1. Assess self-feeding  2. SBA G/H seated EOB  3. Mod A UBD seated EOB or chair  4. Assess bed-chair transfer MET 11/13/18                    Time Tracking:     OT Date of Treatment: 11/13/18  OT Start Time: 1519  OT Stop Time: 1558  OT Total Time (min): 39 min    Billable Minutes:Self Care/Home Management 25  Therapeutic Exercise 14    NELLY Carias  11/13/2018

## 2018-11-13 NOTE — ASSESSMENT & PLAN NOTE
-Admitted to inpatient telemetry status  -On admit had infiltrate in RUL, leukocytosis and mild hypoxia but was afebrile without cough  -Remains afebrile and wbc quickly normalized.  -Flu is negative.    -Blood culture has grown Strep lutetiensis in two bottles.  This can be associated with endocarditis, poor dentition or colonic findings.  -Await sensitivities of strep.   -Repeat cultures negative so far  -TTE shows no evidence of endocarditis.  I spoke with patient's son, Jesus, and they would not wish to undergo invasive testing with ALYX.    -After speaking with ID yesterday and family today, I believe our plan will be to complete 4 weeks of antibiotics for presumed endocarditis.  -Provide tessalon perles, guaifenesin and albuterol nebulizers as needed  -Continue higher dose of rocephin for now  -Await final ID recs tomorrow and possibly home with  tomorrow.

## 2018-11-13 NOTE — PLAN OF CARE
Problem: Occupational Therapy Goal  Goal: Occupational Therapy Goal  Goals to be met by 12/10/18  1. Assess self-feeding  2. SBA G/H seated EOB  3. Mod A UBD seated EOB or chair  4. Assess bed-chair transfer MET 11/13/18  Outcome: Ongoing (interventions implemented as appropriate)  Pt continues to need extra assist and CGA for safety and compensation for poor memory and problem solving skills.  He was Mod A supine>sit/SBA sit>supine, CGA sit><stand and chair transfers and ambulation with RW.  He was Max A UBD, Mod A LBD, Min A G/H standing at sink with UE endurance exercises included in the session.  NELLY Carias 11/13/2018

## 2018-11-13 NOTE — PHARMACY MED REC
"Admission Medication Reconciliation - Pharmacy Consult Note    The home medication history was taken by Aster Cloud CPhT.  Based on information gathered and subsequent review by the clinical pharmacist, the items below may need attention.    You may go to "Admission" then "Reconcile Home Medications" tabs to review and/or act upon these items.    No issues noted with the medication reconciliation.    Medications Prior to Admission   Medication Sig Dispense Refill Last Dose    aspirin (ECOTRIN) 325 MG EC tablet Take 1 tablet (325 mg total) by mouth once daily. 30 tablet 0 11/9/2018    carbidopa-levodopa  mg (SINEMET)  mg per tablet Take 2 tablets by mouth 3 (three) times daily.    11/9/2018    cholecalciferol, vitamin D3, (VITAMIN D3) 4,000 unit Cap Take 1 capsule by mouth once daily.    11/9/2018    diltiaZEM (CARDIZEM CD) 120 MG Cp24 TAKE ONE CAPSULE BY MOUTH ONCE DAILY 90 capsule 3     esomeprazole (NEXIUM) 40 MG capsule Take 22.3 mg by mouth before breakfast.    11/9/2018    fluticasone (FLONASE) 50 mcg/actuation nasal spray 1 spray (50 mcg total) by Each Nare route once daily. 16 g 11     FOLIC ACID/MULTIVITS-MIN/LUT (CENTRUM SILVER ORAL) Take 1 tablet by mouth once daily.       furosemide (LASIX) 20 MG tablet TAKE 1 TABLET BY MOUTH EVERY DAY 90 tablet 0 11/9/2018    gabapentin (NEURONTIN) 300 MG capsule Take 1 capsule (300 mg total) by mouth 2 (two) times daily. 60 capsule 6 11/9/2018    GENERLAC 10 gram/15 mL solution Take 10 g by mouth daily as needed.        guaifenesin 100 mg/5 ml (ROBITUSSIN) 100 mg/5 mL syrup Take 200 mg by mouth every 4 (four) hours as needed for Cough.       losartan (COZAAR) 50 MG tablet TAKE 1 TABLET(50 MG) BY MOUTH EVERY DAY 30 tablet 6     meloxicam (MOBIC) 15 MG tablet TAKE 1 TABLET(15 MG) BY MOUTH EVERY DAY FOR BACK OR LEG PAIN 30 tablet 0 11/9/2018    pramipexole (MIRAPEX) 1 MG tablet Take 1 tablet by mouth 3 (three) times daily.   11/9/2018    " SITagliptan (JANUVIA) 50 MG Tab Take 1 tablet (50 mg total) by mouth once daily. 90 tablet 3 11/9/2018    tamsulosin (FLOMAX) 0.4 mg Cp24 Take 1 capsule (0.4 mg total) by mouth once daily. 90 capsule 3     MUCINEX DM  mg per 12 hr tablet TK UTD  0 Unknown     Please address this information as you see fit.  Feel free to contact us if you have any questions or require assistance.    Trenton Gaona, Pharm.D., BCPS  [phone]                .  .

## 2018-11-14 VITALS
OXYGEN SATURATION: 94 % | DIASTOLIC BLOOD PRESSURE: 85 MMHG | RESPIRATION RATE: 16 BRPM | BODY MASS INDEX: 29.12 KG/M2 | TEMPERATURE: 98 F | HEART RATE: 69 BPM | WEIGHT: 215 LBS | HEIGHT: 72 IN | SYSTOLIC BLOOD PRESSURE: 151 MMHG

## 2018-11-14 LAB
ANION GAP SERPL CALC-SCNC: 10 MMOL/L
BASOPHILS # BLD AUTO: 0.03 K/UL
BASOPHILS NFR BLD: 0.5 %
BUN SERPL-MCNC: 19 MG/DL
CALCIUM SERPL-MCNC: 9.3 MG/DL
CHLORIDE SERPL-SCNC: 102 MMOL/L
CO2 SERPL-SCNC: 28 MMOL/L
CREAT SERPL-MCNC: 1.1 MG/DL
DIFFERENTIAL METHOD: ABNORMAL
ENTEROVIRUS: NOT DETECTED
EOSINOPHIL # BLD AUTO: 0.3 K/UL
EOSINOPHIL NFR BLD: 5.2 %
ERYTHROCYTE [DISTWIDTH] IN BLOOD BY AUTOMATED COUNT: 13.4 %
EST. GFR  (AFRICAN AMERICAN): >60 ML/MIN/1.73 M^2
EST. GFR  (NON AFRICAN AMERICAN): 60 ML/MIN/1.73 M^2
GLUCOSE SERPL-MCNC: 154 MG/DL
HCT VFR BLD AUTO: 42.2 %
HGB BLD-MCNC: 14 G/DL
HUMAN BOCAVIRUS: NOT DETECTED
HUMAN CORONAVIRUS, COMMON COLD VIRUS: NOT DETECTED
INFLUENZA A - H1N1-09: NOT DETECTED
L PNEUMO AG UR QL IA: NOT DETECTED
LYMPHOCYTES # BLD AUTO: 1 K/UL
LYMPHOCYTES NFR BLD: 15 %
MAGNESIUM SERPL-MCNC: 1.8 MG/DL
MCH RBC QN AUTO: 32.2 PG
MCHC RBC AUTO-ENTMCNC: 33.2 G/DL
MCV RBC AUTO: 97 FL
MONOCYTES # BLD AUTO: 0.7 K/UL
MONOCYTES NFR BLD: 10.6 %
NEUTROPHILS # BLD AUTO: 4.5 K/UL
NEUTROPHILS NFR BLD: 68.2 %
NRBC BLD-RTO: 0 /100 WBC
PARAINFLUENZA: NOT DETECTED
PLATELET # BLD AUTO: 142 K/UL
PMV BLD AUTO: 11.8 FL
POCT GLUCOSE: 124 MG/DL (ref 70–110)
POCT GLUCOSE: 133 MG/DL (ref 70–110)
POCT GLUCOSE: 138 MG/DL (ref 70–110)
POTASSIUM SERPL-SCNC: 3.5 MMOL/L
RBC # BLD AUTO: 4.35 M/UL
RVP - ADENOVIRUS: NOT DETECTED
RVP - HUMAN METAPNEUMOVIRUS (HMPV): NOT DETECTED
RVP - INFLUENZA A: NOT DETECTED
RVP - INFLUENZA B: NOT DETECTED
RVP - RESPIRATORY SYNCTIAL VIRUS (RSV) A: NOT DETECTED
RVP - RESPIRATORY VIRAL PANEL, SOURCE: NORMAL
RVP - RHINOVIRUS: NOT DETECTED
SODIUM SERPL-SCNC: 140 MMOL/L
WBC # BLD AUTO: 6.6 K/UL

## 2018-11-14 PROCEDURE — 85025 COMPLETE CBC W/AUTO DIFF WBC: CPT

## 2018-11-14 PROCEDURE — 94640 AIRWAY INHALATION TREATMENT: CPT

## 2018-11-14 PROCEDURE — G8979 MOBILITY GOAL STATUS: HCPCS | Mod: CI

## 2018-11-14 PROCEDURE — 99900035 HC TECH TIME PER 15 MIN (STAT)

## 2018-11-14 PROCEDURE — 97110 THERAPEUTIC EXERCISES: CPT

## 2018-11-14 PROCEDURE — 83735 ASSAY OF MAGNESIUM: CPT

## 2018-11-14 PROCEDURE — 80048 BASIC METABOLIC PNL TOTAL CA: CPT

## 2018-11-14 PROCEDURE — G8980 MOBILITY D/C STATUS: HCPCS | Mod: CK

## 2018-11-14 PROCEDURE — 63600175 PHARM REV CODE 636 W HCPCS: Performed by: INTERNAL MEDICINE

## 2018-11-14 PROCEDURE — 25000003 PHARM REV CODE 250: Performed by: HOSPITALIST

## 2018-11-14 PROCEDURE — 36415 COLL VENOUS BLD VENIPUNCTURE: CPT

## 2018-11-14 PROCEDURE — 99233 SBSQ HOSP IP/OBS HIGH 50: CPT | Mod: ,,, | Performed by: INTERNAL MEDICINE

## 2018-11-14 PROCEDURE — 97530 THERAPEUTIC ACTIVITIES: CPT

## 2018-11-14 PROCEDURE — 25000242 PHARM REV CODE 250 ALT 637 W/ HCPCS: Performed by: HOSPITALIST

## 2018-11-14 PROCEDURE — 99239 HOSP IP/OBS DSCHRG MGMT >30: CPT | Mod: ,,, | Performed by: HOSPITALIST

## 2018-11-14 PROCEDURE — 97535 SELF CARE MNGMENT TRAINING: CPT

## 2018-11-14 PROCEDURE — 94761 N-INVAS EAR/PLS OXIMETRY MLT: CPT

## 2018-11-14 RX ORDER — ACETAMINOPHEN 325 MG/1
650 TABLET ORAL EVERY 4 HOURS PRN
Refills: 0 | Status: ON HOLD | COMMUNITY
Start: 2018-11-14 | End: 2019-04-09 | Stop reason: CLARIF

## 2018-11-14 RX ORDER — BENZONATATE 100 MG/1
100 CAPSULE ORAL 3 TIMES DAILY PRN
Start: 2018-11-14 | End: 2018-11-24

## 2018-11-14 RX ADMIN — LOSARTAN POTASSIUM 50 MG: 50 TABLET, FILM COATED ORAL at 09:11

## 2018-11-14 RX ADMIN — MULTIPLE VITAMINS W/ MINERALS TAB: TAB at 09:11

## 2018-11-14 RX ADMIN — GUAIFENESIN 600 MG: 600 TABLET, EXTENDED RELEASE ORAL at 09:11

## 2018-11-14 RX ADMIN — TAMSULOSIN HYDROCHLORIDE 0.4 MG: 0.4 CAPSULE ORAL at 09:11

## 2018-11-14 RX ADMIN — GABAPENTIN 300 MG: 300 CAPSULE ORAL at 09:11

## 2018-11-14 RX ADMIN — CARBIDOPA AND LEVODOPA 2 TABLET: 25; 100 TABLET ORAL at 09:11

## 2018-11-14 RX ADMIN — CARBIDOPA AND LEVODOPA 2 TABLET: 25; 100 TABLET ORAL at 02:11

## 2018-11-14 RX ADMIN — DILTIAZEM HYDROCHLORIDE 120 MG: 120 CAPSULE, COATED, EXTENDED RELEASE ORAL at 09:11

## 2018-11-14 RX ADMIN — CEFTRIAXONE 1 G: 1 INJECTION, SOLUTION INTRAVENOUS at 01:11

## 2018-11-14 RX ADMIN — ALBUTEROL SULFATE 2.5 MG: 2.5 SOLUTION RESPIRATORY (INHALATION) at 02:11

## 2018-11-14 RX ADMIN — ASPIRIN 325 MG: 325 TABLET, DELAYED RELEASE ORAL at 09:11

## 2018-11-14 RX ADMIN — PANTOPRAZOLE SODIUM 40 MG: 40 TABLET, DELAYED RELEASE ORAL at 09:11

## 2018-11-14 RX ADMIN — CEFTRIAXONE 1 G: 1 INJECTION, SOLUTION INTRAVENOUS at 02:11

## 2018-11-14 RX ADMIN — FUROSEMIDE 20 MG: 20 TABLET ORAL at 09:11

## 2018-11-14 RX ADMIN — PRAMIPEXOLE DIHYDROCHLORIDE 1 MG: 0.5 TABLET ORAL at 02:11

## 2018-11-14 RX ADMIN — FLUTICASONE PROPIONATE 50 MCG: 50 SPRAY, METERED NASAL at 09:11

## 2018-11-14 RX ADMIN — PRAMIPEXOLE DIHYDROCHLORIDE 1 MG: 0.5 TABLET ORAL at 09:11

## 2018-11-14 NOTE — PT/OT/SLP PROGRESS
"Occupational Therapy   Treatment    Name: Silvano Parmar  MRN: [id number]  Admitting Diagnosis:  Community acquired pneumonia of right upper lobe of lung       Recommendations:     Discharge Recommendations: nursing facility, skilled  Discharge Equipment Recommendations:  other (see comments)(TBD at next level of care, but anticipate 3-in-1)  Barriers to discharge:  Decreased caregiver support    Subjective     Communicated with: YAQUELIN Cruz prior to session.  Pain/Comfort:  · Pain Rating 1: other (see comments)(pt stated "pain in hips" with movement, but unable to quantify or answer in (B) or one side)  · Pain Addressed 1: Reposition, Distraction, Cessation of Activity  · Pain Rating Post-Intervention 1: 0/10(at rest)    Patients cultural, spiritual, Yazdanism conflicts given the current situation: None per chart review    Objective:     Patient found with: SCD, telemetry, bed alarm, Condom Catheter, oxygen(telesitter)    General Precautions: Standard, aspiration, diabetic, fall, other (see comments)(CHAIR ALARM)   Orthopedic Precautions:N/A   Braces: N/A     Occupational Performance:    Bed Mobility:    · Patient completed Rolling/Turning to Left with  minimum assistance and with side rail  · Patient completed Scooting/Bridging with contact guard assistance  · Patient completed Supine to Sit with contact guard assistance and with side rail     Functional Mobility/Transfers:  · Patient completed Sit <> Stand Transfer with minimum assistance  with  rolling walker   · Patient completed Bed <> Chair Transfer using Step Transfer technique with minimum assistance with rolling walker  · Functional Mobility: Min Assist with RW for sequencing    Activities of Daily Living:  · Grooming: minimum assistance seated UIC  · Upper Body Dressing: moderate assistance for donning gown seated at EOB    Patient left up in chair with all lines intact, call button in reach, chair alarm on and RN notified    [facility] 6 Click:  [facility] Total " Score: 12    Treatment & Education:  Educated patient on rolling/sup to sit technique, hand placement for all sit to/from stand transfers with RW.  All require reinforcement.  Education:    Assessment:     Silvano Parmar is a 88 y.o. male with a medical diagnosis of Community acquired pneumonia of right upper lobe of lung.  He presents with the following performance deficits affecting function: weakness, impaired endurance, impaired self care skills, impaired functional mobilty, gait instability, impaired balance, impaired cognition, decreased upper extremity function, decreased lower extremity function, decreased safety awareness, pain, impaired cardiopulmonary response to activity, edema.      Continues with confusion, assist with problem solving for all tasks (bed mobility, hand placement for sit to/from stand, UBD, grooming).  Continue OT services to maximize patient functioning.    Rehab Prognosis:  Good; patient would benefit from acute skilled OT services to address these deficits and reach maximum level of function.       Plan:     Patient to be seen 5 x/week to address the above listed problems via self-care/home management, therapeutic activities, therapeutic exercises  · Plan of Care Expires: 12/09/18  · Plan of Care Reviewed with: patient    This Plan of care has been discussed with the patient who was involved in its development and understands and is in agreement with the identified goals and treatment plan    GOALS:   Multidisciplinary Problems     Occupational Therapy Goals        Problem: Occupational Therapy Goal    Goal Priority Disciplines Outcome Interventions   Occupational Therapy Goal     OT, PT/OT Ongoing (interventions implemented as appropriate)    Description:  Goals to be met by 12/10/18  1. Assess self-feeding  2. SBA G/H seated EOB  3. Mod A UBD seated EOB or chair (MET 11/14/18)  REVISED Min A UBD seated at EOB or UIC  4. Assess bed-chair transfer MET 11/13/18   REVISED CGA for  for all functional transfers with RW                    Time Tracking:     OT Date of Treatment: 11/14/18  OT Start Time: 0936  OT Stop Time: 1005  OT Total Time (min): 29 min    Billable Minutes:Self Care/Home Management 29    NELLY Ochoa  11/14/2018

## 2018-11-14 NOTE — DISCHARGE SUMMARY
[facility] Hospital Medicine  Discharge Summary      Patient Name: Silvano Parmar  MRN: [id number]  Admission Date: 11/9/2018  Hospital Length of Stay: 5 days  Discharge Date and Time:  11/14/2018 10:25 AM  Attending Physician: Silvano Mac MD   Discharging Provider: Silvano Mac MD  Primary Care Provider: Silvestre Ramirez MD      HPI:   Mr. Parmar is an 88 year old man with a history of parkinsons disease, atrial fibrillation, diabetes, spinal stenosis and frequent falls who was found on the floor by his bed this morning at 6:30AM by the staff at [facility] where he resides.  He states he simply slid out of bed to the floor when getting out of be and was unable to get up off the floor.  He denied any injury.  Unfortunately, he was found on the floor again two other times today.  He has no recollection of how those occurred.  His son is at the bedside and informs me he has a long history of falls, and at one point suffered a small subdural hematoma.  In general Mr. Parmar ambulates with a walker.  His only complaint at this time is that he has a bit of an increase in right hip discomfort and right thorax discomfort.  He denies knowledge of having fallen on these areas today.He denies any fevers, cough, shortness of breath, loss of consciousness, headache, neck stiffness, palpitations or new weakness.      In the ER he was found to have a right upper lobe infiltrate on chest xray, a leukocytosis and mild asymptomatic hypoxia and is being admitted for treatment of community acquired pneumonia.    * No surgery found *      Hospital Course:   Mr. Parmar is an 88 year old man with a history of parkinsons disease, atrial fibrillation, diabetes, spinal stenosis and frequent falls who was admitted and found to have bacteremia with Strep lutetiensis, pneumonia and acute hypoxic respiratory failure.  Source of bacteremia could be pneumonia, poor dentition or colonic.  He was admitted and  treated with IV antibiotics and he was seen by ID.  Echocardiogram revealed no evidence of valvular lesions, but given his age and frailty the family was not in favor of invasive ALYX.  Repeat blood cultures have remained negative so far.  After discussing with ID, the decision was made to pursue a full 4 week course of IV rocephin 1g bid, to treat for possible endocarditis.  He resides at [facility], but cannot get IV antibiotics there.  As such, we are discharging Mr. Parmar to SNF to complete his antibiotics.  These should end on 12/8/2018.  He will need weekly cbc and cmp during this time.  He is asked to follow up with his pcp within 1-2 weeks and ID within 2-4 weeks.     Consults:   Consults (From admission, onward)        Status Ordering Provider     Inpatient consult to Infectious Diseases  Once     Provider:  Jonathan Canas MD    Completed CALEB ZUNIGA     Inpatient consult to PICC team ([facility])  Once     Provider:  (Not yet assigned)    CHEKO Rollins          No new Assessment & Plan notes have been filed under this hospital service since the last note was generated.  Service: Hospital Medicine    Final Active Diagnoses:    Diagnosis Date Noted POA    PRINCIPAL PROBLEM:  Community acquired pneumonia of right upper lobe of lung [J18.1] 11/09/2018 Yes    Streptococcal bacteremia [R78.81, B95.5] 11/11/2018 Yes    Acute respiratory failure with hypoxia [J96.01] 11/09/2018 Yes    Hypomagnesemia [E83.42] 11/09/2018 Yes    Chronic idiopathic thrombocytopenia [D69.3] 11/09/2018 Yes    HTN (hypertension) [I10] 10/13/2014 Yes    Benign prostatic hyperplasia [N40.0] 10/13/2014 Yes    GERD (gastroesophageal reflux disease) [K21.9] 10/13/2014 Yes    Spinal stenosis [M48.00] 10/13/2014 Yes    DM (diabetes mellitus), type 2 [E11.9] 05/28/2013 Yes    A-fib [I48.91] 11/27/2012 Yes    Parkinson disease [G20] 11/27/2012 Yes    Hyperlipemia [E78.5] 11/27/2012 Yes      Problems Resolved  During this Admission:    Diagnosis Date Noted Date Resolved POA    Possible Gram-positive bacteremia [R78.81] 11/10/2018 11/11/2018 Yes       Discharged Condition: fair    Disposition: Skilled Nursing Facility    Follow Up:  Follow-up Information     Silvestre Ramirez MD In 2 weeks.    Specialty:  Internal Medicine  Contact information:  [address]  [facility] [address]  [phone]             Katherine K Baumgarten, MD In 2 weeks.    Specialty:  Infectious Diseases  Contact information:  [address]  [facility] [address]  [phone]                 Patient Instructions:      Ambulatory referral to Outpatient Case Management   Referral Priority: Routine Referral Type: Consultation   Referral Reason: Specialty Services Required   Number of Visits Requested: 1     Diet Cardiac     Diet diabetic     Notify your health care provider if you experience any of the following:  increased confusion or weakness     Notify your health care provider if you experience any of the following:  persistent dizziness, light-headedness, or visual disturbances     Notify your health care provider if you experience any of the following:  worsening rash     Notify your health care provider if you experience any of the following:  severe persistent headache     Notify your health care provider if you experience any of the following:  difficulty breathing or increased cough     Notify your health care provider if you experience any of the following:  severe uncontrolled pain     Notify your health care provider if you experience any of the following:  persistent nausea and vomiting or diarrhea     Notify your health care provider if you experience any of the following:  temperature >100.4     Activity as tolerated       Significant Diagnostic Studies: Labs:   BMP:   Recent Labs   Lab 11/13/18  0610 11/14/18  0541   * 154*    140   K 3.7 3.5    102   CO2 27 28   BUN 18 19   CREATININE 1.0 1.1    CALCIUM 9.5 9.3   MG 1.8 1.8   , CMP   Recent Labs   Lab 11/13/18  0610 11/14/18  0541    140   K 3.7 3.5    102   CO2 27 28   * 154*   BUN 18 19   CREATININE 1.0 1.1   CALCIUM 9.5 9.3   ANIONGAP 10 10   ESTGFRAFRICA >60 >60   EGFRNONAA >60 60   , CBC   Recent Labs   Lab 11/13/18  0610 11/14/18  0541   WBC 6.14 6.60   HGB 14.5 14.0   HCT 44.3 42.2   * 142*    and INR No results found for: INR, PROTIME    Pending Diagnostic Studies:     Procedure Component Value Units Date/Time    Legionella antigen, urine [[id number]] Collected:  11/09/18 2053    Order Status:  Sent Lab Status:  In process Updated:  11/09/18 2348    Specimen:  Urine, Clean Catch          Medications:  Reconciled Home Medications:      Medication List      START taking these medications    acetaminophen 325 MG tablet  Commonly known as:  TYLENOL  Take 2 tablets (650 mg total) by mouth every 4 (four) hours as needed.     benzonatate 100 MG capsule  Commonly known as:  TESSALON  Take 1 capsule (100 mg total) by mouth 3 (three) times daily as needed for Cough.     cefTRIAXone 1 g in dextrose 5 % 50 mL 1 g/50 mL Pgbk IVPB  Commonly known as:  ROCEPHIN  Inject 50 mLs (1 g total) into the vein every 12 (twelve) hours. for 24 days        CONTINUE taking these medications    aspirin 325 MG EC tablet  Commonly known as:  ECOTRIN  Take 1 tablet (325 mg total) by mouth once daily.     carbidopa-levodopa  mg  mg per tablet  Commonly known as:  SINEMET  Take 2 tablets by mouth 3 (three) times daily.     CENTRUM SILVER ORAL  Take 1 tablet by mouth once daily.     diltiaZEM 120 MG Cp24  Commonly known as:  CARDIZEM CD  TAKE ONE CAPSULE BY MOUTH ONCE DAILY     esomeprazole 40 MG capsule  Commonly known as:  NEXIUM  Take 22.3 mg by mouth before breakfast.     fluticasone 50 mcg/actuation nasal spray  Commonly known as:  FLONASE  1 spray (50 mcg total) by Each Nare route once daily.     furosemide 20 MG tablet  Commonly known as:   LASIX  TAKE 1 TABLET BY MOUTH EVERY DAY     gabapentin 300 MG capsule  Commonly known as:  NEURONTIN  Take 1 capsule (300 mg total) by mouth 2 (two) times daily.     GENERLAC 10 gram/15 mL solution  Generic drug:  lactulose  Take 10 g by mouth daily as needed.     guaifenesin 100 mg/5 ml 100 mg/5 mL syrup  Commonly known as:  ROBITUSSIN  Take 200 mg by mouth every 4 (four) hours as needed for Cough.     losartan 50 MG tablet  Commonly known as:  COZAAR  TAKE 1 TABLET(50 MG) BY MOUTH EVERY DAY     pramipexole 1 MG tablet  Commonly known as:  MIRAPEX  Take 1 tablet by mouth 3 (three) times daily.     SITagliptin 50 MG Tab  Commonly known as:  JANUVIA  Take 1 tablet (50 mg total) by mouth once daily.     tamsulosin 0.4 mg Cap  Commonly known as:  FLOMAX  Take 1 capsule (0.4 mg total) by mouth once daily.     VITAMIN D3 4,000 unit Cap  Generic drug:  cholecalciferol (vitamin D3)  Take 1 capsule by mouth once daily.        STOP taking these medications    meloxicam 15 MG tablet  Commonly known as:  MOBIC     MUCINEX DM  mg per 12 hr tablet  Generic drug:  dextromethorphan-guaifenesin  mg            Indwelling Lines/Drains at time of discharge:   Lines/Drains/Airways     Drain            Male External Urinary Catheter 11/09/18 2000 4 days                Time spent on the discharge of patient: 35 minutes  Patient was seen and examined on the date of discharge and determined to be suitable for discharge.         Silvano Mac MD  Department of Hospital Medicine  [facility]

## 2018-11-14 NOTE — PT/OT/SLP PROGRESS
Physical Therapy Treatment    Patient Name:  Silvano Parmar   MRN:  [id number]    Recommendations:     Discharge Recommendations:  nursing facility, skilled   Discharge Equipment Recommendations: (defer to SNF )   Barriers to discharge: return to [facility]     Assessment:     Silvano Parmar is a 88 y.o. male admitted with a medical diagnosis of Community acquired pneumonia of right upper lobe of lung.  He presents with the following impairments/functional limitations:  weakness, impaired endurance, impaired self care skills, impaired balance, gait instability, impaired functional mobilty, impaired cognition, decreased safety awareness, edema  Patient tolerated treatment session fairly well but still present with poor safety awareness and constant verbal cues throughout.     Rehab Prognosis:  good; patient would benefit from acute skilled PT services to address these deficits and reach maximum level of function.      Recent Surgery: * No surgery found *      Plan:     During this hospitalization, patient to be seen 6 x/week to address the above listed problems via gait training, therapeutic activities, therapeutic exercises  · Plan of Care Expires:  12/10/18   Plan of Care Reviewed with: patient    Subjective     Communicated with nurse prior to session.  Patient found supine upon PT entry to room, agreeable to treatment.  Charge nurse came in and removed bag catheter and ordered a brief from downstairs.     Chief Complaint: patient complained of being hungry   Patient comments/goals: none  Pain/Comfort:  · Pain Rating 1: 0/10  · Pain Rating Post-Intervention 1: 0/10    Patients cultural, spiritual, Buddhism conflicts given the current situation: none stated    Objective:     Patient found with: PICC line, telemetry, oxygen(3L)     General Precautions: Standard, aspiration, diabetic, fall(chair alarm)   Orthopedic Precautions:N/A   Braces: N/A     Functional Mobility:  · Supine to sit with Min A at  trunk  · Min A to scoot to edge of bed   · Sit to stand from bed with Rolling walker with Min A for safety and required verbal cues for hand placement   · SPT from bed to bedside chair with Rolling walker with CGA for safety   · Patient gait trained 30 feet in room (due to not having a brief or condom catheter) with Rolling walker with CGA/Min A for safety and balance. Patient demo slow pace, decrease step length.       AM-PAC 6 CLICK MOBILITY  Turning over in bed (including adjusting bedclothes, sheets and blankets)?: 3  Sitting down on and standing up from a chair with arms (e.g., wheelchair, bedside commode, etc.): 3  Moving from lying on back to sitting on the side of the bed?: 3  Need to walk in hospital room?: 3  Climbing 3-5 steps with a railing?: 2       Therapeutic Activities and Exercises:  Patient required assistance for cleaning and then used blue pad between B LE's. Brief still had not come so patient was placed in bedside chair to eat lunch. Assisted patient with set up. Patient performed AP, LAQ, Hip Flexion, Hip Abd X 15 reps AAROM     Patient left up in chair with all lines intact, call button in reach, chair alarm on, PCT notified and son present..    GOALS:   Multidisciplinary Problems     Physical Therapy Goals        Problem: Physical Therapy Goal    Goal Priority Disciplines Outcome Goal Variances Interventions   Physical Therapy Goal     PT, PT/OT Ongoing (interventions implemented as appropriate)     Description:  Goals to be met by 11-17-18.  1. Sup<>sit mod I  2. Sit<>stand with RW mod I  3. amb 150' with RW mod I                      Time Tracking:     PT Received On: 11/14/18  PT Start Time: 1215     PT Stop Time: 1241  PT Total Time (min): 26 min     Billable Minutes: Therapeutic Activity 14, TE 12    Treatment Type: Treatment  PT/PTA: PTA     PTA Visit Number: 3     Aishwarya Jon, ILYA  11/14/2018

## 2018-11-14 NOTE — PLAN OF CARE
[facility]   Department of Hospital Medicine  [address] [place] [address]  [phone] (phone)  [phone] (fax)      Facility Transfer Orders                        11/14/2018    Silvano Parmar    Admit to: SNF    Diagnoses:  Active Hospital Problems    Diagnosis  POA    *Community acquired pneumonia of right upper lobe of lung [J18.1]  Yes     Priority: 1 - High    Streptococcal bacteremia [R78.81, B95.5]  Yes     Priority: 2     Acute respiratory failure with hypoxia [J96.01]  Yes     Priority: 3     Hypomagnesemia [E83.42]  Yes     Priority: 3     Chronic idiopathic thrombocytopenia [D69.3]  Yes     Priority: 4     HTN (hypertension) [I10]  Yes    Benign prostatic hyperplasia [N40.0]  Yes    GERD (gastroesophageal reflux disease) [K21.9]  Yes    Spinal stenosis [M48.00]  Yes    DM (diabetes mellitus), type 2 [E11.9]  Yes    A-fib [I48.91]  Yes    Parkinson disease [G20]  Yes    Hyperlipemia [E78.5]  Yes      Resolved Hospital Problems    Diagnosis Date Resolved POA    Possible Gram-positive bacteremia [R78.81] 11/11/2018 Yes     Priority: 2        Allergies:  Review of patient's allergies indicates:   Allergen Reactions    Demerol [meperidine]        Vitals:     Every shift (Skilled Nursing patients)    Diet: cardiac diabetic  Supplement:  1 can every three times a day with meals                         Type:  Glucerna       Activity:      - Up in a chair each morning as tolerated   - Ambulate with assistance to bathroom    Nursing Precautions:     - Aspiration precautions:             -  Upright 90 degrees befor during and after meals             -  Suction at bedside          - Fall precautions    CONSULTS:      PT to evaluate and treat - five times a week   OT to evaluate and treat - five times a week   Nutrition to evaluate and recommend diet    LABS:  CBC, BMP and ESR weekly and fax to Dr. Katherine Baumgarten at [facility]      DIABETES CARE:      Check blood sugar:    Fingerstick blood sugar AC and HS      Report CBG < 60 or > 400 to physician.                                          Insulin Sliding Scale          Glucose  Novolog Insulin Subcutaneous        0 - 60   Orange juice or glucose tablet, hold insulin      No insulin   201-250  2 units   251-300  4 units   301-350  6 units   351-400  8 units   >400   10 units then call physician      Medications: Discontinue all previous medication orders, if any. See new list below.   Silvano Parmar   Home Medication Instructions LINA:[id number]    Printed on:11/14/18 6487   Medication Information                      acetaminophen (TYLENOL) 325 MG tablet  Take 2 tablets (650 mg total) by mouth every 4 (four) hours as needed.             aspirin (ECOTRIN) 325 MG EC tablet  Take 1 tablet (325 mg total) by mouth once daily.             benzonatate (TESSALON) 100 MG capsule  Take 1 capsule (100 mg total) by mouth 3 (three) times daily as needed for Cough.             carbidopa-levodopa  mg (SINEMET)  mg per tablet  Take 2 tablets by mouth 3 (three) times daily.              cefTRIAXone 1 g in dextrose 5 % 50 mL (ROCEPHIN) 1 g/50 mL PgBk IVPB  Inject 50 mLs (1 g total) into the vein every 12 (twelve) hours. for 24 days             cholecalciferol, vitamin D3, (VITAMIN D3) 4,000 unit Cap  Take 1 capsule by mouth once daily.              diltiaZEM (CARDIZEM CD) 120 MG Cp24  TAKE ONE CAPSULE BY MOUTH ONCE DAILY             esomeprazole (NEXIUM) 40 MG capsule  Take 22.3 mg by mouth before breakfast.              fluticasone (FLONASE) 50 mcg/actuation nasal spray  1 spray (50 mcg total) by Each Nare route once daily.             FOLIC ACID/MULTIVITS-MIN/LUT (CENTRUM SILVER ORAL)  Take 1 tablet by mouth once daily.             furosemide (LASIX) 20 MG tablet  TAKE 1 TABLET BY MOUTH EVERY DAY             gabapentin (NEURONTIN) 300 MG capsule  Take 1 capsule (300 mg  total) by mouth 2 (two) times daily.             GENERLAC 10 gram/15 mL solution  Take 10 g by mouth daily as needed.              guaifenesin 100 mg/5 ml (ROBITUSSIN) 100 mg/5 mL syrup  Take 200 mg by mouth every 4 (four) hours as needed for Cough.             losartan (COZAAR) 50 MG tablet  TAKE 1 TABLET(50 MG) BY MOUTH EVERY DAY             pramipexole (MIRAPEX) 1 MG tablet  Take 1 tablet by mouth 3 (three) times daily.             SITagliptan (JANUVIA) 50 MG Tab  Take 1 tablet (50 mg total) by mouth once daily.             tamsulosin (FLOMAX) 0.4 mg Cp24  Take 1 capsule (0.4 mg total) by mouth once daily.                 _________________________________  Silvano Mac MD  11/14/2018

## 2018-11-14 NOTE — HOSPITAL COURSE
Mr. Parmar is an 88 year old man with a history of parkinsons disease, atrial fibrillation, diabetes, spinal stenosis and frequent falls who was admitted and found to have bacteremia with Strep lutetiensis, pneumonia and acute hypoxic respiratory failure.  Source of bacteremia could be pneumonia, poor dentition or colonic.  He was admitted and treated with IV antibiotics and he was seen by ID.  Echocardiogram revealed no evidence of valvular lesions, but given his age and frailty the family was not in favor of invasive ALYX.  Repeat blood cultures have remained negative so far.  After discussing with ID, the decision was made to pursue a full 4 week course of IV rocephin 1g bid, to treat for possible endocarditis.  He resides at [facility], but cannot get IV antibiotics there.  As such, we are discharging Mr. Parmar to SNF to complete his antibiotics.  These should end on 12/8/2018.  He will need weekly cbc and cmp during this time.  He is asked to follow up with his pcp within 1-2 weeks and ID within 2-4 weeks.

## 2018-11-14 NOTE — PLAN OF CARE
Problem: Physical Therapy Goal  Goal: Physical Therapy Goal  Goals to be met by 11-17-18.  1. Sup<>sit mod I  2. Sit<>stand with RW mod I  3. amb 150' with RW mod I     Outcome: Ongoing (interventions implemented as appropriate)    Patient required Min A for bed mobility, Min A for transfers. Patient was limited with gait secondary to awaiting brief to be sent up.

## 2018-11-14 NOTE — PROGRESS NOTES
[facility]  Infectious Disease  Progress Note    Patient Name: Silvano Parmar  MRN: [id number]  Admission Date: 11/9/2018  Length of Stay: 5 days  Attending Physician: Silvano Mac MD  Primary Care Provider: Silvestre Ramirez MD    Isolation Status: No active isolations  Assessment/Plan:      * Community acquired pneumonia of right upper lobe of lung           Streptococcal bacteremia    Patient with strep bacteremia and abnormal CXR with hypoxia.  Likely pneumonia is source of bacteremia- possible additional sources include teeth and colon. Needs dental work done on teeth once discharged.   TTE reported to be negative for vegetations. ALYX not planned as patient's family would like to avoid invasive procedures.  Would pursue colonoscopy as outpatient for completeness if family would like to.   Repeat blood cultures are negative thus far.  Discussed yeast described on blood culture stain with micro lab. Mycology expert in lab reviewed the gram stain and there is no yeast identified. Appears to be c/w strep, not yeast. Nothing growing on mycology plates either. So stopped micafungin.  Since ALXY not being done and patient with poor dentition, would treat for endocarditis. Recommend 4 weeks IV CTX- strep is sensitive. CBC, CMP weekly while on therapy.  Discussed with Dr. Mac and Patient.  Please reconsult prn.           Anticipated Disposition: skilled    Thank you for your consult. I will sign off. Please contact us if you have any additional questions.    Katherine K Baumgarten, MD  Infectious Disease  [facility]    Subjective:     Principal Problem:Community acquired pneumonia of right upper lobe of lung    HPI: Mr. Parmar is an 88 year old man with a history of parkinsons disease, atrial fibrillation, diabetes, spinal stenosis and frequent falls who was found on the floor by his bed this morning at 6:30AM by the staff at [facility] where he resides.  He states he  simply slid out of bed to the floor when getting out of be and was unable to get up off the floor.  He denied any injury.  Unfortunately, he was found on the floor again two other times on 11/9. He has no recollection of how those occurred.  He has a long history of falls, and at one point suffered a small subdural hematoma. He denies any fevers, cough, shortness of breath, loss of consciousness, headache, neck stiffness, palpitations or new weakness.   Denies N/V/D. Denies abdominal pain. Does need dental work done- was supposed to have dental appointment on Wednesday. He does not remember having a prior colonoscopy.     In the ER he was found to have a right upper lobe infiltrate on chest xray, a leukocytosis and mild asymptomatic hypoxia and is being admitted for treatment of community acquired pneumonia. Blood cultures from 11/9 were all positive for Strep luteutiensis. The gram stain reported yeast. The patient is currently on azithro, CTX, and lashanda. ID consulted for management and ABX recs.        Interval History: reports he is comfortable. Mildly slow to respond.    Review of Systems   Constitutional: Positive for activity change. Negative for appetite change, chills and fever.   HENT: Positive for dental problem. Negative for congestion, facial swelling, mouth sores, sore throat and trouble swallowing.    Eyes: Negative for photophobia, pain and redness.   Respiratory: Negative for cough, chest tightness and wheezing.    Cardiovascular: Positive for leg swelling. Negative for chest pain.   Gastrointestinal: Negative for abdominal distention, abdominal pain, diarrhea, nausea and vomiting.   Endocrine: Negative for heat intolerance.   Genitourinary: Negative for difficulty urinating, flank pain and urgency.   Musculoskeletal: Negative for arthralgias and joint swelling.   Skin: Negative for rash.   Neurological: Positive for weakness. Negative for speech difficulty and headaches.   Hematological: Negative for  adenopathy.   Psychiatric/Behavioral: Negative for behavioral problems and hallucinations.     Objective:     Vital Signs (Most Recent):  Temp: 96.4 °F (35.8 °C) (11/14/18 0744)  Pulse: 72 (11/14/18 1000)  Resp: (!) 22 (11/14/18 0744)  BP: (!) 166/92 (11/14/18 0744)  SpO2: 96 % (11/14/18 0744) Vital Signs (24h Range):  Temp:  [96.4 °F (35.8 °C)-97.7 °F (36.5 °C)] 96.4 °F (35.8 °C)  Pulse:  [66-78] 72  Resp:  [18-23] 22  SpO2:  [88 %-98 %] 96 %  BP: (134-171)/(71-93) 166/92     Weight: 97.5 kg (215 lb)  Body mass index is 29.16 kg/m².    Estimated Creatinine Clearance: 56.2 mL/min (based on SCr of 1.1 mg/dL).    Physical Exam   Constitutional: He appears well-developed and well-nourished. He is cooperative. He is easily aroused.  Non-toxic appearance. No distress.   Mild masked facies.   HENT:   Head: Normocephalic and atraumatic. Head is without right periorbital erythema and without left periorbital erythema.   Right Ear: Hearing and external ear normal. No swelling.   Left Ear: Hearing and external ear normal. No swelling.   Nose: Nose normal.   Mouth/Throat: Oropharynx is clear and moist. No oropharyngeal exudate.   Very poor dentition   Eyes: Conjunctivae, EOM and lids are normal. Pupils are equal, round, and reactive to light. Right eye exhibits no discharge and no exudate. Left eye exhibits no discharge and no exudate. Right conjunctiva is not injected. Right conjunctiva has no hemorrhage. Left conjunctiva is not injected. Left conjunctiva has no hemorrhage. No scleral icterus.   Neck: Normal range of motion. Neck supple. No tracheal deviation present.   Cardiovascular: Normal rate, regular rhythm and normal heart sounds. Exam reveals no gallop and no friction rub.   No murmur heard.  Pulmonary/Chest: Breath sounds normal. No accessory muscle usage or stridor. No apnea. No respiratory distress. He has no wheezes. He has no rales. He exhibits no tenderness.   Mildly SOB, nasal canula not in place so re adjusted    Abdominal: Soft. Normal appearance and bowel sounds are normal. He exhibits no distension and no mass. There is no tenderness. There is no rebound and no guarding.   Musculoskeletal: Normal range of motion. He exhibits edema. He exhibits no tenderness.   Upper extremity and LE edema. Pitting-2+LE.  Arthritis changes DIP both hands.  Right arm midline in place with biopatch. Stat lock also in place.   Neurological: He is alert and easily aroused. No cranial nerve deficit. Coordination normal.   Skin: Skin is warm, dry and intact. No lesion and no rash noted. No cyanosis or erythema. No pallor. Nails show no clubbing.   Psychiatric: He has a normal mood and affect. His speech is normal and behavior is normal. Judgment normal.   Nursing note and vitals reviewed.      Significant Labs:   Blood Culture:   Recent Labs   Lab 11/09/18  1419 11/11/18 2003 11/13/18  0610 11/13/18  0615   LABBLOO Gram stain getachew bottle: Gram positive cocci   Results called to and read back by: Inga Faustin RN  11/10/2018    05:12  STREPTOCOCCUS LUTETIENSIS  For susceptibility see order #[id number]    Gram stain getachew bottle: Gram positive cocci   Results called to and read back by: Inga Faustin RN  11/10/2018    05:11  Results called to and read back by: Linette Brito RN 11/10/2018  21:21  Gram stain aer bottle: Gram positive cocci in chains resembling Strep  Results reviewed and amended. Called to and read back by:Dr. Kathy Baumgarten 11/12/2018  14:00   Previously reported as budding yeast  STREPTOCOCCUS LUTETIENSIS No Growth to date  No Growth to date No Growth to date  No Growth to date No Growth to date  No Growth to date       Significant Imaging: I have reviewed all pertinent imaging results/findings within the past 24 hours.

## 2018-11-14 NOTE — PLAN OF CARE
Pt discharging to [facility] today.  Transport arranged with Rosa jaimes.    RN informed to call report to [phone], transport will be here around 1700.    Pt and family notified and are in agreement with plan.    No further CM needs or barriers for discharge.     11/14/18 1559   Final Note   Assessment Type Final Discharge Note   Anticipated Discharge Disposition SNF   What phone number can be called within the next 1-3 days to see how you are doing after discharge? [phone]   Hospital Follow Up  Appt(s) scheduled? Yes   Discharge plans and expectations educations in teach back method with documentation complete? Yes   Right Care Referral Info   Post Acute Recommendation SNF / Sub-Acute Rehab

## 2018-11-14 NOTE — ASSESSMENT & PLAN NOTE
Patient with strep bacteremia and abnormal CXR with hypoxia.  Likely pneumonia is source of bacteremia- possible additional sources include teeth and colon. Needs dental work done on teeth once discharged.   TTE reported to be negative for vegetations. ALYX not planned as patient's family would like to avoid invasive procedures.  Would pursue colonoscopy as outpatient for completeness if family would like to.   Repeat blood cultures are negative thus far.  Discussed yeast described on blood culture stain with micro lab. Mycology expert in lab reviewed the gram stain and there is no yeast identified. Appears to be c/w strep, not yeast. Nothing growing on mycology plates either. So stopped micafungin.  Since ALYX not being done and patient with poor dentition, would treat for endocarditis. Recommend 4 weeks IV CTX- strep is sensitive. CBC, CMP weekly while on therapy.  Discussed with Dr. Mac and Patient.  Please reconsult prn.

## 2018-11-14 NOTE — PLAN OF CARE
Problem: Occupational Therapy Goal  Goal: Occupational Therapy Goal  Goals to be met by 12/10/18  1. Assess self-feeding  2. SBA G/H seated EOB  3. Mod A UBD seated EOB or chair (MET 11/14/18)  REVISED Min A UBD seated at EOB or UIC  4. Assess bed-chair transfer MET 11/13/18   REVISED CGA for for all functional transfers with RW  Outcome: Ongoing (interventions implemented as appropriate)  Continues with confusion, assist with problem solving for all tasks (bed mobility, hand placement for sit to/from stand, UBD, grooming).  Continue OT services to maximize patient functioning.    Comments: NELLY Ochoa, 11/14/2018

## 2018-11-14 NOTE — PLAN OF CARE
Problem: Patient Care Overview  Goal: Individualization & Mutuality  Outcome: Ongoing (interventions implemented as appropriate)  Bed in low and locked position and able to reposition per self and up with assist.  Remains free of injury during shift.  Bed alarm, chair alarm, and e-sitter in use during shift.

## 2018-11-14 NOTE — PLAN OF CARE
CM sent orders and other required documents thru  and left a message for Ivy Ann at [facility] to inquire if pt could come today. Pending return call at this time.    CM to follow for plans and arrangements.

## 2018-11-14 NOTE — SUBJECTIVE & OBJECTIVE
Interval History: reports he is comfortable. Mildly slow to respond.    Review of Systems   Constitutional: Positive for activity change. Negative for appetite change, chills and fever.   HENT: Positive for dental problem. Negative for congestion, facial swelling, mouth sores, sore throat and trouble swallowing.    Eyes: Negative for photophobia, pain and redness.   Respiratory: Negative for cough, chest tightness and wheezing.    Cardiovascular: Positive for leg swelling. Negative for chest pain.   Gastrointestinal: Negative for abdominal distention, abdominal pain, diarrhea, nausea and vomiting.   Endocrine: Negative for heat intolerance.   Genitourinary: Negative for difficulty urinating, flank pain and urgency.   Musculoskeletal: Negative for arthralgias and joint swelling.   Skin: Negative for rash.   Neurological: Positive for weakness. Negative for speech difficulty and headaches.   Hematological: Negative for adenopathy.   Psychiatric/Behavioral: Negative for behavioral problems and hallucinations.     Objective:     Vital Signs (Most Recent):  Temp: 96.4 °F (35.8 °C) (11/14/18 0744)  Pulse: 72 (11/14/18 1000)  Resp: (!) 22 (11/14/18 0744)  BP: (!) 166/92 (11/14/18 0744)  SpO2: 96 % (11/14/18 0744) Vital Signs (24h Range):  Temp:  [96.4 °F (35.8 °C)-97.7 °F (36.5 °C)] 96.4 °F (35.8 °C)  Pulse:  [66-78] 72  Resp:  [18-23] 22  SpO2:  [88 %-98 %] 96 %  BP: (134-171)/(71-93) 166/92     Weight: 97.5 kg (215 lb)  Body mass index is 29.16 kg/m².    Estimated Creatinine Clearance: 56.2 mL/min (based on SCr of 1.1 mg/dL).    Physical Exam   Constitutional: He appears well-developed and well-nourished. He is cooperative. He is easily aroused.  Non-toxic appearance. No distress.   Mild masked facies.   HENT:   Head: Normocephalic and atraumatic. Head is without right periorbital erythema and without left periorbital erythema.   Right Ear: Hearing and external ear normal. No swelling.   Left Ear: Hearing and external  ear normal. No swelling.   Nose: Nose normal.   Mouth/Throat: Oropharynx is clear and moist. No oropharyngeal exudate.   Very poor dentition   Eyes: Conjunctivae, EOM and lids are normal. Pupils are equal, round, and reactive to light. Right eye exhibits no discharge and no exudate. Left eye exhibits no discharge and no exudate. Right conjunctiva is not injected. Right conjunctiva has no hemorrhage. Left conjunctiva is not injected. Left conjunctiva has no hemorrhage. No scleral icterus.   Neck: Normal range of motion. Neck supple. No tracheal deviation present.   Cardiovascular: Normal rate, regular rhythm and normal heart sounds. Exam reveals no gallop and no friction rub.   No murmur heard.  Pulmonary/Chest: Breath sounds normal. No accessory muscle usage or stridor. No apnea. No respiratory distress. He has no wheezes. He has no rales. He exhibits no tenderness.   Mildly SOB, nasal canula not in place so re adjusted   Abdominal: Soft. Normal appearance and bowel sounds are normal. He exhibits no distension and no mass. There is no tenderness. There is no rebound and no guarding.   Musculoskeletal: Normal range of motion. He exhibits edema. He exhibits no tenderness.   Upper extremity and LE edema. Pitting-2+LE.  Arthritis changes DIP both hands.  Right arm midline in place with biopatch. Stat lock also in place.   Neurological: He is alert and easily aroused. No cranial nerve deficit. Coordination normal.   Skin: Skin is warm, dry and intact. No lesion and no rash noted. No cyanosis or erythema. No pallor. Nails show no clubbing.   Psychiatric: He has a normal mood and affect. His speech is normal and behavior is normal. Judgment normal.   Nursing note and vitals reviewed.      Significant Labs:   Blood Culture:   Recent Labs   Lab 11/09/18  1419 11/11/18 2003 11/13/18  0610 11/13/18  0615   LABBLOO Gram stain getachew bottle: Gram positive cocci   Results called to and read back by: Inga Faustin RN  11/10/2018     05:12  STREPTOCOCCUS LUTETIENSIS  For susceptibility see order #[id number]    Gram stain getachew bottle: Gram positive cocci   Results called to and read back by: Inga Faustin RN  11/10/2018    05:11  Results called to and read back by: Linette Brito RN 11/10/2018  21:21  Gram stain aer bottle: Gram positive cocci in chains resembling Strep  Results reviewed and amended. Called to and read back by:Dr. Kathy Baumgarten 11/12/2018  14:00   Previously reported as budding yeast  STREPTOCOCCUS LUTETIENSIS No Growth to date  No Growth to date No Growth to date  No Growth to date No Growth to date  No Growth to date       Significant Imaging: I have reviewed all pertinent imaging results/findings within the past 24 hours.

## 2018-11-14 NOTE — PROGRESS NOTES
Report called to nursing home and no further questions noted.  No george present on pt and diaper applied due to incontinence.  Waiting on w/c escort for discharge.  No distress noted.

## 2018-11-15 ENCOUNTER — DOCUMENTATION ONLY (OUTPATIENT)
Dept: ADMINISTRATIVE | Facility: OTHER | Age: 83
End: 2018-11-15

## 2018-11-15 ENCOUNTER — PATIENT OUTREACH (OUTPATIENT)
Dept: ADMINISTRATIVE | Facility: CLINIC | Age: 83
End: 2018-11-15

## 2018-11-15 NOTE — PT/OT/SLP DISCHARGE
Physical Therapy Discharge Summary    Name: Silvano Parmar  MRN: [id number]   Principal Problem: Community acquired pneumonia of right upper lobe of lung     Patient Discharged from acute Physical Therapy on 11/14/18.  Please refer to prior PT noted date on 11/14/18 for functional status.     Assessment:     Patient has not met goals.    Objective:     GOALS:   Multidisciplinary Problems     Physical Therapy Goals        Problem: Physical Therapy Goal    Goal Priority Disciplines Outcome Goal Variances Interventions   Physical Therapy Goal     PT, PT/OT Ongoing (interventions implemented as appropriate)     Description:  Goals to be met by 11-17-18.  1. Sup<>sit mod I  2. Sit<>stand with RW mod I  3. amb 150' with RW mod I                      Reasons for Discontinuation of Therapy Services  Transfer to alternate level of care.      Plan:     Patient Discharged to: Skilled Nursing Facility.    PT of record not available for documentation.      Altagracia Butler, PT  11/15/2018

## 2018-11-15 NOTE — PLAN OF CARE
Problem: Patient Care Overview  Goal: Plan of Care Review  Outcome: Ongoing (interventions implemented as appropriate)  Patient in no apparent distress. Sat's  95 % on room air. PRN treatment not needed . Will continue to monitor.

## 2018-11-15 NOTE — PROGRESS NOTES
Please note that this patient was not enrolled in Outpatient Case Management at this time due to being transferred to a SNF facility.     Please contact [facility] at Ext. [phone] with questions.    Thank you,    Magali Pastrana    Outpatient Case Management

## 2018-11-15 NOTE — PT/OT/SLP DISCHARGE
Occupational Therapy Discharge Summary    Silvano Parmar  MRN: [id number]   Principal Problem: Community acquired pneumonia of right upper lobe of lung      Patient Discharged from acute Occupational Therapy on 11/14/2018.  Please refer to prior OT note dated 11/14/2018 for functional status.    Assessment:      Goals partially met.    Objective:     GOALS:   Multidisciplinary Problems     Occupational Therapy Goals        Problem: Occupational Therapy Goal    Goal Priority Disciplines Outcome Interventions   Occupational Therapy Goal     OT, PT/OT Ongoing (interventions implemented as appropriate)    Description:  Goals to be met by 12/10/18  1. Assess self-feeding  2. SBA G/H seated EOB  3. Mod A UBD seated EOB or chair (MET 11/14/18)  REVISED Min A UBD seated at EOB or UIC  4. Assess bed-chair transfer MET 11/13/18   REVISED CGA for for all functional transfers with RW                    Reasons for Discontinuation of Therapy Services  Transfer to alternate level of care.      Plan:     Patient Discharged to: Skilled Nursing Facility    Marissa Franz, OTR/L  11/15/2018     OT of record not present at this time.

## 2018-11-15 NOTE — NURSING
Markell with Rosa at bedside. Paperwork provided. Patient in stable condition. Denies any pain. Respirations are unlabored on room air. Son Jesus remains at bedside, attentive to patient's needs. Patient being transferred to [facility].     Report called earlier per day nurse Nancy Delarosa.

## 2018-11-15 NOTE — NURSING
Patient's son Jesus upset about the delay on transfer. Estimated time of arrival now 2030. Will check back with GA and will keep patient and son updated.

## 2018-11-15 NOTE — NURSING
Phoned beth for update.  Have four transports in front of this patient.  Unable to give accurate estimate. Possibly 2 hours.  Beth phone number is [phone]

## 2018-11-17 LAB — BACTERIA BLD CULT: NORMAL

## 2018-11-18 LAB
BACTERIA BLD CULT: NORMAL
BACTERIA BLD CULT: NORMAL

## 2018-11-20 ENCOUNTER — TELEPHONE (OUTPATIENT)
Dept: ADMINISTRATIVE | Facility: CLINIC | Age: 83
End: 2018-11-20

## 2018-11-20 NOTE — PROGRESS NOTES
Attended IDT meeting at [place] to obtain patient update - Patient is receiving antibiotics as ordered until 12/8/2018. Plans to D/C 12/9/2018 back to [facility].  Clinical Status:    Progress made/or loss:    Therapies Involved with Care:      PT:    Ambulation:SBA     Sit to Stand:    Pivot:    Bed Function:    Transfers:min- SBA    Bed Mobility:    OT:    Toileting:min assist    Hygiene:    Upper Body dressing:min assist    Lower Body dressing:min assist    Clothing management:    ADL's:    ST    Diet Order    Cognition:safety is a major concern due to Parkinson's diagnosis      Barriers to Progress:safety    New Treatments:    Projected LOS:    Projected Discharge date:12/9/2018    Discharge Disposition:

## 2018-11-27 ENCOUNTER — TELEPHONE (OUTPATIENT)
Dept: ADMINISTRATIVE | Facility: CLINIC | Age: 83
End: 2018-11-27

## 2018-11-27 NOTE — PROGRESS NOTES
Attended IDT meeting at [place] to obtain patient update - Patient is here for antibiotic therapy because he lives at the [facility] & they do not provide this service. His antibiotics will be completed on 12/8/2018 & he will be D/C'd on 12/9/2018. He goes out of facility with family quite often.    Clinical Status:stable, no changes    Progress made/or loss:very good progress    Therapies Involved with Care:      PT:    Ambulation:over 200 ft. With rolling walker with SBA    Sit to Stand:    Pivot:    Bed Function:    Transfers:SBA    Bed Mobility:    OT:    Toileting:min assist (working on safety)    Hygiene:    Upper Body dressing:SBA    Lower Body dressing:SBA    Clothing management:    ADL's:    ST    Diet Order    Cognition:      Barriers to Progress:none    New Treatments:    Projected LOS:    Projected Discharge date:12/9/2018   Discharge Disposition:

## 2018-12-04 ENCOUNTER — TELEPHONE (OUTPATIENT)
Dept: ADMINISTRATIVE | Facility: CLINIC | Age: 83
End: 2018-12-04

## 2018-12-04 ENCOUNTER — HOSPITAL ENCOUNTER (EMERGENCY)
Facility: OTHER | Age: 83
End: 2018-12-04
Attending: EMERGENCY MEDICINE
Payer: MEDICARE

## 2018-12-04 VITALS
WEIGHT: 217 LBS | RESPIRATION RATE: 20 BRPM | HEART RATE: 70 BPM | DIASTOLIC BLOOD PRESSURE: 70 MMHG | HEIGHT: 72 IN | BODY MASS INDEX: 29.39 KG/M2 | TEMPERATURE: 98 F | OXYGEN SATURATION: 92 % | SYSTOLIC BLOOD PRESSURE: 127 MMHG

## 2018-12-04 DIAGNOSIS — S01.311A LACERATION OF RIGHT EAR LOBE, INITIAL ENCOUNTER: Primary | ICD-10-CM

## 2018-12-04 PROCEDURE — 25000003 PHARM REV CODE 250: Performed by: EMERGENCY MEDICINE

## 2018-12-04 PROCEDURE — 99284 EMERGENCY DEPT VISIT MOD MDM: CPT | Mod: 25

## 2018-12-04 PROCEDURE — 90715 TDAP VACCINE 7 YRS/> IM: CPT | Performed by: EMERGENCY MEDICINE

## 2018-12-04 PROCEDURE — 12013 RPR F/E/E/N/L/M 2.6-5.0 CM: CPT | Mod: 59,RT

## 2018-12-04 PROCEDURE — 90471 IMMUNIZATION ADMIN: CPT | Performed by: EMERGENCY MEDICINE

## 2018-12-04 PROCEDURE — 96365 THER/PROPH/DIAG IV INF INIT: CPT

## 2018-12-04 PROCEDURE — 63600175 PHARM REV CODE 636 W HCPCS: Performed by: EMERGENCY MEDICINE

## 2018-12-04 RX ORDER — BACITRACIN ZINC 500 UNIT/G
1 OINTMENT (GRAM) TOPICAL ONCE
Status: COMPLETED | OUTPATIENT
Start: 2018-12-04 | End: 2018-12-04

## 2018-12-04 RX ORDER — LIDOCAINE HYDROCHLORIDE 10 MG/ML
10 INJECTION, SOLUTION EPIDURAL; INFILTRATION; INTRACAUDAL; PERINEURAL
Status: COMPLETED | OUTPATIENT
Start: 2018-12-04 | End: 2018-12-04

## 2018-12-04 RX ADMIN — BACITRACIN ZINC 1 TUBE: 500 OINTMENT TOPICAL at 06:12

## 2018-12-04 RX ADMIN — CEFTRIAXONE 1 G: 1 INJECTION, SOLUTION INTRAVENOUS at 07:12

## 2018-12-04 RX ADMIN — CLOSTRIDIUM TETANI TOXOID ANTIGEN (FORMALDEHYDE INACTIVATED), CORYNEBACTERIUM DIPHTHERIAE TOXOID ANTIGEN (FORMALDEHYDE INACTIVATED), BORDETELLA PERTUSSIS TOXOID ANTIGEN (GLUTARALDEHYDE INACTIVATED), BORDETELLA PERTUSSIS FILAMENTOUS HEMAGGLUTININ ANTIGEN (FORMALDEHYDE INACTIVATED), BORDETELLA PERTUSSIS PERTACTIN ANTIGEN, AND BORDETELLA PERTUSSIS FIMBRIAE 2/3 ANTIGEN 0.5 ML: 5; 2; 2.5; 5; 3; 5 INJECTION, SUSPENSION INTRAMUSCULAR at 06:12

## 2018-12-04 RX ADMIN — LIDOCAINE HYDROCHLORIDE 100 MG: 10 INJECTION, SOLUTION EPIDURAL; INFILTRATION; INTRACAUDAL; PERINEURAL at 06:12

## 2018-12-04 NOTE — ED PROVIDER NOTES
Encounter Date: 12/4/2018    SCRIBE #1 NOTE: Robert MORELAND am scribing for, and in the presence of, Dr. Santoro .       History     Chief Complaint   Patient presents with    Fall     Per [facility] pt transported from [facility] after fall forward out of wheelchair, + laceration noted to right ear and skin tear to right forearm. Denies LOC or blood thinner use. EMS reports pt appears confused but at baseline per nursing facility. PICC line to RUE for prescribed IV antibiotic for pneumonia.      Time seen by provider: 5:27 PM    This is a 88 y.o. male, with history of HTN, DM, A-fib, Parkinson's disease, and spinal stenosis, who presents via EMS s/p fall that occurred approximately one hour ago. Per EMS, patient is a resident of [facility], and he fell forward out of a wheelchair. Per EMS, he has a cut to his right ear. He denies LOC, neck pain, or back pain. He denies use of blood thinners. Per EMS, patient appears confused but nursing staff reports he is at baseline mental status. He currently denies headaches, dizziness, SOB, or chest pain. He has no additional complaints.       The history is provided by the patient and the EMS personnel.     Review of patient's allergies indicates:   Allergen Reactions    Demerol [meperidine]      Past Medical History:   Diagnosis Date    *Atrial fibrillation     AI (aortic insufficiency)     mild    Atrial fibrillation     Diabetes mellitus     Diabetes mellitus, type 2     Hyperlipidemia     Hypertension     Memory loss     Parkinson disease     Spinal stenosis memory loss     Past Surgical History:   Procedure Laterality Date    CHOLECYSTECTOMY       History reviewed. No pertinent family history.  Social History     Tobacco Use    Smoking status: Never Smoker    Smokeless tobacco: Never Used   Substance Use Topics    Alcohol use: No    Drug use: No     Review of Systems   Constitutional: Negative for chills and fever.   HENT:  Negative for congestion, rhinorrhea and sore throat.    Respiratory: Negative for cough and shortness of breath.    Cardiovascular: Negative for chest pain.   Gastrointestinal: Negative for abdominal pain, diarrhea, nausea and vomiting.   Musculoskeletal: Negative for back pain and neck pain.   Skin: Positive for wound (right ear). Negative for color change.   Neurological: Negative for dizziness, syncope and headaches.       Physical Exam     Initial Vitals [12/04/18 1710]   BP Pulse Resp Temp SpO2   (!) 153/78 102 (!) 21 98.2 °F (36.8 °C) 95 %      MAP       --         Physical Exam    Nursing note and vitals reviewed.  Constitutional: He appears well-developed and well-nourished. No distress.   HENT:   Head: Normocephalic.   4 cm laceration to the right, upper, outer helix of the ear.    Eyes: Conjunctivae and EOM are normal. Pupils are equal, round, and reactive to light.   Neck: Normal range of motion. Neck supple.   Cardiovascular: Normal rate, regular rhythm, normal heart sounds and intact distal pulses.   Pulmonary/Chest: Breath sounds normal. No respiratory distress. He has no wheezes. He has no rhonchi. He has no rales.   Musculoskeletal: Normal range of motion. He exhibits no tenderness.   No midline C/T/L-spine tenderness.   Neurological: He is alert and oriented to person, place, and time. He has normal strength. No cranial nerve deficit or sensory deficit. GCS score is 15. GCS eye subscore is 4. GCS verbal subscore is 5. GCS motor subscore is 6.   Skin: Skin is warm and dry.   1.5 cm abrasion to right forearm.    Psychiatric: He has a normal mood and affect. His behavior is normal. Judgment and thought content normal.         ED Course   Lac Repair  Date/Time: 12/4/2018 7:29 PM  Performed by: Liliam Santoro MD  Authorized by: Liliam Santoro MD   Body area: head/neck  Location details: right ear  Laceration length: 4 cm  Foreign bodies: no foreign bodies  Tendon involvement: none  Nerve involvement:  none  Vascular damage: no  Anesthesia: local infiltration    Anesthesia:  Local Anesthetic: lidocaine 1% without epinephrine  Anesthetic total: 4 mL  Patient sedated: no  Preparation: Patient was prepped and draped in the usual sterile fashion.  Irrigation solution: saline  Irrigation method: syringe  Amount of cleaning: standard  Debridement: none  Degree of undermining: none  Wound skin closure material used: 6-0 ethilon   Number of sutures: 9  Technique: simple  Approximation: close  Approximation difficulty: simple  Dressing: antibiotic ointment and non-stick sterile dressing  Patient tolerance: Patient tolerated the procedure well with no immediate complications        Labs Reviewed - No data to display       Imaging Results          CT Cervical Spine Without Contrast (Final result)  Result time 12/04/18 18:06:32    Final result by Socrates Chavez MD (12/04/18 18:06:32)                 Impression:      No evidence of acute cervical spine fracture or dislocation.      Electronically signed by: Socrates Chavez MD  Date:    12/04/2018  Time:    18:06             Narrative:    EXAMINATION:  CT CERVICAL SPINE WITHOUT CONTRAST    CLINICAL HISTORY:  Spine fracture, traumatic, cervical;    TECHNIQUE:  Low dose axial images, sagittal and coronal reformations were performed though the cervical spine.  Contrast was not administered.    COMPARISON:  CT cervical spine from 11/09/2018.    FINDINGS:  No evidence of acute cervical spine fracture or dislocation.  Odontoid process is intact.  Craniocervical junction is unremarkable.  Cervical spine alignment is stable.  Extensive multilevel degenerative changes and facet arthropathy are seen, not significantly changed compared to recent CT from 11/09/2018.    Surrounding soft tissues show no significant abnormalities.  Airway is patent.  Partially visualized lung apices are clear.                               CT Head Without Contrast (Final result)  Result time 12/04/18  18:03:06    Final result by Socrates Chavez MD (12/04/18 18:03:06)                 Impression:      No acute intracranial abnormalities identified.  No significant detrimental change compared to recent CT head from 11/09/2018.      Electronically signed by: Socrates Chavez MD  Date:    12/04/2018  Time:    18:03             Narrative:    EXAMINATION:  CT HEAD WITHOUT CONTRAST    CLINICAL HISTORY:  Headache, post trauma;    TECHNIQUE:  Low dose axial images were obtained through the head.  Coronal and sagittal reformations were also performed. Contrast was not administered.    COMPARISON:  CT head from 11/09/2018.    FINDINGS:  There is generalized cerebral volume loss with chronic microvascular ischemic change.  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  No evidence of acute/recent major vascular distribution cerebral infarction, intraparenchymal hemorrhage, or intra-axial space occupying lesion. The ventricular system is normal in size and configuration with no evidence of hydrocephalus. No effacement of the skull-base cisterns. No abnormal extra-axial fluid collections or blood products. The visualized paranasal sinuses and mastoid air cells are clear. The calvarium shows no significant abnormality.                                 Medical Decision Making:   History:   Old Medical Records: I decided to obtain old medical records.  Old Records Summarized: other records, records from clinic visits and records from previous admission(s).  Initial Assessment:   5:27PM:  Patient is an 88-year-old male who presents to the emergency room after a fall.  Patient has a right forearm abrasion along with a right ear laceration.  Will plan for imaging.  Will continue to follow and reassess.  Clinical Tests:   Radiological Study: Ordered and Reviewed    7:29PM:  Patient doing well.  His imaging is negative for any acute findings.  He tolerated laceration repair well with no issues.  I did give him his normal  dose of IV antibiotics that he was due for while he was here.  He did receive a tetanus shot.  I counseled the patient regarding wound care management.  I have discussed with the pt ED return warnings and need for close PCP f/u.  Pt agreeable to plan and all questions answered.  I feel that pt is stable for discharge and management as an outpatient and no further intervention is needed at this time.  Pt is comfortable returning to the ED if needed.  Will DC home in stable condition.                Scribe Attestation:   Scribe #1: I performed the above scribed service and the documentation accurately describes the services I performed. I attest to the accuracy of the note.    Attending Attestation:           Physician Attestation for Scribe:  Physician Attestation Statement for Scribe #1: I, Dr. Santoro , reviewed documentation, as scribed by Robert Del Rio  in my presence, and it is both accurate and complete.                    Clinical Impression:     1. Laceration of right ear lobe, initial encounter                                   Liliam Santoro MD  12/04/18 [id number]

## 2018-12-04 NOTE — PROGRESS NOTES
Attended IDT meeting at [place] to obtain patient update - Clinically stable at this time. Patient is meeting all goals for therapy & is here to complete antibiotic course. He is scheduled for his last dose of antibiotics on 12/7/2018 & will D/C on 12/8/2018.

## 2018-12-05 NOTE — DISCHARGE INSTRUCTIONS
Have the sutures removed in 7-10 days.    Please return to the ER if you have chest pain, difficulty breathing, fevers, altered mental status, dizziness, weakness, or any other concerns.      Follow up with your primary care physician.

## 2018-12-05 NOTE — ED NOTES
Fall at facility today with laceration to R ear, skin tears to R FA. AAOx3, respirations even and unlabored, HR regular and WNL, skin is warm and dry with good turgor. picc line to [facility] for IV abx. No deformities noted, NAD

## 2018-12-05 NOTE — ED NOTES
Pt cleaned with saline and dried appropriately. Pt tolerated well. Bacitracin ointment placed on patient cut on R forearm and on R ear. Pt R forearm was dressed with non-adherent gauze and secured with tegaderm.

## 2018-12-05 NOTE — ED NOTES
Spoke with Stephanie JAMISON at [facility] and gave report on patient returning back to facility. RN states it is OK for patient to return back to nursing home with family instead of calling an ambulance. Son at bedside notified OK for him to return patient back to nursing home.

## 2018-12-11 ENCOUNTER — TELEPHONE (OUTPATIENT)
Dept: ADMINISTRATIVE | Facility: CLINIC | Age: 83
End: 2018-12-11

## 2018-12-11 NOTE — PROGRESS NOTES
Attended IDT meeting at [place] to obtain patient update - Patient D/C'd to [facility] on 12/8/2018.

## 2018-12-19 ENCOUNTER — HOSPITAL ENCOUNTER (EMERGENCY)
Facility: OTHER | Age: 83
Discharge: HOME | End: 2018-12-19
Attending: EMERGENCY MEDICINE
Payer: MEDICARE

## 2018-12-19 VITALS
WEIGHT: 214.94 LBS | OXYGEN SATURATION: 95 % | TEMPERATURE: 98 F | RESPIRATION RATE: 18 BRPM | HEART RATE: 75 BPM | BODY MASS INDEX: 29.15 KG/M2 | SYSTOLIC BLOOD PRESSURE: 134 MMHG | DIASTOLIC BLOOD PRESSURE: 78 MMHG

## 2018-12-19 DIAGNOSIS — Z48.02 VISIT FOR SUTURE REMOVAL: Primary | ICD-10-CM

## 2018-12-19 DIAGNOSIS — S01.311A LACERATION OF RIGHT EAR, INITIAL ENCOUNTER: ICD-10-CM

## 2018-12-19 PROCEDURE — 99281 EMR DPT VST MAYX REQ PHY/QHP: CPT

## 2018-12-19 NOTE — ED PROVIDER NOTES
Encounter Date: 12/19/2018       History     Chief Complaint   Patient presents with    Suture / Staple Removal     Pt sent down per Dr. Ramirez for suture removal of 2 sutures from the right ear.     88-year-old male with history of hypertension, hyperlipidemia, diabetes, aortic insufficiency, AFib, Parkinson's disease, spinal stenosis and memory loss presents to the emergency department with a family member here for suture removal.  He was seen at his primary care physician's office who had difficulty removing all the sutures.  Some of the sutures were removed in office.  Patient's PCP was concerned that 2 sutures still remained.  Sent here for removal.  Denies any complaints.  States that the wound is well healed.  No drainage or pain.      The history is provided by the patient and a relative.     Review of patient's allergies indicates:   Allergen Reactions    Demerol [meperidine]      Past Medical History:   Diagnosis Date    *Atrial fibrillation     AI (aortic insufficiency)     mild    Atrial fibrillation     Diabetes mellitus     Diabetes mellitus, type 2     Hyperlipidemia     Hypertension     Memory loss     Parkinson disease     Spinal stenosis memory loss     Past Surgical History:   Procedure Laterality Date    CHOLECYSTECTOMY       History reviewed. No pertinent family history.  Social History     Tobacco Use    Smoking status: Never Smoker    Smokeless tobacco: Never Used   Substance Use Topics    Alcohol use: No    Drug use: No     Review of Systems   Skin: Positive for wound (right ear with sutures that need removal).   Hematological: Does not bruise/bleed easily.       Physical Exam     Initial Vitals [12/19/18 1206]   BP Pulse Resp Temp SpO2   134/78 75 18 98 °F (36.7 °C) 95 %      MAP       --         Physical Exam    Nursing note and vitals reviewed.  Constitutional: Vital signs are normal. He appears well-developed and well-nourished. He is not diaphoretic.  Non-toxic  appearance. No distress.   HENT:   Head: Normocephalic and atraumatic.   Right Ear: External ear normal.   Left Ear: External ear normal.   Ears:    Nose: Nose normal.   Well healed wound with no dehiscence. 4 sutures remain in place. Minimal amount of bleeding. No other drainage. No TTP. Wound is clean, dry and intact   Eyes: Conjunctivae and lids are normal. No scleral icterus.   Neck: Phonation normal. Neck supple.   Abdominal: Normal appearance.   Musculoskeletal:   No obvious deformities, moving all extremities   Neurological: He is alert. No sensory deficit.   Skin: Skin is warm, dry and intact. No rash noted.   Psychiatric: He has a normal mood and affect. His speech is normal. Cognition and memory are normal.         ED Course   Suture Removal  Date/Time: 12/19/2018 12:25 PM  Performed by: Deepali Adrian PA-C  Authorized by: Balta Kim MD   Body area: head/neck  Location details: right ear  Wound Appearance: clean, well healed, normal color and no drainage  Sutures Removed: 4  Post-removal: no dressing applied  Facility: sutures placed in this facility  Patient tolerance: Patient tolerated the procedure well with no immediate complications  Comments: Sutures partially removed in PCP office. Sent here due to difficulty removing remaining sutures. 4 sutures removed in the ED.        Labs Reviewed - No data to display       Imaging Results    None          Medical Decision Making:   History:   I obtained history from: someone other than patient.       <> Summary of History: Relative  Old Medical Records: I decided to obtain old medical records.  Initial Assessment:   88-year-old male with complaints consistent with laceration to the right ear here for suture removal.  Vital signs stable, afebrile, neurovascularly intact.  He is alert and healthy and nontoxic appearing.  He is in no apparent distress. Exam documented above.  ED Management:  Four sutures were removed by me.  He tolerated well without  immediate complications.  Will discharge home with care instructions.  He is urged to follow up with his primary care physician as needed or return to the emergency department for any worsening signs or symptoms.  They state understanding agrees this plan.  This is the extent of patient's complaints today.  This note was created using [employer] dictation.  There may be typographical errors secondary to dictation.                        Clinical Impression:     1. Visit for suture removal    2. Laceration of right ear, initial encounter                                   Deepali Adrian PA-C  12/19/18 3911

## 2018-12-19 NOTE — ED NOTES
Pt to er from md office to have suture removed from ear. Pt denies pain or drainage from site.  Area without redness noted. Pt aaox3 skin warm and dry. Transported via wheelchair.

## 2019-01-21 ENCOUNTER — LAB VISIT (OUTPATIENT)
Dept: LAB | Facility: OTHER | Age: 84
End: 2019-01-21
Attending: INTERNAL MEDICINE
Payer: MEDICARE

## 2019-01-21 DIAGNOSIS — N39.0 URINARY TRACT INFECTION WITHOUT HEMATURIA, SITE UNSPECIFIED: ICD-10-CM

## 2019-01-21 PROCEDURE — 87086 URINE CULTURE/COLONY COUNT: CPT

## 2019-01-23 LAB — BACTERIA UR CULT: NO GROWTH

## 2019-04-08 ENCOUNTER — HOSPITAL ENCOUNTER (INPATIENT)
Facility: OTHER | Age: 84
LOS: 3 days | Discharge: HOME HEALTH | DRG: 069 | End: 2019-04-11
Attending: EMERGENCY MEDICINE | Admitting: INTERNAL MEDICINE
Payer: MEDICARE

## 2019-04-08 DIAGNOSIS — L03.90 CELLULITIS, UNSPECIFIED CELLULITIS SITE: ICD-10-CM

## 2019-04-08 DIAGNOSIS — D69.3 CHRONIC IDIOPATHIC THROMBOCYTOPENIA: ICD-10-CM

## 2019-04-08 DIAGNOSIS — R40.20 LOC (LOSS OF CONSCIOUSNESS): ICD-10-CM

## 2019-04-08 DIAGNOSIS — G45.9 TIA (TRANSIENT ISCHEMIC ATTACK): Primary | ICD-10-CM

## 2019-04-08 DIAGNOSIS — N40.1 BENIGN PROSTATIC HYPERPLASIA WITH LOWER URINARY TRACT SYMPTOMS, SYMPTOM DETAILS UNSPECIFIED: Chronic | ICD-10-CM

## 2019-04-08 DIAGNOSIS — M79.605 LEFT LEG PAIN: ICD-10-CM

## 2019-04-08 DIAGNOSIS — R00.8 TRIGEMINY: ICD-10-CM

## 2019-04-08 DIAGNOSIS — R53.1 WEAKNESS: ICD-10-CM

## 2019-04-08 DIAGNOSIS — I48.19 PERSISTENT ATRIAL FIBRILLATION: ICD-10-CM

## 2019-04-08 DIAGNOSIS — I49.9 ABNORMAL HEART RHYTHM: ICD-10-CM

## 2019-04-08 DIAGNOSIS — G20.A1 PARKINSON DISEASE: Chronic | ICD-10-CM

## 2019-04-08 DIAGNOSIS — I10 ESSENTIAL HYPERTENSION: Chronic | ICD-10-CM

## 2019-04-08 PROBLEM — J96.01 ACUTE RESPIRATORY FAILURE WITH HYPOXIA: Status: RESOLVED | Noted: 2018-11-09 | Resolved: 2019-04-08

## 2019-04-08 PROBLEM — R78.81 STREPTOCOCCAL BACTEREMIA: Status: RESOLVED | Noted: 2018-11-11 | Resolved: 2019-04-08

## 2019-04-08 PROBLEM — J18.9 COMMUNITY ACQUIRED PNEUMONIA OF RIGHT UPPER LOBE OF LUNG: Status: RESOLVED | Noted: 2018-11-09 | Resolved: 2019-04-08

## 2019-04-08 PROBLEM — E83.42 HYPOMAGNESEMIA: Status: RESOLVED | Noted: 2018-11-09 | Resolved: 2019-04-08

## 2019-04-08 PROBLEM — B95.5 STREPTOCOCCAL BACTEREMIA: Status: RESOLVED | Noted: 2018-11-11 | Resolved: 2019-04-08

## 2019-04-08 LAB
ALBUMIN SERPL BCP-MCNC: 4 G/DL (ref 3.5–5.2)
ALP SERPL-CCNC: 111 U/L (ref 55–135)
ALT SERPL W/O P-5'-P-CCNC: 15 U/L (ref 10–44)
ANION GAP SERPL CALC-SCNC: 10 MMOL/L (ref 8–16)
ANION GAP SERPL CALC-SCNC: 12 MMOL/L (ref 8–16)
AST SERPL-CCNC: 25 U/L (ref 10–40)
BASOPHILS # BLD AUTO: 0.02 K/UL (ref 0–0.2)
BASOPHILS NFR BLD: 0.1 % (ref 0–1.9)
BILIRUB SERPL-MCNC: 1.9 MG/DL (ref 0.1–1)
BILIRUB UR QL STRIP: NEGATIVE
BNP SERPL-MCNC: 351 PG/ML (ref 0–99)
BUN SERPL-MCNC: 17 MG/DL (ref 8–23)
BUN SERPL-MCNC: 20 MG/DL (ref 8–23)
CALCIUM SERPL-MCNC: 8.8 MG/DL (ref 8.7–10.5)
CALCIUM SERPL-MCNC: 9.9 MG/DL (ref 8.7–10.5)
CHLORIDE SERPL-SCNC: 103 MMOL/L (ref 95–110)
CHLORIDE SERPL-SCNC: 107 MMOL/L (ref 95–110)
CLARITY UR: CLEAR
CO2 SERPL-SCNC: 21 MMOL/L (ref 23–29)
CO2 SERPL-SCNC: 28 MMOL/L (ref 23–29)
COLOR UR: YELLOW
CREAT SERPL-MCNC: 1.1 MG/DL (ref 0.5–1.4)
CREAT SERPL-MCNC: 1.3 MG/DL (ref 0.5–1.4)
DIFFERENTIAL METHOD: ABNORMAL
EOSINOPHIL # BLD AUTO: 0.1 K/UL (ref 0–0.5)
EOSINOPHIL NFR BLD: 0.7 % (ref 0–8)
ERYTHROCYTE [DISTWIDTH] IN BLOOD BY AUTOMATED COUNT: 14.6 % (ref 11.5–14.5)
EST. GFR  (AFRICAN AMERICAN): 56 ML/MIN/1.73 M^2
EST. GFR  (AFRICAN AMERICAN): >60 ML/MIN/1.73 M^2
EST. GFR  (NON AFRICAN AMERICAN): 49 ML/MIN/1.73 M^2
EST. GFR  (NON AFRICAN AMERICAN): 60 ML/MIN/1.73 M^2
GLUCOSE SERPL-MCNC: 90 MG/DL (ref 70–110)
GLUCOSE SERPL-MCNC: 96 MG/DL (ref 70–110)
GLUCOSE UR QL STRIP: NEGATIVE
HCT VFR BLD AUTO: 46.1 % (ref 40–54)
HGB BLD-MCNC: 15.4 G/DL (ref 14–18)
HGB UR QL STRIP: NEGATIVE
KETONES UR QL STRIP: NEGATIVE
LACTATE SERPL-SCNC: 1.1 MMOL/L (ref 0.5–2.2)
LEUKOCYTE ESTERASE UR QL STRIP: NEGATIVE
LYMPHOCYTES # BLD AUTO: 0.5 K/UL (ref 1–4.8)
LYMPHOCYTES NFR BLD: 3.3 % (ref 18–48)
MAGNESIUM SERPL-MCNC: 1.7 MG/DL (ref 1.6–2.6)
MCH RBC QN AUTO: 32.3 PG (ref 27–31)
MCHC RBC AUTO-ENTMCNC: 33.4 G/DL (ref 32–36)
MCV RBC AUTO: 97 FL (ref 82–98)
MONOCYTES # BLD AUTO: 1.1 K/UL (ref 0.3–1)
MONOCYTES NFR BLD: 7.2 % (ref 4–15)
NEUTROPHILS # BLD AUTO: 13.5 K/UL (ref 1.8–7.7)
NEUTROPHILS NFR BLD: 88.4 % (ref 38–73)
NITRITE UR QL STRIP: NEGATIVE
PH UR STRIP: 8 [PH] (ref 5–8)
PHOSPHATE SERPL-MCNC: 3.1 MG/DL (ref 2.7–4.5)
PLATELET # BLD AUTO: 145 K/UL (ref 150–350)
PMV BLD AUTO: 11.3 FL (ref 9.2–12.9)
POCT GLUCOSE: 102 MG/DL (ref 70–110)
POCT GLUCOSE: 86 MG/DL (ref 70–110)
POTASSIUM SERPL-SCNC: 3.4 MMOL/L (ref 3.5–5.1)
POTASSIUM SERPL-SCNC: 3.6 MMOL/L (ref 3.5–5.1)
PROT SERPL-MCNC: 7.7 G/DL (ref 6–8.4)
PROT UR QL STRIP: NEGATIVE
RBC # BLD AUTO: 4.77 M/UL (ref 4.6–6.2)
SODIUM SERPL-SCNC: 140 MMOL/L (ref 136–145)
SODIUM SERPL-SCNC: 141 MMOL/L (ref 136–145)
SP GR UR STRIP: 1.01 (ref 1–1.03)
TROPONIN I SERPL DL<=0.01 NG/ML-MCNC: 0.02 NG/ML (ref 0–0.03)
URN SPEC COLLECT METH UR: ABNORMAL
UROBILINOGEN UR STRIP-ACNC: ABNORMAL EU/DL
WBC # BLD AUTO: 15.29 K/UL (ref 3.9–12.7)

## 2019-04-08 PROCEDURE — 94761 N-INVAS EAR/PLS OXIMETRY MLT: CPT

## 2019-04-08 PROCEDURE — 36415 COLL VENOUS BLD VENIPUNCTURE: CPT

## 2019-04-08 PROCEDURE — 96361 HYDRATE IV INFUSION ADD-ON: CPT

## 2019-04-08 PROCEDURE — 93005 ELECTROCARDIOGRAM TRACING: CPT

## 2019-04-08 PROCEDURE — 83036 HEMOGLOBIN GLYCOSYLATED A1C: CPT

## 2019-04-08 PROCEDURE — 11000001 HC ACUTE MED/SURG PRIVATE ROOM

## 2019-04-08 PROCEDURE — 63600175 PHARM REV CODE 636 W HCPCS: Performed by: EMERGENCY MEDICINE

## 2019-04-08 PROCEDURE — 84100 ASSAY OF PHOSPHORUS: CPT

## 2019-04-08 PROCEDURE — 82962 GLUCOSE BLOOD TEST: CPT

## 2019-04-08 PROCEDURE — 84484 ASSAY OF TROPONIN QUANT: CPT

## 2019-04-08 PROCEDURE — 96367 TX/PROPH/DG ADDL SEQ IV INF: CPT

## 2019-04-08 PROCEDURE — 93010 EKG 12-LEAD: ICD-10-PCS | Mod: ,,, | Performed by: INTERNAL MEDICINE

## 2019-04-08 PROCEDURE — 87040 BLOOD CULTURE FOR BACTERIA: CPT

## 2019-04-08 PROCEDURE — 25000003 PHARM REV CODE 250: Performed by: EMERGENCY MEDICINE

## 2019-04-08 PROCEDURE — 80053 COMPREHEN METABOLIC PANEL: CPT

## 2019-04-08 PROCEDURE — 93010 ELECTROCARDIOGRAM REPORT: CPT | Mod: 76,,, | Performed by: INTERNAL MEDICINE

## 2019-04-08 PROCEDURE — G0425 PR INPT TELEHEALTH CONSULT 30M: ICD-10-PCS | Mod: GT,G0,, | Performed by: PSYCHIATRY & NEUROLOGY

## 2019-04-08 PROCEDURE — 83605 ASSAY OF LACTIC ACID: CPT

## 2019-04-08 PROCEDURE — 81003 URINALYSIS AUTO W/O SCOPE: CPT

## 2019-04-08 PROCEDURE — 25000003 PHARM REV CODE 250: Performed by: PHYSICIAN ASSISTANT

## 2019-04-08 PROCEDURE — 80048 BASIC METABOLIC PNL TOTAL CA: CPT

## 2019-04-08 PROCEDURE — 96366 THER/PROPH/DIAG IV INF ADDON: CPT

## 2019-04-08 PROCEDURE — 99223 PR INITIAL HOSPITAL CARE,LEVL III: ICD-10-PCS | Mod: AI,,, | Performed by: INTERNAL MEDICINE

## 2019-04-08 PROCEDURE — 99223 1ST HOSP IP/OBS HIGH 75: CPT | Mod: AI,,, | Performed by: INTERNAL MEDICINE

## 2019-04-08 PROCEDURE — 85025 COMPLETE CBC W/AUTO DIFF WBC: CPT

## 2019-04-08 PROCEDURE — 83880 ASSAY OF NATRIURETIC PEPTIDE: CPT

## 2019-04-08 PROCEDURE — 99285 EMERGENCY DEPT VISIT HI MDM: CPT | Mod: 25

## 2019-04-08 PROCEDURE — 96365 THER/PROPH/DIAG IV INF INIT: CPT

## 2019-04-08 PROCEDURE — 25000003 PHARM REV CODE 250: Performed by: INTERNAL MEDICINE

## 2019-04-08 PROCEDURE — G0425 INPT/ED TELECONSULT30: HCPCS | Mod: GT,G0,, | Performed by: PSYCHIATRY & NEUROLOGY

## 2019-04-08 PROCEDURE — 83735 ASSAY OF MAGNESIUM: CPT

## 2019-04-08 PROCEDURE — 93010 ELECTROCARDIOGRAM REPORT: CPT | Mod: ,,, | Performed by: INTERNAL MEDICINE

## 2019-04-08 PROCEDURE — 80061 LIPID PANEL: CPT

## 2019-04-08 RX ORDER — ACETAMINOPHEN 325 MG/1
650 TABLET ORAL EVERY 6 HOURS PRN
Status: DISCONTINUED | OUTPATIENT
Start: 2019-04-08 | End: 2019-04-11 | Stop reason: HOSPADM

## 2019-04-08 RX ORDER — IBUPROFEN 200 MG
16 TABLET ORAL
Status: DISCONTINUED | OUTPATIENT
Start: 2019-04-08 | End: 2019-04-11 | Stop reason: HOSPADM

## 2019-04-08 RX ORDER — ENOXAPARIN SODIUM 100 MG/ML
40 INJECTION SUBCUTANEOUS EVERY 24 HOURS
Status: DISCONTINUED | OUTPATIENT
Start: 2019-04-09 | End: 2019-04-11 | Stop reason: HOSPADM

## 2019-04-08 RX ORDER — ASPIRIN 325 MG
325 TABLET ORAL
Status: COMPLETED | OUTPATIENT
Start: 2019-04-08 | End: 2019-04-08

## 2019-04-08 RX ORDER — SODIUM CHLORIDE 9 MG/ML
1000 INJECTION, SOLUTION INTRAVENOUS
Status: DISCONTINUED | OUTPATIENT
Start: 2019-04-08 | End: 2019-04-08

## 2019-04-08 RX ORDER — ONDANSETRON 4 MG/1
4 TABLET, ORALLY DISINTEGRATING ORAL EVERY 8 HOURS PRN
Status: DISCONTINUED | OUTPATIENT
Start: 2019-04-08 | End: 2019-04-11 | Stop reason: HOSPADM

## 2019-04-08 RX ORDER — IBUPROFEN 200 MG
24 TABLET ORAL
Status: DISCONTINUED | OUTPATIENT
Start: 2019-04-08 | End: 2019-04-11 | Stop reason: HOSPADM

## 2019-04-08 RX ORDER — TAMSULOSIN HYDROCHLORIDE 0.4 MG/1
0.4 CAPSULE ORAL DAILY
Status: DISCONTINUED | OUTPATIENT
Start: 2019-04-09 | End: 2019-04-11 | Stop reason: HOSPADM

## 2019-04-08 RX ORDER — LOSARTAN POTASSIUM 50 MG/1
50 TABLET ORAL DAILY
Status: DISCONTINUED | OUTPATIENT
Start: 2019-04-09 | End: 2019-04-11 | Stop reason: HOSPADM

## 2019-04-08 RX ORDER — INSULIN ASPART 100 [IU]/ML
0-5 INJECTION, SOLUTION INTRAVENOUS; SUBCUTANEOUS
Status: DISCONTINUED | OUTPATIENT
Start: 2019-04-08 | End: 2019-04-11 | Stop reason: HOSPADM

## 2019-04-08 RX ORDER — SODIUM CHLORIDE 9 MG/ML
1000 INJECTION, SOLUTION INTRAVENOUS
Status: COMPLETED | OUTPATIENT
Start: 2019-04-08 | End: 2019-04-08

## 2019-04-08 RX ORDER — RAMELTEON 8 MG/1
8 TABLET ORAL NIGHTLY PRN
Status: DISCONTINUED | OUTPATIENT
Start: 2019-04-08 | End: 2019-04-11 | Stop reason: HOSPADM

## 2019-04-08 RX ORDER — DILTIAZEM HYDROCHLORIDE 120 MG/1
120 CAPSULE, COATED, EXTENDED RELEASE ORAL DAILY
Status: DISCONTINUED | OUTPATIENT
Start: 2019-04-09 | End: 2019-04-11 | Stop reason: HOSPADM

## 2019-04-08 RX ORDER — CLINDAMYCIN HYDROCHLORIDE 150 MG/1
450 CAPSULE ORAL EVERY 8 HOURS
Status: DISCONTINUED | OUTPATIENT
Start: 2019-04-08 | End: 2019-04-11 | Stop reason: HOSPADM

## 2019-04-08 RX ORDER — GLUCAGON 1 MG
1 KIT INJECTION
Status: DISCONTINUED | OUTPATIENT
Start: 2019-04-08 | End: 2019-04-11 | Stop reason: HOSPADM

## 2019-04-08 RX ORDER — CARBIDOPA AND LEVODOPA 25; 100 MG/1; MG/1
2 TABLET ORAL 3 TIMES DAILY
Status: DISCONTINUED | OUTPATIENT
Start: 2019-04-08 | End: 2019-04-11 | Stop reason: HOSPADM

## 2019-04-08 RX ORDER — SODIUM CHLORIDE 0.9 % (FLUSH) 0.9 %
5 SYRINGE (ML) INJECTION
Status: DISCONTINUED | OUTPATIENT
Start: 2019-04-08 | End: 2019-04-11 | Stop reason: HOSPADM

## 2019-04-08 RX ORDER — VANCOMYCIN HCL IN 5 % DEXTROSE 1G/250ML
1000 PLASTIC BAG, INJECTION (ML) INTRAVENOUS
Status: COMPLETED | OUTPATIENT
Start: 2019-04-08 | End: 2019-04-08

## 2019-04-08 RX ORDER — PRAMIPEXOLE DIHYDROCHLORIDE 0.5 MG/1
1 TABLET ORAL 3 TIMES DAILY
Status: DISCONTINUED | OUTPATIENT
Start: 2019-04-08 | End: 2019-04-11 | Stop reason: HOSPADM

## 2019-04-08 RX ORDER — SODIUM CHLORIDE 0.9 % (FLUSH) 0.9 %
10 SYRINGE (ML) INJECTION
Status: CANCELLED | OUTPATIENT
Start: 2019-04-08

## 2019-04-08 RX ORDER — ONDANSETRON 2 MG/ML
4 INJECTION INTRAMUSCULAR; INTRAVENOUS EVERY 8 HOURS PRN
Status: DISCONTINUED | OUTPATIENT
Start: 2019-04-08 | End: 2019-04-11 | Stop reason: HOSPADM

## 2019-04-08 RX ORDER — ENOXAPARIN SODIUM 100 MG/ML
40 INJECTION SUBCUTANEOUS EVERY 24 HOURS
Status: CANCELLED | OUTPATIENT
Start: 2019-04-08

## 2019-04-08 RX ORDER — ASPIRIN 325 MG
325 TABLET, DELAYED RELEASE (ENTERIC COATED) ORAL DAILY
Status: DISCONTINUED | OUTPATIENT
Start: 2019-04-09 | End: 2019-04-11 | Stop reason: HOSPADM

## 2019-04-08 RX ORDER — GABAPENTIN 300 MG/1
300 CAPSULE ORAL 2 TIMES DAILY
Status: DISCONTINUED | OUTPATIENT
Start: 2019-04-08 | End: 2019-04-11 | Stop reason: HOSPADM

## 2019-04-08 RX ADMIN — SODIUM CHLORIDE 1000 ML: 0.9 INJECTION, SOLUTION INTRAVENOUS at 02:04

## 2019-04-08 RX ADMIN — CARBIDOPA AND LEVODOPA 2 TABLET: 25; 100 TABLET ORAL at 09:04

## 2019-04-08 RX ADMIN — CLINDAMYCIN HYDROCHLORIDE 450 MG: 150 CAPSULE ORAL at 11:04

## 2019-04-08 RX ADMIN — SODIUM CHLORIDE 1000 ML: 0.9 INJECTION, SOLUTION INTRAVENOUS at 01:04

## 2019-04-08 RX ADMIN — POTASSIUM BICARBONATE 50 MEQ: 25 TABLET, EFFERVESCENT ORAL at 11:04

## 2019-04-08 RX ADMIN — PRAMIPEXOLE DIHYDROCHLORIDE 1 MG: 0.5 TABLET ORAL at 09:04

## 2019-04-08 RX ADMIN — ASPIRIN 325 MG ORAL TABLET 325 MG: 325 PILL ORAL at 01:04

## 2019-04-08 RX ADMIN — GABAPENTIN 300 MG: 300 CAPSULE ORAL at 09:04

## 2019-04-08 RX ADMIN — VANCOMYCIN HYDROCHLORIDE 1000 MG: 1 INJECTION, POWDER, LYOPHILIZED, FOR SOLUTION INTRAVENOUS at 03:04

## 2019-04-08 RX ADMIN — PIPERACILLIN AND TAZOBACTAM 4.5 G: 4; .5 INJECTION, POWDER, LYOPHILIZED, FOR SOLUTION INTRAVENOUS; PARENTERAL at 02:04

## 2019-04-08 NOTE — ED TRIAGE NOTES
Pt presents to ED via EMS who report syncopal episode and fall witnessed by family, pt denies fall or injury, reports weakness of legs. Pt is difficult to understand with delayed responses and mumbling speech, pt is disoriented to time and events. Son arrived at bedside and reports he picked pt up around 8:30am and noticed some change in speech and mild confusion, reports worsening of confusion and weakness with pt having difficulty walking with walker. Son states pt is usually oriented x 4 and knows at least the month and year. Son denies any known falls or syncopal episodes. Pt reports redness and swelling to BLE for 2 days, son states it started approx 1 week ago with recent worsening. Pt denies recent fever, cough, N/V/D, CP, SOB. PMHx atrial fibrillation, DM, HTN, parkinsons disease.

## 2019-04-08 NOTE — ASSESSMENT & PLAN NOTE
- ?TIA as etiology of his acute confusion; lower suspicion for infectious process, though cellulitis / toxic encephalopathy possible.  - HgbA1c, lipid panel, U/S carotids, repeat TTE to further evaluate. s/p ICD, unable to obtain MRI.  - Continue aspirin 325mg PO daily.

## 2019-04-08 NOTE — HPI
Mr. Parmar is an 88/M with PMH HTN, HLD, DMII, A-Fib, Parkinson's who presented to ED 04/08 with a several hour history of confusion. History was obtained from the patient's family members as he has some confusion at baseline. They report that he was slightly confused upon being picked up from [facility] for lunch. His son had stated to ED staff that the patient had difficulty climbing stairs, which was new for him, as well as unsteady gait. They deny any fever, chills, SOB, cough, CP, palpitations. They do note some increased BLE swelling and mild erythema of the LLE. They had no other concerns at time of interview.

## 2019-04-08 NOTE — ED NOTES
Marissa Parmar, daughter, to receive updates on care  [phone]    Jesus Parmar, son, currently at bedside but states he needs to leave and requests updates on care  [phone]

## 2019-04-08 NOTE — ED NOTES
Discussed fluids with MD given lab work results. MD states to cancel second liter of NS, OK for remainder of 1st liter to infuse after ABX complete.

## 2019-04-08 NOTE — SUBJECTIVE & OBJECTIVE
Past Medical History:   Diagnosis Date    *Atrial fibrillation     AI (aortic insufficiency)     mild    Atrial fibrillation     Diabetes mellitus     Diabetes mellitus, type 2     Hyperlipidemia     Hypertension     Memory loss     Parkinson disease     Spinal stenosis memory loss     Past Surgical History:   Procedure Laterality Date    CHOLECYSTECTOMY       Review of patient's allergies indicates:   Allergen Reactions    Demerol [meperidine]      No current facility-administered medications on file prior to encounter.      Current Outpatient Medications on File Prior to Encounter   Medication Sig    aspirin (ECOTRIN) 325 MG EC tablet Take 1 tablet (325 mg total) by mouth once daily.    carbidopa-levodopa  mg (SINEMET)  mg per tablet Take 2 tablets by mouth 3 (three) times daily.     cholecalciferol, vitamin D3, (VITAMIN D3) 4,000 unit Cap Take 1 capsule by mouth once daily.     diltiaZEM (CARDIZEM CD) 120 MG Cp24 TAKE ONE CAPSULE BY MOUTH ONCE DAILY    furosemide (LASIX) 20 MG tablet TAKE 1 TABLET BY MOUTH EVERY DAY    gabapentin (NEURONTIN) 300 MG capsule Take 1 capsule (300 mg total) by mouth 2 (two) times daily.    losartan (COZAAR) 50 MG tablet TAKE 1 TABLET(50 MG) BY MOUTH EVERY DAY    pramipexole (MIRAPEX) 1 MG tablet Take 1 tablet by mouth 3 (three) times daily.    SITagliptan (JANUVIA) 50 MG Tab Take 1 tablet (50 mg total) by mouth once daily.    tamsulosin (FLOMAX) 0.4 mg Cp24 Take 1 capsule (0.4 mg total) by mouth once daily.    acetaminophen (TYLENOL) 325 MG tablet Take 2 tablets (650 mg total) by mouth every 4 (four) hours as needed.    esomeprazole (NEXIUM) 40 MG capsule Take 22.3 mg by mouth before breakfast.     fluticasone (FLONASE) 50 mcg/actuation nasal spray 1 spray (50 mcg total) by Each Nare route once daily.    FOLIC ACID/MULTIVITS-MIN/LUT (CENTRUM SILVER ORAL) Take 1 tablet by mouth once daily.    GENERLAC 10 gram/15 mL solution Take 10 g by mouth  "daily as needed.     guaifenesin 100 mg/5 ml (ROBITUSSIN) 100 mg/5 mL syrup Take 200 mg by mouth every 4 (four) hours as needed for Cough.     Family History     None        Tobacco Use    Smoking status: Never Smoker    Smokeless tobacco: Never Used   Substance and Sexual Activity    Alcohol use: No    Drug use: No    Sexual activity: Never     Review of Systems   Constitutional: Negative for chills and fever.   HENT: Negative for sore throat and trouble swallowing.    Eyes: Negative for pain and visual disturbance.   Respiratory: Negative for cough and shortness of breath.    Cardiovascular: Negative for chest pain and palpitations.   Gastrointestinal: Negative for abdominal pain, nausea and vomiting.   Genitourinary: Negative for difficulty urinating and dysuria.   Musculoskeletal: Negative for arthralgias and joint swelling.   Skin: Negative for rash and wound.   Neurological: Negative for weakness and numbness.   Psychiatric/Behavioral: Positive for confusion.     Objective:     Vital Signs (Most Recent):  Temp: 98 °F (36.7 °C) (04/08/19 1739)  Pulse: 72 (04/08/19 1735)  Resp: (!) 24 (04/08/19 1731)  BP: (!) 143/80 (04/08/19 1735)  SpO2: 95 % (04/08/19 1735) Vital Signs (24h Range):  Temp:  [98 °F (36.7 °C)-99.2 °F (37.3 °C)] 98 °F (36.7 °C)  Pulse:  [68-98] 72  Resp:  [15-26] 24  SpO2:  [95 %-98 %] 95 %  BP: (136-160)/(80-95) 143/80     Weight: 79.4 kg (175 lb)  Body mass index is 27.41 kg/m².    Physical Exam   Constitutional: He appears well-developed. No distress.   HENT:   Head: Normocephalic and atraumatic.   Eyes: Conjunctivae and EOM are normal.   Cardiovascular: Normal rate and intact distal pulses.   Pulmonary/Chest: Effort normal. No respiratory distress.   Abdominal: Soft. There is no tenderness.   Musculoskeletal: Normal range of motion. He exhibits edema (3+ BLE; minimal erythema of LLE with trace warmth).   Neurological: He is alert.   Oriented x 2 (self, "hospital")   Skin: Skin is warm " and dry.   Nursing note and vitals reviewed.       Significant Labs:   CBC:  Recent Labs   Lab 04/08/19  1324   WBC 15.29*   HGB 15.4   HCT 46.1   *   GRAN 88.4*  13.5*   LYMPH 3.3*  0.5*   MONO 7.2  1.1*   EOS 0.1   BASO 0.02      CMP:  Recent Labs   Lab 04/08/19  1324      K 3.6      CO2 28   BUN 20   CREATININE 1.3   GLU 90   CALCIUM 9.9   ALKPHOS 111   AST 25   ALT 15   BILITOT 1.9*   PROT 7.7   ALBUMIN 4.0   ANIONGAP 10     UA:  Recent Labs   Lab 04/08/19  1325   PHUR 8.0   SPECGRAV 1.015   PROTEINUA Negative   GLUCUA Negative   KETONESU Negative   BILIRUBINUA Negative   NITRITE Negative     Recent Labs   Lab 04/08/19  1324   *   LACTATE 1.1     Significant Imaging:   CXR 04/08/19:  Findings most consistent with a mild degree of congestive failure.  Some patchy increased markings in the left midlung field nonspecific.  Correlation with clinical findings would be helpful.    CT Head WO Contrast 04/08/19:  No convincing acute intracranial abnormalities noting motion limited examination. Grossly stable sequela of chronic microvascular ischemic change, senescent change, and multifocal basal ganglia lacunar infarcts.    U/S BLE 04/08/19:  No evidence of deep venous thrombosis in either lower extremity. Bilateral lower extremity subcutaneous edema.

## 2019-04-08 NOTE — ASSESSMENT & PLAN NOTE
- Mild erythema of LLE with mildly increased warmth.  - Received piperacillin-tazobactam, vancomycin in ED.  - Leukocytosis with WBCs 15; CXR with some pulmonary vascular congestion but no clear evidence of PNA, UA unremarkable, no symptoms to suggest GI infection.  - Continue antibiotic therapy with clindamycin 450mg PO q8hr.

## 2019-04-08 NOTE — H&P
[facility] Hospital Medicine  History & Physical    Patient Name: Silvano Parmar  MRN: [id number]  Admission Date: 4/8/2019  Attending Physician: JIMY Adame MD  Primary Care Provider: Silvestre Ramirez MD    Patient information was obtained from patient, relative(s), past medical records and ER records.     Subjective:     Principal Problem:TIA (transient ischemic attack)    Chief Complaint:   Chief Complaint   Patient presents with    Loss of Consciousness     one episode with incontinence, witnessed by son, brought by [facility]. son reports slurred speech prior to syncope that has resolved        HPI: Mr. Parmar is an 88/M with PMH HTN, HLD, DMII, A-Fib, Parkinson's who presented to ED 04/08 with a several hour history of confusion. History was obtained from the patient's family members as he has some confusion at baseline. They report that he was slightly confused upon being picked up from [facility] for lunch. His son had stated to ED staff that the patient had difficulty climbing stairs, which was new for him, as well as unsteady gait. They deny any fever, chills, SOB, cough, CP, palpitations. They do note some increased BLE swelling and mild erythema of the LLE. They had no other concerns at time of interview.    Past Medical History:   Diagnosis Date    *Atrial fibrillation     AI (aortic insufficiency)     mild    Atrial fibrillation     Diabetes mellitus     Diabetes mellitus, type 2     Hyperlipidemia     Hypertension     Memory loss     Parkinson disease     Spinal stenosis memory loss     Past Surgical History:   Procedure Laterality Date    CHOLECYSTECTOMY       Review of patient's allergies indicates:   Allergen Reactions    Demerol [meperidine]      No current facility-administered medications on file prior to encounter.      Current Outpatient Medications on File Prior to Encounter   Medication Sig    aspirin (ECOTRIN) 325 MG EC tablet Take 1 tablet  (325 mg total) by mouth once daily.    carbidopa-levodopa  mg (SINEMET)  mg per tablet Take 2 tablets by mouth 3 (three) times daily.     cholecalciferol, vitamin D3, (VITAMIN D3) 4,000 unit Cap Take 1 capsule by mouth once daily.     diltiaZEM (CARDIZEM CD) 120 MG Cp24 TAKE ONE CAPSULE BY MOUTH ONCE DAILY    furosemide (LASIX) 20 MG tablet TAKE 1 TABLET BY MOUTH EVERY DAY    gabapentin (NEURONTIN) 300 MG capsule Take 1 capsule (300 mg total) by mouth 2 (two) times daily.    losartan (COZAAR) 50 MG tablet TAKE 1 TABLET(50 MG) BY MOUTH EVERY DAY    pramipexole (MIRAPEX) 1 MG tablet Take 1 tablet by mouth 3 (three) times daily.    SITagliptan (JANUVIA) 50 MG Tab Take 1 tablet (50 mg total) by mouth once daily.    tamsulosin (FLOMAX) 0.4 mg Cp24 Take 1 capsule (0.4 mg total) by mouth once daily.    acetaminophen (TYLENOL) 325 MG tablet Take 2 tablets (650 mg total) by mouth every 4 (four) hours as needed.    esomeprazole (NEXIUM) 40 MG capsule Take 22.3 mg by mouth before breakfast.     fluticasone (FLONASE) 50 mcg/actuation nasal spray 1 spray (50 mcg total) by Each Nare route once daily.    FOLIC ACID/MULTIVITS-MIN/LUT (CENTRUM SILVER ORAL) Take 1 tablet by mouth once daily.    GENERLAC 10 gram/15 mL solution Take 10 g by mouth daily as needed.     guaifenesin 100 mg/5 ml (ROBITUSSIN) 100 mg/5 mL syrup Take 200 mg by mouth every 4 (four) hours as needed for Cough.     Family History     None        Tobacco Use    Smoking status: Never Smoker    Smokeless tobacco: Never Used   Substance and Sexual Activity    Alcohol use: No    Drug use: No    Sexual activity: Never     Review of Systems   Constitutional: Negative for chills and fever.   HENT: Negative for sore throat and trouble swallowing.    Eyes: Negative for pain and visual disturbance.   Respiratory: Negative for cough and shortness of breath.    Cardiovascular: Negative for chest pain and palpitations.   Gastrointestinal:  "Negative for abdominal pain, nausea and vomiting.   Genitourinary: Negative for difficulty urinating and dysuria.   Musculoskeletal: Negative for arthralgias and joint swelling.   Skin: Negative for rash and wound.   Neurological: Negative for weakness and numbness.   Psychiatric/Behavioral: Positive for confusion.     Objective:     Vital Signs (Most Recent):  Temp: 98 °F (36.7 °C) (04/08/19 1739)  Pulse: 72 (04/08/19 1735)  Resp: (!) 24 (04/08/19 1731)  BP: (!) 143/80 (04/08/19 1735)  SpO2: 95 % (04/08/19 1735) Vital Signs (24h Range):  Temp:  [98 °F (36.7 °C)-99.2 °F (37.3 °C)] 98 °F (36.7 °C)  Pulse:  [68-98] 72  Resp:  [15-26] 24  SpO2:  [95 %-98 %] 95 %  BP: (136-160)/(80-95) 143/80     Weight: 79.4 kg (175 lb)  Body mass index is 27.41 kg/m².    Physical Exam   Constitutional: He appears well-developed. No distress.   HENT:   Head: Normocephalic and atraumatic.   Eyes: Conjunctivae and EOM are normal.   Cardiovascular: Normal rate and intact distal pulses.   Pulmonary/Chest: Effort normal. No respiratory distress.   Abdominal: Soft. There is no tenderness.   Musculoskeletal: Normal range of motion. He exhibits edema (3+ BLE; minimal erythema of LLE with trace warmth).   Neurological: He is alert.   Oriented x 2 (self, "hospital")   Skin: Skin is warm and dry.   Nursing note and vitals reviewed.       Significant Labs:   CBC:  Recent Labs   Lab 04/08/19  1324   WBC 15.29*   HGB 15.4   HCT 46.1   *   GRAN 88.4*  13.5*   LYMPH 3.3*  0.5*   MONO 7.2  1.1*   EOS 0.1   BASO 0.02      CMP:  Recent Labs   Lab 04/08/19  1324      K 3.6      CO2 28   BUN 20   CREATININE 1.3   GLU 90   CALCIUM 9.9   ALKPHOS 111   AST 25   ALT 15   BILITOT 1.9*   PROT 7.7   ALBUMIN 4.0   ANIONGAP 10     UA:  Recent Labs   Lab 04/08/19  1325   PHUR 8.0   SPECGRAV 1.015   PROTEINUA Negative   GLUCUA Negative   KETONESU Negative   BILIRUBINUA Negative   NITRITE Negative     Recent Labs   Lab 04/08/19  1324   * "   LACTATE 1.1     Significant Imaging:   CXR 04/08/19:  Findings most consistent with a mild degree of congestive failure.  Some patchy increased markings in the left midlung field nonspecific.  Correlation with clinical findings would be helpful.    CT Head WO Contrast 04/08/19:  No convincing acute intracranial abnormalities noting motion limited examination. Grossly stable sequela of chronic microvascular ischemic change, senescent change, and multifocal basal ganglia lacunar infarcts.    U/S BLE 04/08/19:  No evidence of deep venous thrombosis in either lower extremity. Bilateral lower extremity subcutaneous edema.    Assessment/Plan:     * TIA (transient ischemic attack)  - ?TIA as etiology of his acute confusion; lower suspicion for infectious process, though cellulitis / toxic encephalopathy possible.  - HgbA1c, lipid panel, U/S carotids, repeat TTE to further evaluate. s/p ICD, unable to obtain MRI.  - Continue aspirin 325mg PO daily.    Cellulitis  - Mild erythema of LLE with mildly increased warmth.  - Received piperacillin-tazobactam, vancomycin in ED.  - Leukocytosis with WBCs 15; CXR with some pulmonary vascular congestion but no clear evidence of PNA, UA unremarkable, no symptoms to suggest GI infection.  - Continue antibiotic therapy with clindamycin 450mg PO q8hr.    Benign prostatic hyperplasia  - Continuing tamsulosin 0.4mg PO daily.    Essential hypertension  - Continuing diltiazem 120mg PO daily, losartan 50mg PO daily.    Type 2 diabetes mellitus, without long-term current use of insulin  - Check HgbA1c given ?TIA.  - Start low-dose sliding scale insulin aspart 0-5U subQ TIDWM PRN.    Hyperlipemia  - Last LDL 2017 <90.  - Repeat lipid panel and consider statin initiation.    Parkinson disease  - Continuing carbidopa-levopopa 25-100mg PO TID, pramipexole 1mg PO TID.    Atrial fibrillation  - No anticoagulation as outpatient given frequent fall history.  - Risk factor for stroke.      VTE Risk  Mitigation (From admission, onward)        Ordered     enoxaparin injection 40 mg  Daily      04/08/19 1807     IP VTE HIGH RISK PATIENT  Once      04/08/19 1807             D Tucker Adame MD  Department of Hospital Medicine   [facility]

## 2019-04-08 NOTE — CONSULTS
[facility]  Vascular Neurology  Comprehensive Stroke Center  Tele-Consultation Note      Consults    Consulting Provider: Spoke Physician:: Char Jonas  Current Providers  No providers found    Patient Location: [facility] unit.  Spoke hospital nurse at bedside with patient assisting consultant.     Patient information was obtained from ICU NURSE.       Assessment/Plan:   9 y/o with HTN, DM, HLD, PD, admitted to the hospital due to increasing confusion since yesterday as well as reported slurred speech by son.  NIHSS 1, CTH without acute abnormality.  Suspect increasing confusion due to likely toxic encephalopathy in this gentleman with underlying PD. Nonetheless, he has multiple risk factors for stroke and can not entirely exclude an acute or subacute neurovascular insult.  MRI brain recommended. Neuro consult if MRI confirms stroke.          STROKE DOCUMENTATION     Acute Stroke Times:   Acute Stroke Times   Last Known Normal Date: 04/07/19  Last Known Normal Time: (unknown)  Symptom Onset Date: 04/07/19  Symptom Onset Time: (unknown)  Stroke Team Called Date: 04/08/19  Stroke Team Called Time: 1451  Stroke Team Arrival Date: 04/08/19  Stroke Team Arrival Time: 1503  CT Interpretation Time: 1503  Decision to Treat Time for Alteplase: (No iv altepalse)  Decision to Treat Time for IR: (No IR candidate)    NIH Scale:  1a. Level of Consciousness: 0-->Alert, keenly responsive  1b. LOC Questions: 1-->Answers one question correctly  1c. LOC Commands: 0-->Performs both tasks correctly  2. Best Gaze: 0-->Normal  3. Visual: 0-->No visual loss  4. Facial Palsy: 0-->Normal symmetrical movements  5a. Motor Arm, Left: 0-->No drift, limb holds 90 (or 45) degrees for full 10 secs  5b. Motor Arm, Right: 0-->No drift, limb holds 90 (or 45) degrees for full 10 secs  6a. Motor Leg, Left: 0-->No drift, leg holds 30 degree position for full 5 secs  6b. Motor Leg, Right: 0-->No  "drift, leg holds 30 degree position for full 5 secs  7. Limb Ataxia: 0-->Absent  8. Sensory: 0-->Normal, no sensory loss  9. Best Language: 0-->No aphasia, normal  10. Dysarthria: 0-->Normal  11. Extinction and Inattention (formerly Neglect): 0-->No abnormality  Total (NIH Stroke Scale): 1     Modified [place] Score: 2  Andreina Coma Scale:14   ABCD2 Score:    [id number] Score:   HAS -BLED Score:   ICH Score:   Hunt & Dixon Classification:       Diagnoses: confusion. Slurred speech.      Blood pressure 136/84, pulse 72, temperature 99.2 °F (37.3 °C), temperature source Oral, resp. rate 15, height 5' 7" (1.702 m), weight 79.4 kg (175 lb), SpO2 98 %.  Alteplase Eligible?: No  Alteplase Recommendation: Alteplase not recommended due to Outside of treatment window  and Suspected stroke mimic   Possible Interventional Revascularization Candidate? No; No significant neurological deficit    Disposition Recommendation: do not transfer      Subjective:     History of Present Illness: 89 y/o with HTN, DM, HLD, PD, admitted to the hospital due to increasing confusion since yesterday as well as reported slurred speech by son.  Of note, noted to have swelling and redness bilateral LE and some elevation wbc.                No notes on file      Woke up with symptoms?: no  Last known normal:        Recent bleeding noted: no  Does the patient take any Blood Thinners? no  Medications: Antiplatelets:  none and Anticoagulants:  none      Past Medical History: hypertension, diabetes, hyperlipidemia and PD    Past Surgical History: no major surgeries within the last 2 weeks    Family History: no relevant history    Social History: no smoking, no drinking, no drugs    Allergies: Demerol [Meperidine] No known drug allergies    Review of Systems   Unable to perform ROS: Mental status change     Objective:   Vitals: Blood pressure 136/84, pulse 72, temperature 99.2 °F (37.3 °C), temperature source Oral, resp. rate 15, height 5' 7" (1.702 m), " weight 79.4 kg (175 lb), SpO2 98 %. BP: 140/777, Respiratory Rate: 16 and Heart Rate: 77    CT READ: Yes  No hemmorhage. No mass effect. No early infarct signs.     Physical Exam   Constitutional: He appears well-developed and well-nourished.   HENT:   Head: Normocephalic.   Eyes: Pupils are equal, round, and reactive to light. EOM are normal.   Neck: Normal range of motion. Neck supple.   Cardiovascular: Normal rate and regular rhythm.   Pulmonary/Chest: No respiratory distress.   Abdominal: He exhibits no mass.   Genitourinary:   Genitourinary Comments: No performed   Musculoskeletal: Normal range of motion. He exhibits edema.   Neurological: He is alert. No cranial nerve deficit or sensory deficit. Coordination normal.   Disoriented to year     Skin: He is not diaphoretic. There is erythema.   Psychiatric: He has a normal mood and affect.   Nursing note and vitals reviewed.            Recommended the emergency room physician to have a brief discussion with the patient and/or family if available regarding the risks and benefits of treatment, and to briefly document the occurrence of that discussion in his clinical encounter note.     The attending portion of this evaluation, treatment, and documentation was performed per Jesu Dao MD via audiovisual.    Billing code:  (non-intervention mild to moderate stroke, TIA, some mimics)    · This patient has a critical neurological condition/illness, with some potential for high morbidity and mortality.  · There is a moderate probability for acute neurological change leading to clinical and possibly life-threatening deterioration requiring highest level of physician preparedness for urgent intervention.  · Care was coordinated with other physicians involved in the patient's care.  · Radiologic studies and laboratory data were reviewed and interpreted, and plan of care was re-assessed based on the results.  · Diagnosis, treatment options and prognosis may have  been discussed with the patient and/or family members or caregiver.      In your opinion, this was a: Tier 2    Consult End Time: 3:17 PM    Jesu Dao MD  [facility]  Vascular Neurology   [facility]

## 2019-04-08 NOTE — ED PROVIDER NOTES
"Encounter Date: 4/8/2019    SCRIBE #1 NOTE: I, Daxa Quiroga, am scribing for, and in the presence of, Dr. Jonas.       History     Chief Complaint   Patient presents with    Loss of Consciousness     one episode with incontinence, witnessed by son, brought by [facility]. son reports slurred speech prior to syncope that has resolved     Time seen by provider: 1:06 PM    This is a 88 y.o. male with hx of HTN, HLD, DM, A-Fib, and Parkinson Disease who presents via EMS due to confusion today. Per son at bedside, pt was confused with slurred speech when he picked him up from [facility] this morning, approximately four hours ago. He states that son had difficulty climbing flight of stairs he had no issues with in the past. Pt also urinated on himself when using the restroom, son states "I think it was because he couldn't get his pants off fast enough." Pt does wear Depends at baseline. Son also noted the pt's legs were shaking as he was walking, BLE swelling beyond baseline, and chronic redness to the BLE. When pt was returned to [facility], nurse on staff stated that pt was behaving very differently than he had been in the morning. Pt and son deny any fall today, despite [facility] ambulance report that son had witnessed fall. Pt denies any pain. He has no fever, chills, N/V/D, abdominal pain, chest pain, SOB, dizziness, numbness, HA, back pain, neck pain, or wound.    The history is provided by the patient.     Review of patient's allergies indicates:   Allergen Reactions    Demerol [meperidine]      Past Medical History:   Diagnosis Date    *Atrial fibrillation     AI (aortic insufficiency)     mild    Atrial fibrillation     Diabetes mellitus     Diabetes mellitus, type 2     Hyperlipidemia     Hypertension     Memory loss     Parkinson disease     Spinal stenosis memory loss     Past Surgical History:   Procedure Laterality Date    CHOLECYSTECTOMY       History reviewed. No pertinent " family history.  Social History     Tobacco Use    Smoking status: Never Smoker    Smokeless tobacco: Never Used   Substance Use Topics    Alcohol use: No    Drug use: No     Review of Systems   Constitutional: Negative for chills and fever.   HENT: Negative for congestion, rhinorrhea and sore throat.    Eyes: Negative for visual disturbance.   Respiratory: Negative for cough and shortness of breath.    Cardiovascular: Positive for leg swelling (chronic, beyond baseline). Negative for chest pain and palpitations.   Gastrointestinal: Negative for abdominal pain, diarrhea, nausea and vomiting.   Endocrine: Negative for polyuria.   Genitourinary: Negative for decreased urine volume and dysuria.   Musculoskeletal: Negative for arthralgias, back pain and neck pain.   Skin: Positive for color change (redness to BLE). Negative for rash and wound.   Allergic/Immunologic: Negative for immunocompromised state.   Neurological: Positive for speech difficulty. Negative for dizziness, weakness, light-headedness and headaches.   Hematological: Does not bruise/bleed easily.   Psychiatric/Behavioral: Positive for confusion.       Physical Exam     Initial Vitals [04/08/19 1233]   BP Pulse Resp Temp SpO2   (!) 148/95 98 15 98.8 °F (37.1 °C) 98 %      MAP       --         Physical Exam    Nursing note and vitals reviewed.  Constitutional: He appears well-developed and well-nourished. No distress.   HENT:   Head: Normocephalic and atraumatic.   Right Ear: External ear normal.   Left Ear: External ear normal.   Eyes: Right eye exhibits no discharge. Left eye exhibits no discharge.   Neck: Normal range of motion. Neck supple.   Cardiovascular: Normal rate, regular rhythm, normal heart sounds and intact distal pulses.   Good palpable pulses to BLE.    Pulmonary/Chest: Breath sounds normal. No respiratory distress.   Abdominal: Soft. Bowel sounds are normal. He exhibits no distension. There is no tenderness.   Musculoskeletal: Normal  range of motion.   L calf larger than right with erythema and warmth-to-touch.    Lymphadenopathy:     He has no cervical adenopathy.   Neurological: He is alert and oriented to person, place, and time. He has normal strength. No cranial nerve deficit or sensory deficit. GCS eye subscore is 4. GCS verbal subscore is 4. GCS motor subscore is 6.   Skin: Skin is warm and dry. No rash noted. No erythema.   Psychiatric: He has a normal mood and affect. His behavior is normal.         ED Course   Procedures  Labs Reviewed   CBC W/ AUTO DIFFERENTIAL - Abnormal; Notable for the following components:       Result Value    WBC 15.29 (*)     MCH 32.3 (*)     RDW 14.6 (*)     Platelets 145 (*)     Gran # (ANC) 13.5 (*)     Lymph # 0.5 (*)     Mono # 1.1 (*)     Gran% 88.4 (*)     Lymph% 3.3 (*)     All other components within normal limits   COMPREHENSIVE METABOLIC PANEL - Abnormal; Notable for the following components:    Total Bilirubin 1.9 (*)     eGFR if  56 (*)     eGFR if non  49 (*)     All other components within normal limits   B-TYPE NATRIURETIC PEPTIDE - Abnormal; Notable for the following components:     (*)     All other components within normal limits   URINALYSIS, REFLEX TO URINE CULTURE - Abnormal; Notable for the following components:    Urobilinogen, UA 4.0-6.0 (*)     All other components within normal limits    Narrative:     Preferred Collection Type->Urine, Clean Catch   TROPONIN I   LACTIC ACID, PLASMA   POCT GLUCOSE     EKG Readings: (Independently Interpreted)   A-Fib with paced rhythm at a rate of 88. LAD. QTc 426. T wave inversion in V3-V6. Changed from prior November 2018       Imaging Results          US Carotid Bilateral (Final result)  Result time 04/08/19 19:53:56    Final result by aMrcelo Simpson MD (04/08/19 19:53:56)                 Impression:      No evidence of a hemodynamically significant carotid bifurcation stenosis.      Electronically signed  by: Marcelo Simpson MD  Date:    04/08/2019  Time:    19:53             Narrative:    EXAMINATION:  US CAROTID BILATERAL    CLINICAL HISTORY:  ?TIA, eval for stenosis;    TECHNIQUE:  Grayscale and color Doppler ultrasound examination of the carotid and vertebral artery systems bilaterally.  Stenosis estimates are per the NASCET measurement criteria.    COMPARISON:  None.    FINDINGS:  Right:    Internal Carotid Artery (ICA) peak systolic velocity 41 cm/sec    ICA/CCA peak systolic velocity ratio: 1.1    Plaque formation: There is mild amount of heterogeneous plaque within the right carotid system.    Vertebral artery: Antegrade flow and normal waveform.    Left:    Internal Carotid Artery (ICA)  peak systolic velocity 55 cm/sec    ICA/CCA peak systolic velocity ratio: 1.2    Plaque formation: There is mild amount of heterogeneous plaque within the left carotid system.    Vertebral artery: Antegrade flow and normal waveform.                               US Lower Extremity Veins Bilateral (Final result)  Result time 04/08/19 15:37:09    Final result by Jordan Rojas MD (04/08/19 15:37:09)                 Impression:      No evidence of deep venous thrombosis in either lower extremity.    Bilateral lower extremity subcutaneous edema.      Electronically signed by: Jordan Rojas MD  Date:    04/08/2019  Time:    15:37             Narrative:    EXAMINATION:  US LOWER EXTREMITY VEINS BILATERAL    CLINICAL HISTORY:  Pain in left leg    TECHNIQUE:  Duplex and color flow Doppler and dynamic compression was performed of the bilateral lower extremity veins was performed.    COMPARISON:  None    FINDINGS:  Duplex and color flow Doppler evaluation does not reveal any evidence of acute venous thrombosis in the common femoral, superficial femoral, greater saphenous, popliteal, peroneal, anterior tibial and posterior tibial veins bilaterally.  There is no reflux to suggest valvular incompetence.  There is bilateral lower  extremity subcutaneous edema.                               CT Head Without Contrast (Final result)  Result time 04/08/19 14:06:16    Final result by Jordan Rojas MD (04/08/19 14:06:16)                 Impression:      1. No convincing acute intracranial abnormalities noting motion limited examination.  2. Grossly stable sequela of chronic microvascular ischemic change, senescent change, and multifocal basal ganglia lacunar infarcts.      Electronically signed by: Jordan Rojas MD  Date:    04/08/2019  Time:    14:06             Narrative:    EXAMINATION:  CT HEAD WITHOUT CONTRAST    CLINICAL HISTORY:  Syncope/fainting;loc;    TECHNIQUE:  Low dose axial images were obtained through the head.  Coronal and sagittal reformations were also performed. Contrast was not administered.    COMPARISON:  12/04/2018    FINDINGS:  Examination is limited secondary to patient motion    There is generalized cerebral volume loss.  There is hypoattenuation in a periventricular fashion, likely sequela of chronic microvascular ischemic change.There are scattered punctate foci of low attenuation within the basal ganglia bilaterally, grossly stable allowing for motion suggesting sequela of remote lacunar infarcts.  There is no evidence of acute major vascular territory infarct, hemorrhage, or mass.  There is no hydrocephalus.  There are no abnormal extra-axial fluid collections.  The paranasal sinuses and mastoid air cells are clear, and there is no evidence of calvarial fracture.  The visualized soft tissues are unremarkable.                               X-Ray Chest AP Portable (Final result)  Result time 04/08/19 13:58:40    Final result by Grey Woodruff Jr., MD (04/08/19 13:58:40)                 Impression:      Findings most consistent with a mild degree of congestive failure.  Some patchy increased markings in the left midlung field nonspecific.  Correlation with clinical findings would be helpful.      Electronically  signed by: Grey Woodruff MD  Date:    04/08/2019  Time:    13:58             Narrative:    EXAMINATION:  XR CHEST AP PORTABLE    CLINICAL HISTORY:  Chest Pain;    TECHNIQUE:  Single frontal view of the chest was performed.    COMPARISON:  November 2018.    FINDINGS:  Cardiac pacemaker and single lead noted.  Monitoring leads in place.  Heart size is similar.  Mild increase in the pulmonary vascular and interstitial markings.  Few patchy increased markings in the left midlung field.  No confluent consolidation.  Increased soft tissue right paratracheal region presumably tortuous great vessels similar dating to January 2018.                              X-Rays:   Independently Interpreted Readings:   Chest X-Ray: Infiltrate in the L lower lobe.     Medical Decision Making:   Differential Diagnosis:   Electrolyte abnormality, UTI, metabolic derangement, CVA, TIA  Independently Interpreted Test(s):   I have ordered and independently interpreted X-rays - see prior notes.  I have ordered and independently interpreted EKG Reading(s) - see prior notes  Clinical Tests:   Lab Tests: Ordered and Reviewed  Radiological Study: Ordered and Reviewed  Medical Tests: Ordered and Reviewed  Sepsis Perfusion Assessment: I attest, a sepsis perfusion exam was performed within 6 hours of Septic Shock presentation, following fluid resuscitation.  ED Management:  This is an emergent evaluation of a 88-year-old white male past medical history Parkinson, hypertension, hyperlipidemia, diabetes, AFib presenting with complaint of confusion, slurred speech and leg weakness. On physical exam he is nontoxic, afebrile, has no focal neurologic deficits and GCS of 14 with cellulitis of the left lower extremity.  Patient has been given vanc and Zosyn.  CT head without acute abnormalities, chest x-ray with possible left lobe pneumonia.      2:45 PM - Spoke with Dr. Adame regarding pt and reassessed pt with son at bedside. He is found to have a  reoccurrence of his slurred speech without other focal neruo deficits. Paged TeleStroke as sx could be secondary to TIA.    2:52 PM - Called Dr. Joseph. Awaiting for input.    3:20 PM - Spoke with Dr. Joseph. Requested MRI of the head.  Cannot complete MRI as patient has pacemaker.  Case discussed with Dr. Adame, he has been admitted for further workup    Other:   I have discussed this case with another health care provider.            Scribe Attestation:   Scribe #1: I performed the above scribed service and the documentation accurately describes the services I performed. I attest to the accuracy of the note.    Attending Attestation:           Physician Attestation for Scribe:  Physician Attestation Statement for Scribe #1: I, Dr. Jonas, reviewed documentation, as scribed by Daxa Quiroga in my presence, and it is both accurate and complete.                    Clinical Impression:     1. Cellulitis, unspecified cellulitis site    2. LOC (loss of consciousness)    3. Left leg pain    4. TIA (transient ischemic attack)    5. Weakness                                 Char Jonas MD  04/08/19 2047

## 2019-04-08 NOTE — ED NOTES
Telemedicine machine at bedside with speaker volume turned up and logged into conferencing system.

## 2019-04-08 NOTE — SUBJECTIVE & OBJECTIVE
"  Woke up with symptoms?: no  Last known normal:        Recent bleeding noted: no  Does the patient take any Blood Thinners? no  Medications: Antiplatelets:  none and Anticoagulants:  none      Past Medical History: hypertension, diabetes, hyperlipidemia and PD    Past Surgical History: no major surgeries within the last 2 weeks    Family History: no relevant history    Social History: no smoking, no drinking, no drugs    Allergies: Demerol [Meperidine] No known drug allergies    Review of Systems   Unable to perform ROS: Mental status change     Objective:   Vitals: Blood pressure 136/84, pulse 72, temperature 99.2 °F (37.3 °C), temperature source Oral, resp. rate 15, height 5' 7" (1.702 m), weight 79.4 kg (175 lb), SpO2 98 %. BP: 140/777, Respiratory Rate: 16 and Heart Rate: 77    CT READ: Yes  No hemmorhage. No mass effect. No early infarct signs.     Physical Exam   Constitutional: He appears well-developed and well-nourished.   HENT:   Head: Normocephalic.   Eyes: Pupils are equal, round, and reactive to light. EOM are normal.   Neck: Normal range of motion. Neck supple.   Cardiovascular: Normal rate and regular rhythm.   Pulmonary/Chest: No respiratory distress.   Abdominal: He exhibits no mass.   Genitourinary:   Genitourinary Comments: No performed   Musculoskeletal: Normal range of motion. He exhibits edema.   Neurological: He is alert. No cranial nerve deficit or sensory deficit. Coordination normal.   Disoriented to year     Skin: He is not diaphoretic. There is erythema.   Psychiatric: He has a normal mood and affect.   Nursing note and vitals reviewed.        "

## 2019-04-09 PROBLEM — R53.81 DEBILITY: Status: ACTIVE | Noted: 2019-04-09

## 2019-04-09 LAB
ANION GAP SERPL CALC-SCNC: 9 MMOL/L (ref 8–16)
BASOPHILS # BLD AUTO: 0.03 K/UL (ref 0–0.2)
BASOPHILS NFR BLD: 0.3 % (ref 0–1.9)
BUN SERPL-MCNC: 18 MG/DL (ref 8–23)
CALCIUM SERPL-MCNC: 8.7 MG/DL (ref 8.7–10.5)
CHLORIDE SERPL-SCNC: 107 MMOL/L (ref 95–110)
CHOLEST SERPL-MCNC: 122 MG/DL (ref 120–199)
CHOLEST/HDLC SERPL: 3.2 {RATIO} (ref 2–5)
CO2 SERPL-SCNC: 23 MMOL/L (ref 23–29)
CREAT SERPL-MCNC: 1.1 MG/DL (ref 0.5–1.4)
DIFFERENTIAL METHOD: ABNORMAL
EOSINOPHIL # BLD AUTO: 0.3 K/UL (ref 0–0.5)
EOSINOPHIL NFR BLD: 3.9 % (ref 0–8)
ERYTHROCYTE [DISTWIDTH] IN BLOOD BY AUTOMATED COUNT: 14.9 % (ref 11.5–14.5)
EST. GFR  (AFRICAN AMERICAN): >60 ML/MIN/1.73 M^2
EST. GFR  (NON AFRICAN AMERICAN): 60 ML/MIN/1.73 M^2
ESTIMATED AVG GLUCOSE: 123 MG/DL (ref 68–131)
GLUCOSE SERPL-MCNC: 85 MG/DL (ref 70–110)
HBA1C MFR BLD HPLC: 5.9 % (ref 4–5.6)
HCT VFR BLD AUTO: 40.9 % (ref 40–54)
HDLC SERPL-MCNC: 38 MG/DL (ref 40–75)
HDLC SERPL: 31.1 % (ref 20–50)
HGB BLD-MCNC: 13.5 G/DL (ref 14–18)
LDLC SERPL CALC-MCNC: 67.2 MG/DL (ref 63–159)
LYMPHOCYTES # BLD AUTO: 0.9 K/UL (ref 1–4.8)
LYMPHOCYTES NFR BLD: 10.6 % (ref 18–48)
MAGNESIUM SERPL-MCNC: 1.6 MG/DL (ref 1.6–2.6)
MCH RBC QN AUTO: 32 PG (ref 27–31)
MCHC RBC AUTO-ENTMCNC: 33 G/DL (ref 32–36)
MCV RBC AUTO: 97 FL (ref 82–98)
MONOCYTES # BLD AUTO: 0.8 K/UL (ref 0.3–1)
MONOCYTES NFR BLD: 9.3 % (ref 4–15)
NEUTROPHILS # BLD AUTO: 6.7 K/UL (ref 1.8–7.7)
NEUTROPHILS NFR BLD: 75.7 % (ref 38–73)
NONHDLC SERPL-MCNC: 84 MG/DL
PHOSPHATE SERPL-MCNC: 3 MG/DL (ref 2.7–4.5)
PLATELET # BLD AUTO: 116 K/UL (ref 150–350)
PMV BLD AUTO: 11.3 FL (ref 9.2–12.9)
POCT GLUCOSE: 149 MG/DL (ref 70–110)
POCT GLUCOSE: 164 MG/DL (ref 70–110)
POCT GLUCOSE: 91 MG/DL (ref 70–110)
POTASSIUM SERPL-SCNC: 3.6 MMOL/L (ref 3.5–5.1)
RBC # BLD AUTO: 4.22 M/UL (ref 4.6–6.2)
SODIUM SERPL-SCNC: 139 MMOL/L (ref 136–145)
TRIGL SERPL-MCNC: 84 MG/DL (ref 30–150)
WBC # BLD AUTO: 8.81 K/UL (ref 3.9–12.7)

## 2019-04-09 PROCEDURE — 94761 N-INVAS EAR/PLS OXIMETRY MLT: CPT

## 2019-04-09 PROCEDURE — 63600175 PHARM REV CODE 636 W HCPCS: Performed by: INTERNAL MEDICINE

## 2019-04-09 PROCEDURE — 83735 ASSAY OF MAGNESIUM: CPT

## 2019-04-09 PROCEDURE — 97530 THERAPEUTIC ACTIVITIES: CPT

## 2019-04-09 PROCEDURE — 93010 ELECTROCARDIOGRAM REPORT: CPT | Mod: ,,, | Performed by: INTERNAL MEDICINE

## 2019-04-09 PROCEDURE — 25000003 PHARM REV CODE 250: Performed by: INTERNAL MEDICINE

## 2019-04-09 PROCEDURE — 93010 EKG 12-LEAD: ICD-10-PCS | Mod: ,,, | Performed by: INTERNAL MEDICINE

## 2019-04-09 PROCEDURE — 93005 ELECTROCARDIOGRAM TRACING: CPT

## 2019-04-09 PROCEDURE — 99232 PR SUBSEQUENT HOSPITAL CARE,LEVL II: ICD-10-PCS | Mod: ,,, | Performed by: HOSPITALIST

## 2019-04-09 PROCEDURE — 11000001 HC ACUTE MED/SURG PRIVATE ROOM

## 2019-04-09 PROCEDURE — 97162 PT EVAL MOD COMPLEX 30 MIN: CPT

## 2019-04-09 PROCEDURE — 80048 BASIC METABOLIC PNL TOTAL CA: CPT

## 2019-04-09 PROCEDURE — 92610 EVALUATE SWALLOWING FUNCTION: CPT

## 2019-04-09 PROCEDURE — 36415 COLL VENOUS BLD VENIPUNCTURE: CPT

## 2019-04-09 PROCEDURE — 99232 SBSQ HOSP IP/OBS MODERATE 35: CPT | Mod: ,,, | Performed by: HOSPITALIST

## 2019-04-09 PROCEDURE — 97165 OT EVAL LOW COMPLEX 30 MIN: CPT

## 2019-04-09 PROCEDURE — 84100 ASSAY OF PHOSPHORUS: CPT

## 2019-04-09 PROCEDURE — 97535 SELF CARE MNGMENT TRAINING: CPT

## 2019-04-09 PROCEDURE — 85025 COMPLETE CBC W/AUTO DIFF WBC: CPT

## 2019-04-09 RX ORDER — MELOXICAM 15 MG/1
15 TABLET ORAL DAILY PRN
Status: ON HOLD | COMMUNITY
End: 2019-12-17 | Stop reason: HOSPADM

## 2019-04-09 RX ADMIN — TAMSULOSIN HYDROCHLORIDE 0.4 MG: 0.4 CAPSULE ORAL at 09:04

## 2019-04-09 RX ADMIN — LOSARTAN POTASSIUM 50 MG: 50 TABLET, FILM COATED ORAL at 09:04

## 2019-04-09 RX ADMIN — GABAPENTIN 300 MG: 300 CAPSULE ORAL at 09:04

## 2019-04-09 RX ADMIN — PRAMIPEXOLE DIHYDROCHLORIDE 1 MG: 0.5 TABLET ORAL at 09:04

## 2019-04-09 RX ADMIN — CLINDAMYCIN HYDROCHLORIDE 450 MG: 150 CAPSULE ORAL at 05:04

## 2019-04-09 RX ADMIN — DILTIAZEM HYDROCHLORIDE 120 MG: 120 CAPSULE, COATED, EXTENDED RELEASE ORAL at 09:04

## 2019-04-09 RX ADMIN — ENOXAPARIN SODIUM 40 MG: 100 INJECTION SUBCUTANEOUS at 06:04

## 2019-04-09 RX ADMIN — CARBIDOPA AND LEVODOPA 2 TABLET: 25; 100 TABLET ORAL at 09:04

## 2019-04-09 RX ADMIN — PRAMIPEXOLE DIHYDROCHLORIDE 1 MG: 0.5 TABLET ORAL at 02:04

## 2019-04-09 RX ADMIN — CLINDAMYCIN HYDROCHLORIDE 450 MG: 150 CAPSULE ORAL at 09:04

## 2019-04-09 RX ADMIN — ASPIRIN 325 MG: 325 TABLET, DELAYED RELEASE ORAL at 09:04

## 2019-04-09 RX ADMIN — CLINDAMYCIN HYDROCHLORIDE 450 MG: 150 CAPSULE ORAL at 02:04

## 2019-04-09 RX ADMIN — CARBIDOPA AND LEVODOPA 2 TABLET: 25; 100 TABLET ORAL at 02:04

## 2019-04-09 NOTE — PLAN OF CARE
"Met with patient & daughter Lorenza at bedside to discuss discharge disposition including therapy's recommendation for SNF - daughter states "we've gone that route before & it didn't go well" - family will discuss this pm & inform CM tomorrow am regarding their decision but are strongly leaning towards just returning to [facility] to receive whatever care he could there - spoke with Mikayla CHRISTINE from [facility] who will discuss patient with her team & return call to CM   "

## 2019-04-09 NOTE — NURSING
Pt arrived to floor in bed with ,RN. Patient and daughter oriented to room, call light placed within reach, bed low and locked, bed alarm set and family at bedside. Will continue to monitor.

## 2019-04-09 NOTE — NURSING
Pt returns from ultrasound via stretcher. Daughter is in the room with him. Pt is alert to self and knows he is in the hospital. Vital signs were taken. VSS. NAD. Pt denies pain. Does not appear to be in distress. Pt is sleepy but easily awoken. Bed low and locked. Call bell within reach. Bed alarm placed. Tele monitor placed. Will continue to monitor.

## 2019-04-09 NOTE — NURSING
Just before calling report, pt had brief period of ventricular bigeminy. (Not sustained rhythm). Per Hospitalist Eva, Stat labs ordered, stat EKG ordered. Resp called and lab is in room now. Then pt is cleared to be transferred to new room. Report called by Colin to floor. Before sending, evening BGC tested, a bedside swallow test given (passed),  his 2100  Meds were given, labs drawn, EKG completed. Family and patient  updated on POC.

## 2019-04-09 NOTE — NURSING
22:22-CARI Mulligan notified that pt is having PVCs and has had four ventricular Bigeminy. No new orders given. New order written for potassium bicarbonate disintegrating tablet 50 mEq po.

## 2019-04-09 NOTE — PT/OT/SLP EVAL
Speech Language Pathology Evaluation  Bedside Swallow    Patient Name:  Silvano Parmar   MRN:  [id number]  Admitting Diagnosis: TIA (transient ischemic attack)    Recommendations:                   Diet recommendations:  Regular, Thin   Aspiration Precautions: 1 bite/sip at a time, HOB to 90 degrees, Remain upright 30 minutes post meal and Small bites/sips   General Precautions: Standard, aphasia  Communication strategies:    1. Simple commands, make eye contact with patient before asking question of giving command  2. Ask patient to repeat himself when he demonstrates instances of jargon or slurred speech    History:     Mr. Parmar is an 88/M with PMH HTN, HLD, DMII, A-Fib, Parkinson's who presented to ED 04/08 with a several hour history of confusion. History was obtained from the patient's family members as he has some confusion at baseline. They report that he was slightly confused upon being picked up from [facility] for lunch. His son had stated to ED staff that the patient had difficulty climbing stairs, which was new for him, as well as unsteady gait. They deny any fever, chills, SOB, cough, CP, palpitations. They do note some increased BLE swelling and mild erythema of the LLE. They had no other concerns at time of interview.     Hospital Course:  The patient had a CT scan of the head done which showed no acute intracranial abnormalities.  Carotid Doppler ultrasound bilaterally showed no evidence of hemodynamically significant stenosis.  A venous Doppler ultrasound of the bilateral lower extremities was done and showed no evidence of a DVT.  Chest x-ray showed possible signs of mild pulmonary edema with some patchy opacifications bilaterally. PT/OT were consulted to evaluate the patient and skilled services are recommended upon discharge due to the patient's weakness and current difficulty with ambulation.      Past Medical History:   Diagnosis Date    *Atrial fibrillation     AI (aortic  "insufficiency)     mild    Atrial fibrillation     Diabetes mellitus     Diabetes mellitus, type 2     Hyperlipemia 2012    Hyperlipidemia     Hypertension     Memory loss     Parkinson disease     Spinal stenosis memory loss       Past Surgical History:   Procedure Laterality Date    CHOLECYSTECTOMY     .    Subjective     Pt alert, sitting up in bed. Daughter present  Pain/Comfort:  ·   no pain reported    Objective:     Oral Musculature Evaluation  · Oral Musculature: WFL  · Dentition: present and adequate  · Secretion Management: adequate  · Mucosal Quality: good  · Mandibular Strength and Mobility: WFL  · Oral Labial Strength and Mobility: WFL  · Lingual Strength and Mobility: WFL  · Volitional Swallow: prompt  · Voice Prior to PO Intake: clear   · Face is symmetrical at rest and during smile. Labial and lingual strength and ROM are WFL. Lingual protrusion is midline. Pt able to elevate, retract and lateralize tongue. Loss of coordination on rapid alternating speech tasks. Vocal quality is clear. Speech was approx 50-75% intelligible at the simple conversation level. Frequent instances of slurred speech and or jargon, unintelligible responses    COGNITIVE STATUS: Alert, cooperative, confusional state. Pt oriented to his name and , not age. Pt not oriented to month, date, or year. Pt stating year as "1918". Able to correct to 2019 with verbal cue. Oriented to day of week, "Monday", however, vague on month and stating date at the "". Mildly distractible. Able to sustain attention to simple tasks for 25 min with mild redirection. Memory loss noted in medical dx list. ABle to follow simple 1 step commands with 100% acc. Able to follow 2 step commands with 75% acc with need for repetition and visual cue. Verbal expression is fluent, however, instances of word finding, jargon, unintelligible words noted during conversation and responses. Able to name simple common objects and body parts with " "70% acc: was not able to name "Nose"  "ear on command.  ABle to express basic wants and needs approx 50% of time. Required caregiver to anticipate his wants and needs. Unaware of instances of jargon or unintelligible words, or slurred speech . Needs further evaluation. Daughter stating confusion and dcr orientation is not new, however, slurring words or non sense words are not his baseline    Bedside Swallow Eval:   Consistencies Assessed:  · Thin liquids 3 oz water via straw  · Solids dry solids     Oral Phase:   · WFL for lip seal, ability to form a cohesive bolus and a-p transport of liquids and solids    Pharyngeal Phase:   · Trigger of the swallow reflex appears WFL  · No overt signs of airway threat or aspiration on 3 oz water challenge and dry solids: no coughing, throat clearing or change in vocal quality  · No signs of significant pharyngeal residuals    Assessment:     Silvano Parmar is a 88 y.o. male with an SLP diagnosis of normal oral and pharyngeal swallow, cognitive communication deficits.      Goals:   Multidisciplinary Problems     SLP Goals        Problem: SLP Goal    Goal Priority Disciplines Outcome   SLP Goal     SLP Ongoing (interventions implemented as appropriate)   Description:  1. Pt will be able to express basic wants and needs 75% of time in complete and organized sentences given mod assistance from caregiver                    Plan:     · Patient to be seen:      · Plan of Care expires:     · Plan of Care reviewed with:  patient, daughter   · SLP Follow-Up:          Discharge recommendations:    speech pathology at next level of care      Time Tracking:     SLP Treatment Date:   04/09/19  Speech Start Time:  1320  Speech Stop Time:  1345     Speech Total Time (min):  25 min    Billable Minutes: Eval Swallow and Oral Function 25 min    Heaven Muller CCC-SLP  04/09/2019           "

## 2019-04-09 NOTE — PROGRESS NOTES
[facility] Hospital Medicine  Progress Note    Patient Name: Silvano Parmar  MRN: [id number]  Patient Class: IP- Inpatient   Admission Date: 4/8/2019  Length of Stay: 1 days  Attending Physician: Alfreda Alfredo MD  Primary Care Provider: Silvestre Ramirez MD        Subjective:     Principal Problem:TIA (transient ischemic attack)    HPI:  Mr. Parmar is an 88/M with PMH HTN, HLD, DMII, A-Fib, Parkinson's who presented to ED 04/08 with a several hour history of confusion. History was obtained from the patient's family members as he has some confusion at baseline. They report that he was slightly confused upon being picked up from [facility] for lunch. His son had stated to ED staff that the patient had difficulty climbing stairs, which was new for him, as well as unsteady gait. They deny any fever, chills, SOB, cough, CP, palpitations. They do note some increased BLE swelling and mild erythema of the LLE. They had no other concerns at time of interview.    Hospital Course:  The patient had a CT scan of the head done which showed no acute intracranial abnormalities.  Carotid Doppler ultrasound bilaterally showed no evidence of hemodynamically significant stenosis.  A venous Doppler ultrasound of the bilateral lower extremities was done and showed no evidence of a DVT.  Chest x-ray showed possible signs of mild pulmonary edema with some patchy opacifications bilaterally. PT/OT were consulted to evaluate the patient and skilled services are recommended upon discharge due to the patient's weakness and current difficulty with ambulation.    Interval History:  The patient has no complaints today.  The patient has large watery stools.    Review of Systems   Gastrointestinal: Positive for diarrhea.     Objective:     Vital Signs (Most Recent):  Temp: 98.6 °F (37 °C) (04/09/19 1128)  Pulse: 70 (04/09/19 1411)  Resp: 18 (04/09/19 1411)  BP: 137/83 (04/09/19 1128)  SpO2: 95 % (04/09/19 1411) Vital Signs  (24h Range):  Temp:  [97 °F (36.1 °C)-98.6 °F (37 °C)] 98.6 °F (37 °C)  Pulse:  [63-77] 70  Resp:  [16-28] 18  SpO2:  [92 %-96 %] 95 %  BP: (130-160)/(77-88) 137/83     Weight: 100.7 kg (222 lb)  Body mass index is 30.96 kg/m².    Intake/Output Summary (Last 24 hours) at 4/9/2019 1529  Last data filed at 4/9/2019 1448  Gross per 24 hour   Intake 2690 ml   Output 1350 ml   Net 1340 ml      Physical Exam   Constitutional: He is oriented to person, place, and time. He appears well-developed. No distress.   HENT:   Head: Normocephalic and atraumatic.   Eyes: Conjunctivae and EOM are normal.   Cardiovascular: Normal rate and intact distal pulses.   Pulmonary/Chest: Effort normal. No respiratory distress.   Abdominal: Soft. Bowel sounds are normal. There is no tenderness.   Musculoskeletal: Normal range of motion. He exhibits edema (3+ BLE; minimal erythema of LLE with trace warmth).   Neurological: He is alert and oriented to person, place, and time.   Skin: Skin is warm and dry.   Psychiatric: He has a normal mood and affect. His behavior is normal.   Nursing note and vitals reviewed.      Significant Labs:   CBC:   Recent Labs   Lab 04/08/19  1324 04/09/19  0617   WBC 15.29* 8.81   HGB 15.4 13.5*   HCT 46.1 40.9   * 116*     CMP:   Recent Labs   Lab 04/08/19  1324 04/08/19 2108 04/09/19  0617    140 139   K 3.6 3.4* 3.6    107 107   CO2 28 21* 23   GLU 90 96 85   BUN 20 17 18   CREATININE 1.3 1.1 1.1   CALCIUM 9.9 8.8 8.7   PROT 7.7  --   --    ALBUMIN 4.0  --   --    BILITOT 1.9*  --   --    ALKPHOS 111  --   --    AST 25  --   --    ALT 15  --   --    ANIONGAP 10 12 9   EGFRNONAA 49* 60 60     Magnesium:   Recent Labs   Lab 04/08/19  2108 04/09/19  0617   MG 1.7 1.6           Assessment/Plan:      * TIA (transient ischemic attack)  Aspirin and statin therapy daily for CVA prophylaxis    Debility  PT/OT recommends skilled nursing services upon discharge  Discharge planning in  process      Cellulitis  Continue antibiotic therapy with clindamycin 450mg PO q8hr.    Chronic idiopathic thrombocytopenia  There are no signs of acute hemorrhage  Continue to monitor      Benign prostatic hyperplasia  Continue tamsulosin 0.4mg PO daily.    Essential hypertension  - Continuing diltiazem 120mg PO daily, losartan 50mg PO daily.    Type 2 diabetes mellitus, without long-term current use of insulin  Hemoglobin A1c is 5.9  Excellent control continue current management    Parkinson disease  Continue carbidopa-levopopa 25-100mg PO TID, pramipexole 1mg PO TID.    Persistent atrial fibrillation  No anticoagulation as outpatient given frequent fall history.        VTE Risk Mitigation (From admission, onward)        Ordered     enoxaparin injection 40 mg  Daily      04/08/19 1807     IP VTE HIGH RISK PATIENT  Once      04/08/19 1807              Alfreda Alfredo MD  Department of Hospital Medicine   [facility]

## 2019-04-09 NOTE — HOSPITAL COURSE
The patient had a CT scan of the head done which showed no acute intracranial abnormalities.  Carotid Doppler ultrasound bilaterally showed no evidence of hemodynamically significant stenosis.  A venous Doppler ultrasound of the bilateral lower extremities was done and showed no evidence of a DVT.  Chest x-ray showed possible signs of mild pulmonary edema with some patchy opacifications bilaterally. PT/OT were consulted and the patient was resumed on his Parkinson's medications and ambulatory status did improve with assistance.  The patient had some mild erythema to the left lower extremity and was treated with IV antibiotics of Zosyn and subsequently clindamycin.  Erythema completely resolved and antibiotics were discontinued.  The patient will be discharged back to assisted living facility with home health for ADLs, PT/OT, and medication review. The patient is instructed to follow up with his primary care provider within 1 week.

## 2019-04-09 NOTE — NURSING
01:24-CARI Mulligan notified that pt is having PVCs and ventricular Trigeminy. New telephone order given, STAT EKG now. Telephone order read back to CARI Mulligan.    01:41-EKG completed.    01:42-CARI Mulligan notified of EKG results, Ventricular-paced rhythm with frequent premature ventricular complexes. No new orders given. Will continue to monitor pt.

## 2019-04-09 NOTE — PROGRESS NOTES
Transferred from ER to OBS- via stretcher by escort with daughter at the bedside on . Patient transferred from stretcher to with assistance.  Patient id verified, fall and allergy band in place. Patient AAOx2. Patient oriented to room and call bell system. Patient able to voice use of call bell system. Patient call bell placed within reach of patient. Assessed patient and vitals, respirations even and unlabored. Patient assessed for pain using pain scale located in patient room. Monitor patient for facial grimacing and or any guarding. Will provide patient with PRN pain medication as needed. HOB elevated to 45 degrees for lung expansion. Bed locked and in lowest possible position, condition stable, bed alarm set; will continue to monitor patient. vielka Meredith

## 2019-04-09 NOTE — PT/OT/SLP EVAL
Occupational Therapy   Evaluation & Treatment    Name: Silvano Parmar  MRN: [id number]  Admitting Diagnosis:  TIA (transient ischemic attack)      Recommendations:     Discharge Recommendations: nursing facility, skilled  Discharge Equipment Recommendations:  (TBD at next level of care)  Barriers to discharge:  Decreased caregiver support    Assessment:     Silvano Parmar is a 88 y.o. male with a medical diagnosis of TIA (transient ischemic attack).  He presents with pleasant affect. Performance deficits affecting function: weakness, impaired functional mobilty, gait instability, impaired endurance, impaired balance, impaired self care skills, decreased lower extremity function, edema, decreased safety awareness, decreased coordination.  Pt demonstrates strength and ROM in (B) UE needed for ADLs that is WFL, and is able to maintain upright seated position at EOB with SBA.  Pt required increased time for task initiation and Mod A for UB dressing.  Pt performed sit <> stand transfer with Min A and RW with some postural instability noted.  Once standing he experienced two instances of bowel incontinence.  While being cleaned with Total A pt stood for nearly 5 minutes with SBA-CGA and RW.  PTA daughter reports that pt was doing ADLs independently and was utilizing a RW for mobility; however, he was recently having a more difficult time completing tasks and mobilizing safely.  She expressed concern about pt living alone.  Pt requires increased level of assist for ADLs and mobility at this time and is not safe to return home.  He would benefit from skilled OT services to address problems listed above and increase independence with ADLs.  SNF is recommended upon d/c from acute care to further address deficits and help pt improve overall functional independence.     Rehab Prognosis: Good; patient would benefit from acute skilled OT services to address these deficits and reach maximum level of function.       Plan:      Patient to be seen 5 x/week to address the above listed problems via self-care/home management, therapeutic activities, therapeutic exercises  · Plan of Care Expires: 05/09/19  · Plan of Care Reviewed with: patient, daughter    Subjective     Chief Complaint: Incontinence  Patient/Family Comments/goals: Increase (I) with ADLs and mobility    Occupational Profile:  *History provided by pt with daughter confirming/adding information during session    Living Environment: Pt lives alone at [address].  Bathroom contains walk-in shower with shower stool present.       Previous level of function: He reports being (I) with ADLs and was utilizing a RW for mobility.  He wears depends 2* incontinent at times.  RNs check on pt periodically.  Pt says he does not get out in the community often and daughter states he sits down a lot in his apartment.  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  Food is available four times a day, but pt states his children usually bring him something.  Daughter states he has not had recent falls.  More recently pt having more difficult time performing daily routine.    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  Does not drive.  He is a father.      Equipment Used at Home:  walker, rolling, cane, straight, shower chair  Assistance upon Discharge: Daughter states she and siblings check on pt frequently, but no one is with pt 24/7.    Pain/Comfort:  · Pain Rating 1: 0/10  · Pain Rating Post-Intervention 1: 0/10    Patients cultural, spiritual, Restorationism conflicts given the current situation: no    Objective:     Communicated with: RN prior to and during session.  Patient found HOB elevated with (resting comfortably in bed; daughter present) upon OT entry to room.    General Precautions: Standard, fall   Orthopedic Precautions:N/A   Braces: N/A     Occupational Performance:    Bed Mobility:    · Patient completed  "Scooting/Bridging with stand by assistance  · Patient completed Supine to Sit with contact guard assistance  · Patient completed Sit to Supine with contact guard assistance    Functional Mobility/Transfers:  · Patient completed Sit <> Stand Transfer with minimum assistance  with  rolling walker x 3 trials from EOB.  · Functional Mobility: Pt marched in place 1 set x 8 times with CGA and RW.  During task pt experienced episode of bowel incontinence.      Activities of Daily Living:  · Upper Body Dressing: Min A for doffing soiled gown; moderate assistance for donning gown while seated at EOB.  Some assist required for task initiation and to pull around backside.  During task pt stated, "I never do this sitting down."  · Lower Body Dressing: Total A for donning/doffing socks while seated at EOB.  Increased swelling noted in both feet.  · Toileting: Pt with two instances of bowel incontinence with Total A required for performing dom care in standing position.  Pt stood for nearly 5 minutes with CGA and RW during task with pt able to maintain balance with SBA for short portions of task.  Total A required to clean bowel movement off feet while sitting.      Cognitive/Visual Perceptual:  Cognitive/Psychosocial Skills:    -       Oriented to: Person, Place, and birthday.  Able to identify daughter in room and number of grandchildren/great grandchildren   -       Follows Commands/attention:Follows two-step commands; some delayed initiation of task.  -       Communication: clear/fluent  -       Memory: Some deficits present  -       Safety awareness/insight to disability: impaired   -       Mood/Affect/Coping skills/emotional control: Pleasant    Physical Exam:  Postural examination/scapula alignment:   -       Rounded shoulders  -       Forward head  Skin integrity: Dry with some redness from knees and below  Edema:  Moderate in (B) LE from knee and below.  L > R  Sensation: -       Intact for (B) UE; some " neuropathy  Motor Planning: -       WfL  Dominant hand: -       Right  Upper Extremity Range of Motion:    -       Right Upper Extremity: WFL  -       Left Upper Extremity: WFL  Upper Extremity Strength:   -       Right Upper Extremity: WFL; grossly 4/5 for all muscle groups  -       Left Upper Extremity: WFL; grossly 4/5 for all muscle groups   Strength:5/5 both hands  Fine Motor Coordination: Intact  Gross motor coordination: WFL  Balance:  Sitting- SBA; Static standing- CGA  Vision: Intact    AMPAC 6 Click ADL:  AMPAC Total Score: 13    Treatment & Education:  *Pt educated on role of OT  *Pt performed UB dressing in seated position  *Pt performed multiple sit <> stand transfers  *Pt took steps in place then had episode of bowel incontinence.  Pt stood while hygiene care performed.    *POC reviewed with pt  Education:    Patient left HOB elevated with all lines intact, call button in reach and PCT, MD, and daughter present    GOALS:   Multidisciplinary Problems     Occupational Therapy Goals        Problem: Occupational Therapy Goal    Goal Priority Disciplines Outcome Interventions   Occupational Therapy Goal     OT, PT/OT     Description:  Goals to be met by: 4/17/2019     Patient will increase functional independence with ADLs by performing:    UE Dressing with Minimal Assistance.  LE Dressing with Maximum Assistance.  Grooming while seated with Stand-by Assistance.  Toileting from bedside commode with Moderate Assistance for hygiene and clothing management.   Toilet transfer to bedside commode with Minimal Assistance.                      History:     Past Medical History:   Diagnosis Date    *Atrial fibrillation     AI (aortic insufficiency)     mild    Atrial fibrillation     Diabetes mellitus     Diabetes mellitus, type 2     Hyperlipidemia     Hypertension     Memory loss     Parkinson disease     Spinal stenosis memory loss       Past Surgical History:   Procedure Laterality Date     CHOLECYSTECTOMY         Time Tracking:     OT Date of Treatment: 04/09/19  OT Start Time: 1117  OT Stop Time: 1208  OT Total Time (min): 51 min    Billable Minutes:Evaluation 25  Self Care/Home Management 12  Therapeutic Activity 14    NELLY Mccoy  4/9/2019

## 2019-04-09 NOTE — PLAN OF CARE
Problem: Adult Inpatient Plan of Care  Goal: Plan of Care Review  Outcome: Ongoing (interventions implemented as appropriate)  Remains free from fall, injury, and skin breakdown. Incontinence care performed. BMX2. Up in BSC with PT assistance. VS stable on RA and afebrile. No pain reported. BG monitored. Tolerating ordered diet. IV site WNL. Plan of care reviewed with patient and all questions answered. Bed low, locked w/ bed alarm on. Call light within reach. Purposeful rounding performed. No other complaints at this time.

## 2019-04-09 NOTE — PLAN OF CARE
Problem: Occupational Therapy Goal  Goal: Occupational Therapy Goal  Goals to be met by: 4/17/2019     Patient will increase functional independence with ADLs by performing:    UE Dressing with Minimal Assistance.  LE Dressing with Maximum Assistance.  Grooming while seated with Stand-by Assistance.  Toileting from bedside commode with Moderate Assistance for hygiene and clothing management.   Toilet transfer to bedside commode with Minimal Assistance.      OT evaluation complete and POC established.  SNF is recommended upon d/c from acute care to further address deficits and help pt improve overall functional independence.     NELLY Mccoy  4/9/2019

## 2019-04-09 NOTE — PLAN OF CARE
Problem: SLP Goal  Goal: SLP Goal  1. Pt will be able to express basic wants and needs 75% of time in complete and organized sentences given mod assistance from caregiver  Outcome: Ongoing (interventions implemented as appropriate)  Pt seen this date for bedside swallow evaluation, and initiation of cognitive communication assessment    Comments: Speech to follow 3-5x/week

## 2019-04-09 NOTE — PLAN OF CARE
SW met with pt at bedside to complete discharge assessment, daughter, Lorenza Melendrez [phone], present as answered some of the questions. Pt oriented x3, verified PCP and uses [facility] on [place]. Pt has a RW, SC and straight cane.  Pt lives at [facility] and independent with ADLs and staff assists with IADLs and family will provide transportation home.  No needs identified by daughter, pt or SW at this time.     04/09/19 1002   Discharge Assessment   Assessment Type Discharge Planning Assessment   Confirmed/corrected address and phone number on facesheet? Yes   Assessment information obtained from? Patient;Caregiver   Communicated expected length of stay with patient/caregiver no   Prior to hospitilization cognitive status: Not Oriented to Person;Not Oriented to Place   Prior to hospitalization functional status: Independent;Assistive Equipment;Needs Assistance   Current cognitive status: Alert/Oriented   Current Functional Status: Assistive Equipment;Needs Assistance   Lives With alone   Able to Return to Prior Arrangements yes   Is patient able to care for self after discharge? Unable to determine at this time (comments)   Who are your caregiver(s) and their phone number(s)?   (Marissa, daughter)   Readmission Within the Last 30 Days no previous admission in last 30 days   Patient currently being followed by outpatient case management? No   Patient currently receives any other outside agency services? No   Equipment Currently Used at Home walker, rolling;cane, straight;shower chair   Do you have any problems affording any of your prescribed medications? No   Is the patient taking medications as prescribed? yes   Does the patient have transportation home? Yes   Transportation Anticipated family or friend will provide   Does the patient receive services at the [facility]? No   Discharge Plan A Home   DME Needed Upon Discharge  none   Patient/Family in Agreement with Plan yes

## 2019-04-09 NOTE — PLAN OF CARE
Problem: Physical Therapy Goal  Goal: Physical Therapy Goal  Goals to be met by: 19    Patient will increase functional independence with mobility by performin. Gait x 50 feet SBA with rolling walker  2. Supine<>sit with SBA without use of hospital bed features.  3. Sit<>stand with demo of safety awareness without VCs and use of RW with SBA.    Patient evaluated by PT and goals established. Patient with urgent need to use BSC and assisted with modA for stand step transfer to commode. Requires modA supine<>sit with use of hospital bed features, min-modA with sit<.stand with ongoing visual and verbal cues for safety awareness (hand placement), gait x15 ft with RW and Lobito. PT will continue to follow and progress as tolerated. Recommendation for d/c to SNF to maximize patient's independence with functional mobility and reduce risk of falls. Patient unable to perform functional mobility without assistance at this time and is at an increased risk for falls. Please see progress note for detailed plan of care and recommendations.

## 2019-04-09 NOTE — PT/OT/SLP EVAL
Physical Therapy Evaluation and Treatment    Patient Name:  Silvano Parmar   MRN:  [id number]    Recommendations:     Discharge Recommendations:  nursing facility, skilled(If pt d/c to [facility], rec 24/7 assistance and HH PT/OT)   Discharge Equipment Recommendations: (TBD at next level of care; if d/c to [facility] may require transport W/C)   Barriers to discharge: Decreased caregiver support    Assessment:     Silvano Parmar is a 88 y.o. male admitted with a medical diagnosis of TIA (transient ischemic attack). pmhx significant for Parkinson's disease with memory loss, HTN, HLD, DM-II, and a-fib.  He presents with the following impairments/functional limitations:  weakness, impaired self care skills, impaired functional mobilty, gait instability, impaired balance, impaired cognition, decreased lower extremity function, decreased safety awareness, impaired coordination, edema. Due to the above impairments, the patient is limited in his ability to modifed independently ambulate, transfer, and participate in his chosen activities.    Patient evaluated by PT and goals established. Patient with urgent need to use BSC and assisted with modA for stand step transfer to commode. Requires modA supine<>sit with use of hospital bed features, min-modA with sit<>stand with ongoing visual and verbal cues for safety awareness (hand placement), gait x15 ft with RW and Lobito. PT will continue to follow and progress as tolerated.     Recommendation for d/c to SNF to maximize patient's independence with functional mobility and reduce risk of falls. Patient unable to perform functional mobility without assistance at this time and is at an increased risk for falls.     Patient appropriate to have PT interventions performed by a PTA.     Rehab Prognosis: Fair(+); patient would benefit from acute skilled PT services to address these deficits and reach maximum level of function.    Recent Surgery: * No surgery found *       Plan:     During this hospitalization, patient to be seen 6 x/week to address the identified rehab impairments via gait training, therapeutic activities, therapeutic exercises, neuromuscular re-education and progress toward the following goals:    · Plan of Care Expires:  19    Subjective     Chief Complaint: Needs to have a BM  Patient/Family Comments/goals: Pt with no clearly stated goals, agreeable to walking  Pain/Comfort:  · Pain Rating 1: 0/10  · Pain Rating Post-Intervention 1: 0/10    Patients cultural, spiritual, Church conflicts given the current situation: no    Living Environment:  Patient is a resident at [facility] in an assisted living apartment with no [place]. He has a bathroom with a wlak in shower with a shower stool present.    Prior to admission, patients level of function was modified independent with use of RW for ambulation. He reports being (I) with ADLs and wears depends 2/2 incontinent at times. [facility] provides laundry service (collects and delivers).  Food is available four times a day, but pt states his children usually bring him something.  Daughter states he has not had recent falls.  More recently pt having more difficult time performing daily routine. 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  Does not drive.  He is a father.  Equipment used at home: walker, rolling, cane, straight, shower chair..      Assistance upon Discharge: Daughter states she and siblings check on pt frequently, but no one is with pt .    Objective:     Communicated with YAQUELIN Kelley prior to session.  Patient found HOB elevated with bed alarm, peripheral IV, Condom Catheter  upon PT entry to room. Patient agreeable to evaluation.    General Precautions: Standard, fall, diabetic   Orthopedic Precautions:N/A   Braces: N/A     Patient donned yellow socks and gait belt for OOB mobility.    Exams:  · Cognition:   · Patient is oriented to person, , place,  "states it is Monday and 2018. When asked, reports the president is "Trumb? Tumb?"  · Pt follows approximately 90% of one and two step commands, with some delay in initiation of task.    · Mood: Pleasant and cooperative.  · Musculoskeletal:  · Posture:    · Rounded shoulders  · Forward head  · Increased kyphotic posture  · LE ROM/Strength:   · R ROM: WFL  · L ROM: WFL  · R Strength: WFL, hip flexion noted to be 4/5   · L Strength: WFL, hip flexion noted to be 4/5   · Neuromuscular:  · Sensation: Intact to light touch bilateral LEs.  · Tone/Reflexes: No impairments identified with functional mobility. No formal testing performed.   · Coordination:  · Heel to shin: Intact  · Toe tapping: Impaired bilaterally, with increased speed of toe tapping noted loss of synchronicity and decreased amplitude of taps until taps stopped  · Balance:   · Sitting balance: Good, able to reach laterally and return to COG without UE support, able to reach anteriorly to touch toes and return to sitting, maintains sitting balance to moderate perturbations  · Standing balance: requires BUE support on RW and CGA-Lobito with increased width of CYRUS, noted posterior weight shift in standing  · Visual-vestibular: No impairments identified with functional mobility. No formal testing performed.  · Integument: Visible skin intact  · Cardiopulmonary:  · Edema: 3+ BLE with erythema (L>R)      Functional Mobility:  · Bed Mobility:     · Supine to Sit: moderate assistance  · Sit to Supine: moderate assistance  · Transfers:     · Sit to Stand:  minimum assistance and moderate assistance with rolling walker  · Toilet Transfer: moderate assistance with  rolling walker  using  Step Transfer  · Gait: x15 ft with RW and Lobito.   · Noted pt maintains increased kyphotic posture, increased foot clearance after initial verbal cue to not shuffle feet, narrowed step width, decreased stride length, and increased lateral sway with decreased gait speed.      Therapeutic " Activities and Exercises:   Stand step transfer to commode, sit<>stand, supine<>sit, gait    PT educated patient re: PT plan of care, role of PT, safety with OOB mobility, use of RW and commode, transfer technique.  Pt verbalize understanding but requires reinforcement.      AM-PAC 6 CLICK MOBILITY  Total Score:13     Patient left HOB elevated with all lines intact, call button in reach, bed alarm on and RN Allie notified.    GOALS:   Multidisciplinary Problems     Physical Therapy Goals        Problem: Physical Therapy Goal    Goal Priority Disciplines Outcome Goal Variances Interventions   Physical Therapy Goal     PT, PT/OT      Description:  Goals to be met by: 19    Patient will increase functional independence with mobility by performin. Gait x 50 feet SBA with rolling walker  2. Supine<>sit with SBA without use of hospital bed features.  3. Sit<>stand with demo of safety awareness without VCs and use of RW with SBA.                      History:     Past Medical History:   Diagnosis Date    *Atrial fibrillation     AI (aortic insufficiency)     mild    Atrial fibrillation     Diabetes mellitus     Diabetes mellitus, type 2     Hyperlipidemia     Hypertension     Memory loss     Parkinson disease     Spinal stenosis memory loss       Past Surgical History:   Procedure Laterality Date    CHOLECYSTECTOMY         Time Tracking:     PT Received On: 19  PT Start Time: 955     PT Stop Time: 1017  PT Total Time (min): 22 min     Billable Minutes: Evaluation 14 and Therapeutic Activity 8      Elisha Sawyer, PT  2019

## 2019-04-10 LAB
ANION GAP SERPL CALC-SCNC: 7 MMOL/L (ref 8–16)
AORTIC ROOT ANNULUS: 3.65 CM
AORTIC VALVE CUSP SEPERATION: 1.61 CM
ASCENDING AORTA: 2.81 CM
AV INDEX (PROSTH): 0.51
AV MEAN GRADIENT: 6.19 MMHG
AV PEAK GRADIENT: 11.7 MMHG
AV VALVE AREA: 1.73 CM2
AV VELOCITY RATIO: 0.47
BASOPHILS # BLD AUTO: 0.01 K/UL (ref 0–0.2)
BASOPHILS NFR BLD: 0.2 % (ref 0–1.9)
BSA FOR ECHO PROCEDURE: 2.25 M2
BUN SERPL-MCNC: 20 MG/DL (ref 8–23)
CALCIUM SERPL-MCNC: 8.9 MG/DL (ref 8.7–10.5)
CHLORIDE SERPL-SCNC: 106 MMOL/L (ref 95–110)
CO2 SERPL-SCNC: 24 MMOL/L (ref 23–29)
CREAT SERPL-MCNC: 1.2 MG/DL (ref 0.5–1.4)
CV ECHO LV RWT: 0.56 CM
DIFFERENTIAL METHOD: ABNORMAL
DOP CALC AO PEAK VEL: 1.71 M/S
DOP CALC AO VTI: 36.54 CM
DOP CALC LVOT AREA: 3.43 CM2
DOP CALC LVOT DIAMETER: 2.09 CM
DOP CALC LVOT PEAK VEL: 0.81 M/S
DOP CALC LVOT STROKE VOLUME: 63.37 CM3
DOP CALCLVOT PEAK VEL VTI: 18.48 CM
E WAVE DECELERATION TIME: 121.4 MSEC
E/A RATIO: 1.45
E/E' RATIO: 13.43
ECHO LV POSTERIOR WALL: 1.42 CM (ref 0.6–1.1)
EOSINOPHIL # BLD AUTO: 0.3 K/UL (ref 0–0.5)
EOSINOPHIL NFR BLD: 4.8 % (ref 0–8)
ERYTHROCYTE [DISTWIDTH] IN BLOOD BY AUTOMATED COUNT: 14.8 % (ref 11.5–14.5)
EST. GFR  (AFRICAN AMERICAN): >60 ML/MIN/1.73 M^2
EST. GFR  (NON AFRICAN AMERICAN): 54 ML/MIN/1.73 M^2
FRACTIONAL SHORTENING: 23 % (ref 28–44)
GLUCOSE SERPL-MCNC: 114 MG/DL (ref 70–110)
HCT VFR BLD AUTO: 40.8 % (ref 40–54)
HGB BLD-MCNC: 13.4 G/DL (ref 14–18)
INTERVENTRICULAR SEPTUM: 1.27 CM (ref 0.6–1.1)
IVRT: 0.1 MSEC
LA MAJOR: 7.86 CM
LA MINOR: 7.6 CM
LA WIDTH: 5.01 CM
LEFT ATRIUM SIZE: 5.22 CM
LEFT ATRIUM VOLUME INDEX: 77.9 ML/M2
LEFT ATRIUM VOLUME: 171.78 CM3
LEFT INTERNAL DIMENSION IN SYSTOLE: 3.93 CM (ref 2.1–4)
LEFT VENTRICLE DIASTOLIC VOLUME INDEX: 56.47 ML/M2
LEFT VENTRICLE DIASTOLIC VOLUME: 124.5 ML
LEFT VENTRICLE MASS INDEX: 129 G/M2
LEFT VENTRICLE SYSTOLIC VOLUME INDEX: 30.4 ML/M2
LEFT VENTRICLE SYSTOLIC VOLUME: 67.06 ML
LEFT VENTRICULAR INTERNAL DIMENSION IN DIASTOLE: 5.11 CM (ref 3.5–6)
LEFT VENTRICULAR MASS: 284.41 G
LV LATERAL E/E' RATIO: 18.8
LV SEPTAL E/E' RATIO: 10.44
LYMPHOCYTES # BLD AUTO: 0.8 K/UL (ref 1–4.8)
LYMPHOCYTES NFR BLD: 12.1 % (ref 18–48)
MAGNESIUM SERPL-MCNC: 1.8 MG/DL (ref 1.6–2.6)
MCH RBC QN AUTO: 31.7 PG (ref 27–31)
MCHC RBC AUTO-ENTMCNC: 32.8 G/DL (ref 32–36)
MCV RBC AUTO: 97 FL (ref 82–98)
MONOCYTES # BLD AUTO: 0.6 K/UL (ref 0.3–1)
MONOCYTES NFR BLD: 9.4 % (ref 4–15)
MV PEAK A VEL: 0.65 M/S
MV PEAK E VEL: 0.94 M/S
NEUTROPHILS # BLD AUTO: 4.7 K/UL (ref 1.8–7.7)
NEUTROPHILS NFR BLD: 73.2 % (ref 38–73)
PHOSPHATE SERPL-MCNC: 3.9 MG/DL (ref 2.7–4.5)
PISA TR MAX VEL: 2.96 M/S
PLATELET # BLD AUTO: 129 K/UL (ref 150–350)
PMV BLD AUTO: 11.2 FL (ref 9.2–12.9)
POCT GLUCOSE: 112 MG/DL (ref 70–110)
POCT GLUCOSE: 123 MG/DL (ref 70–110)
POTASSIUM SERPL-SCNC: 3.8 MMOL/L (ref 3.5–5.1)
RA MAJOR: 7.16 CM
RA WIDTH: 3.42 CM
RBC # BLD AUTO: 4.23 M/UL (ref 4.6–6.2)
RV TISSUE DOPPLER FREE WALL SYSTOLIC VELOCITY 1 (APICAL 4 CHAMBER VIEW): 6.77 M/S
SINUS: 2.72 CM
SODIUM SERPL-SCNC: 137 MMOL/L (ref 136–145)
STJ: 2.4 CM
TDI LATERAL: 0.05
TDI SEPTAL: 0.09
TDI: 0.07
TR MAX PG: 35.05 MMHG
TRICUSPID ANNULAR PLANE SYSTOLIC EXCURSION: 1.31 CM
WBC # BLD AUTO: 6.46 K/UL (ref 3.9–12.7)

## 2019-04-10 PROCEDURE — 97116 GAIT TRAINING THERAPY: CPT

## 2019-04-10 PROCEDURE — 92507 TX SP LANG VOICE COMM INDIV: CPT

## 2019-04-10 PROCEDURE — 83735 ASSAY OF MAGNESIUM: CPT

## 2019-04-10 PROCEDURE — 36415 COLL VENOUS BLD VENIPUNCTURE: CPT

## 2019-04-10 PROCEDURE — 85025 COMPLETE CBC W/AUTO DIFF WBC: CPT

## 2019-04-10 PROCEDURE — 63600175 PHARM REV CODE 636 W HCPCS: Performed by: INTERNAL MEDICINE

## 2019-04-10 PROCEDURE — 94761 N-INVAS EAR/PLS OXIMETRY MLT: CPT

## 2019-04-10 PROCEDURE — 84100 ASSAY OF PHOSPHORUS: CPT

## 2019-04-10 PROCEDURE — 80048 BASIC METABOLIC PNL TOTAL CA: CPT

## 2019-04-10 PROCEDURE — 11000001 HC ACUTE MED/SURG PRIVATE ROOM

## 2019-04-10 PROCEDURE — 99232 PR SUBSEQUENT HOSPITAL CARE,LEVL II: ICD-10-PCS | Mod: ,,, | Performed by: HOSPITALIST

## 2019-04-10 PROCEDURE — 25000003 PHARM REV CODE 250: Performed by: INTERNAL MEDICINE

## 2019-04-10 PROCEDURE — 99232 SBSQ HOSP IP/OBS MODERATE 35: CPT | Mod: ,,, | Performed by: HOSPITALIST

## 2019-04-10 PROCEDURE — 97110 THERAPEUTIC EXERCISES: CPT

## 2019-04-10 RX ADMIN — CLINDAMYCIN HYDROCHLORIDE 450 MG: 150 CAPSULE ORAL at 03:04

## 2019-04-10 RX ADMIN — DILTIAZEM HYDROCHLORIDE 120 MG: 120 CAPSULE, COATED, EXTENDED RELEASE ORAL at 08:04

## 2019-04-10 RX ADMIN — LOSARTAN POTASSIUM 50 MG: 50 TABLET, FILM COATED ORAL at 08:04

## 2019-04-10 RX ADMIN — CARBIDOPA AND LEVODOPA 2 TABLET: 25; 100 TABLET ORAL at 09:04

## 2019-04-10 RX ADMIN — GABAPENTIN 300 MG: 300 CAPSULE ORAL at 08:04

## 2019-04-10 RX ADMIN — ASPIRIN 325 MG: 325 TABLET, DELAYED RELEASE ORAL at 08:04

## 2019-04-10 RX ADMIN — GABAPENTIN 300 MG: 300 CAPSULE ORAL at 09:04

## 2019-04-10 RX ADMIN — PRAMIPEXOLE DIHYDROCHLORIDE 1 MG: 0.5 TABLET ORAL at 03:04

## 2019-04-10 RX ADMIN — ENOXAPARIN SODIUM 40 MG: 100 INJECTION SUBCUTANEOUS at 06:04

## 2019-04-10 RX ADMIN — CARBIDOPA AND LEVODOPA 2 TABLET: 25; 100 TABLET ORAL at 08:04

## 2019-04-10 RX ADMIN — TAMSULOSIN HYDROCHLORIDE 0.4 MG: 0.4 CAPSULE ORAL at 08:04

## 2019-04-10 RX ADMIN — CLINDAMYCIN HYDROCHLORIDE 450 MG: 150 CAPSULE ORAL at 05:04

## 2019-04-10 RX ADMIN — PRAMIPEXOLE DIHYDROCHLORIDE 1 MG: 0.5 TABLET ORAL at 09:04

## 2019-04-10 RX ADMIN — PRAMIPEXOLE DIHYDROCHLORIDE 1 MG: 0.5 TABLET ORAL at 08:04

## 2019-04-10 RX ADMIN — CARBIDOPA AND LEVODOPA 2 TABLET: 25; 100 TABLET ORAL at 03:04

## 2019-04-10 RX ADMIN — CLINDAMYCIN HYDROCHLORIDE 450 MG: 150 CAPSULE ORAL at 09:04

## 2019-04-10 NOTE — PLAN OF CARE
Spoke with daughter Marissa via phone & son Jesus at bedside - both voiced family's preference for patient to return to [facility] & receive continued PT/OT there - Jesus going to [facility] to discuss with staff there & will report back to CM

## 2019-04-10 NOTE — NURSING
No significant events overnight. Remains free from fall, injury, and skin breakdown. Voiding via external male catheter. VSS on RA throughout the night. Repositions independently in bed.  Denies pain. Plan of care reviewed with patient and all questions answered. Bed low, locked w/ bed alarm on. Call light within reach. Purposeful rounding performed. Resting comfortably in bed, no other complaints at this time.

## 2019-04-10 NOTE — PLAN OF CARE
Problem: Physical Therapy Goal  Goal: Physical Therapy Goal  Goals to be met by: 19    Patient will increase functional independence with mobility by performin. Gait x 50 feet SBA with rolling walker  2. Supine<>sit with SBA without use of hospital bed features.  3. Sit<>stand with demo of safety awareness without VCs and use of RW with SBA.     Pt progressing towards goals, sup to sit CGA/min.A from almost flat bed with no rails, sit to stand CGA/SBA with RW, amb'd 100' with RW and CGA/occ min.A for turns. Recommending cont PT in SNF (however if pt chooses to go home, will need 24/7 assistance initially , and HHPT).

## 2019-04-10 NOTE — PLAN OF CARE
Problem: Adult Inpatient Plan of Care  Goal: Plan of Care Review  Outcome: Ongoing (interventions implemented as appropriate)  Patient remains on room air saturation 94-95% with no distress.

## 2019-04-10 NOTE — NURSING
No significant events this shift. Remains free from fall, injury, and skin breakdown. Condom cath in place. VSS stable on RA and afebrile. Positions self independently. No c/o pain this shift. Neuro checks WDL. Tolerating ordered diet. IV site WNL. Plan of care reviewed with patient and all questions answered. Bed low, locked w/ bed alarm on. Call light within reach. Purposeful rounding performed. No other complaints at this time.

## 2019-04-10 NOTE — PROGRESS NOTES
[facility] Hospital Medicine  Progress Note    Patient Name: Silvano Parmar  MRN: [id number]  Patient Class: IP- Inpatient   Admission Date: 4/8/2019  Length of Stay: 2 days  Attending Physician: Alfreda Alfredo MD  Primary Care Provider: Silvestre Ramirez MD        Subjective:     Principal Problem:TIA (transient ischemic attack)    HPI:  Mr. Parmar is an 88/M with PMH HTN, HLD, DMII, A-Fib, Parkinson's who presented to ED 04/08 with a several hour history of confusion. History was obtained from the patient's family members as he has some confusion at baseline. They report that he was slightly confused upon being picked up from [facility] for lunch. His son had stated to ED staff that the patient had difficulty climbing stairs, which was new for him, as well as unsteady gait. They deny any fever, chills, SOB, cough, CP, palpitations. They do note some increased BLE swelling and mild erythema of the LLE. They had no other concerns at time of interview.    Hospital Course:  The patient had a CT scan of the head done which showed no acute intracranial abnormalities.  Carotid Doppler ultrasound bilaterally showed no evidence of hemodynamically significant stenosis.  A venous Doppler ultrasound of the bilateral lower extremities was done and showed no evidence of a DVT.  Chest x-ray showed possible signs of mild pulmonary edema with some patchy opacifications bilaterally. PT/OT were consulted to evaluate the patient and discharge planning is in progress.     Interval History: The patient is participating with therapy and has no complaints today.    Review of Systems   All other systems reviewed and are negative.    Objective:     Vital Signs (Most Recent):  Temp: 97.9 °F (36.6 °C) (04/10/19 1227)  Pulse: 73 (04/10/19 1227)  Resp: 18 (04/10/19 1227)  BP: (!) 171/85 (04/10/19 1227)  SpO2: (!) 92 % (04/10/19 1227) Vital Signs (24h Range):  Temp:  [97.5 °F (36.4 °C)-98.6 °F (37 °C)] 97.9 °F (36.6  °C)  Pulse:  [70-80] 73  Resp:  [16-18] 18  SpO2:  [92 %-94 %] 92 %  BP: (133-173)/(68-91) 171/85     Weight: 100.7 kg (222 lb)  Body mass index is 30.96 kg/m².    Intake/Output Summary (Last 24 hours) at 4/10/2019 1442  Last data filed at 4/10/2019 0836  Gross per 24 hour   Intake 1320 ml   Output 1075 ml   Net 245 ml      Physical Exam   Constitutional: He is oriented to person, place, and time. He appears well-developed. No distress.   HENT:   Head: Normocephalic and atraumatic.   Eyes: Conjunctivae and EOM are normal.   Cardiovascular: Normal rate and intact distal pulses.   Pulmonary/Chest: Effort normal. No respiratory distress.   Abdominal: Soft. Bowel sounds are normal. There is no tenderness.   Musculoskeletal: Normal range of motion. He exhibits edema (3+ BLE; minimal erythema of LLE with trace warmth).   Neurological: He is alert and oriented to person, place, and time.   Skin: Skin is warm and dry.   Psychiatric: He has a normal mood and affect. His behavior is normal.   Nursing note and vitals reviewed.      Significant Labs:   CBC:   Recent Labs   Lab 04/09/19  0617 04/10/19  0528   WBC 8.81 6.46   HGB 13.5* 13.4*   HCT 40.9 40.8   * 129*     CMP:   Recent Labs   Lab 04/08/19  2108 04/09/19  0617 04/10/19  0528    139 137   K 3.4* 3.6 3.8    107 106   CO2 21* 23 24   GLU 96 85 114*   BUN 17 18 20   CREATININE 1.1 1.1 1.2   CALCIUM 8.8 8.7 8.9   ANIONGAP 12 9 7*   EGFRNONAA 60 60 54*     Magnesium:   Recent Labs   Lab 04/08/19  2108 04/09/19  0617 04/10/19  0528   MG 1.7 1.6 1.8         Assessment/Plan:      * TIA (transient ischemic attack)  Aspirin and statin therapy daily for CVA prophylaxis    Debility  PT/OT evaluation continues  Discharge planning in process      Cellulitis  Continue antibiotic therapy with clindamycin 450mg PO q8hr.    Chronic idiopathic thrombocytopenia  There are no signs of acute hemorrhage  Continue to monitor      Benign prostatic hyperplasia  Continue  tamsulosin 0.4mg PO daily.    Essential hypertension  - Continuing diltiazem 120mg PO daily, losartan 50mg PO daily.    Type 2 diabetes mellitus, without long-term current use of insulin  Hemoglobin A1c is 5.9  Excellent control continue current management    Parkinson disease  Continue carbidopa-levopopa 25-100mg PO TID, pramipexole 1mg PO TID.    Persistent atrial fibrillation  No anticoagulation as outpatient given frequent fall history.        VTE Risk Mitigation (From admission, onward)        Ordered     enoxaparin injection 40 mg  Daily      04/08/19 1807     IP VTE HIGH RISK PATIENT  Once      04/08/19 1807              Alfreda Alfredo MD  Department of Hospital Medicine   [facility]

## 2019-04-10 NOTE — PLAN OF CARE
Problem: SLP Goal  Goal: SLP Goal  1. Pt will be able to express basic wants and needs 75% of time in complete and organized sentences given mod assistance from caregiver   2. Pt will be able to monitor rate, volume, and adequate breath support to increase speech intelligibility to 90% given moderate cues from caregivers.   3. Pt will be able to name complex objects with 90% accuracy given min cues.   Outcome: Ongoing (interventions implemented as appropriate)  Pt seen this date for continued evaluation of expressive language and speech intelligibility    Comments: SLP to continue to follow

## 2019-04-10 NOTE — PT/OT/SLP PROGRESS
"Speech Language Pathology Treatment    Patient Name:  Silvano Parmra   MRN:  [id number]  Admitting Diagnosis: TIA (transient ischemic attack)    Recommendations:            Diet recommendations:  Regular, Thin     Aspiration Precautions: 1 bite/sip at a time, HOB to 90 degrees, Remain upright 30 minutes post meal and Small bites/sips     General Precautions: Standard, aphasia    Communication strategies:    1. Simple commands, make eye contact with patient before asking question of giving command  2. Ask patient to repeat himself when he demonstrates instances of jargon or slurred speech    Subjective     Pt awake, sitting up in chair watching television. Son present for part of session.    Pain/Comfort:  · Pain Rating 1: 0/10    Objective:     Has the patient been evaluated by SLP for swallowing?      Keep patient NPO?     Current Respiratory Status:        Cognitive Communication Status: Pt awake, alert, some confusion with daily events noted. Oriented to person and month with 100% accuracy. Originally stating "1918, 2018" for year, however, self corrected to "2019". Pt stating "[facility]" for exact location. Unable to state day of week or correct date. Mildly distractible, requires min redirection to sustain attention to task for 30 mins. Able to recognize need for turning TV off immediately. Will continue to require caregivers to anticipate wants and needs.     Expressive language: Verbal expression continues to be fluent. Pt continues to demonstrate instances of jargon and unintelligible speech. In conversation, pt often demonstrating jargon and rambling responses to open ended questions (I.e. "How was breakfast?"), pt making hand gestures and facial expressions, however, speech is unintelligible. Able to name common objects with 80% accuracy, accuracy dcr to 60% with complex objects. Phonemic cues were not always successful in helping pt. Pt demonstrating circumlocution as compensatory strategy when unable to " "identify objects. Pt also demonstrating paraphasias as responses to some stimuli (I.e. "tontalus" for "octopus" and "hammer" for "hammock"). In a picture description task, pt demonstrating use of fillers such as "you know" or "umm", and often repeating himself. Able to complete responsive naming task with 90% accuracy. Dcr categorical naming noted with only 12 animals named in 1 minute (norm=15-20) and only 6 words beginning with letter "F" named in one minute. Pt demonstrating dcr thought organization and onset of intelligible speech during difficult tasks.     Motor speech: Pt judged to be 60%-80% intelligible this date to familiar listener. Requires moderate cues to over articulate, speak slower, and take adequate pauses for replenishing breaths. During automatic speech tasks, pt often loses voice completely (counting to 20, reciting alphabet). Required cues to take a breath before continuing to increase intelligibility. Pt demonstrating dcr awareness of instances of intelligibility.     Education: Discussed strategies with pt and pt's son. Pt's son stating many of these behaviors are not new, however, may be occurring more often. Discussed cueing pt to repeat if he is not understood and encouraging him to slow down when speaking. Discussed giving pt opportunities to participate in conversation about daily events. Pt's son interested in pursuing SLP services at the [facility] upon discharge.     Assessment:     Silvano Parmar is a 88 y.o. male with cognitive communication deficits, expressive language deficits, and dysarthria impacting intelligibility and ability to communicate basic wants and needs independently. Will continue to assess and treat pt while on acute care. Recommend SLP evaluation upon d/c.     Goals:   Multidisciplinary Problems     SLP Goals        Problem: SLP Goal    Goal Priority Disciplines Outcome   SLP Goal     SLP Ongoing (interventions implemented as appropriate)   Description:  " 1. Pt will be able to express basic wants and needs 75% of time in complete and organized sentences given mod assistance from caregiver   2. Pt will be able to monitor rate, volume, and adequate breath support to increase speech intelligibility to 90% given moderate cues from caregivers.   3. Pt will be able to name complex objects with 90% accuracy given min cues.                     Plan:     · Patient to be seen:      · Plan of Care expires:     · Plan of Care reviewed with:  patient, son   · SLP Follow-Up:  Yes       Discharge recommendations:        Time Tracking:     SLP Treatment Date:   04/10/19  Speech Start Time:  0930  Speech Stop Time:  1006     Speech Total Time (min):  36 min    Billable Minutes: Speech Therapy Individual 36 mins    ALESSANDRO Montes-OPRFIRIO  04/10/2019

## 2019-04-10 NOTE — PT/OT/SLP PROGRESS
Physical Therapy Treatment    Patient Name:  Silvano Parmar   MRN:  [id number]    Recommendations:     Discharge Recommendations:  nursing facility, skilled   Discharge Equipment Recommendations: (TBD)   Barriers to discharge: Decreased caregiver support    Assessment:     Silvano Parmar is a 88 y.o. male admitted with a medical diagnosis of TIA (transient ischemic attack).  He presents with the following impairments/functional limitations:  weakness, impaired self care skills, impaired functional mobilty, gait instability, edema, decreased lower extremity function, decreased safety awareness ; pt with improved mobility today, inc. Amb. Distance, though pt needing continuous CGA for safety, occ Lobito for turns.    Rehab Prognosis: Good; patient would benefit from acute skilled PT services to address these deficits and reach maximum level of function.    Recent Surgery: * No surgery found *      Plan:     During this hospitalization, patient to be seen 6 x/week to address the identified rehab impairments via gait training, therapeutic activities, therapeutic exercises, neuromuscular re-education and progress toward the following goals:    · Plan of Care Expires:  04/30/19    Subjective     Chief Complaint: pt had no c/o's pain  Patient/Family Comments/goals: pt agreeable to session, reports he was supposed to go home yesterday  Pain/Comfort:  · Pain Rating 1: 0/10  · Pain Rating Post-Intervention 1: 0/10      Objective:     Communicated with nurse prior to session.  Patient found HOB elevated with peripheral IV, telemetry upon PT entry to room.     General Precautions: Standard, aphasia, diabetic   Orthopedic Precautions:N/A   Braces: N/A     Functional Mobility:  · Bed Mobility:     · Supine to Sit: contact guard assistance, minimum assistance and from almost flat bed, no rails  · Transfers:     · Sit to Stand:  stand by assistance and contact guard assistance with rolling walker  · Gait: amb'd ~100' with RW  and CGA/ occ min.A for turns      AM-PAC 6 CLICK MOBILITY  Turning over in bed (including adjusting bedclothes, sheets and blankets)?: 3  Sitting down on and standing up from a chair with arms (e.g., wheelchair, bedside commode, etc.): 3  Moving from lying on back to sitting on the side of the bed?: 3  Moving to and from a bed to a chair (including a wheelchair)?: 3  Need to walk in hospital room?: 3  Climbing 3-5 steps with a railing?: 2  Basic Mobility Total Score: 17       Therapeutic Activities and Exercises:   pt perf'd seated LE ex's of AP's, LAQ's ; supine QS x 10 ea.     Patient left up in chair with all lines intact, call button in reach, nurse notified and LE's elevated on pillow...    GOALS:   Multidisciplinary Problems     Physical Therapy Goals        Problem: Physical Therapy Goal    Goal Priority Disciplines Outcome Goal Variances Interventions   Physical Therapy Goal     PT, PT/OT      Description:  Goals to be met by: 19    Patient will increase functional independence with mobility by performin. Gait x 50 feet SBA with rolling walker  2. Supine<>sit with SBA without use of hospital bed features.  3. Sit<>stand with demo of safety awareness without VCs and use of RW with SBA.                      Time Tracking:     PT Received On: 04/10/19  PT Start Time: 0850     PT Stop Time: 0918  PT Total Time (min): 28 min     Billable Minutes: Gait Training 18 and Therapeutic Exercise 10    Treatment Type: Treatment  PT/PTA: PTA     PTA Visit Number: 1     Constance Damian PTA  04/10/2019

## 2019-04-10 NOTE — SUBJECTIVE & OBJECTIVE
Interval History: The patient is participating with therapy and has no complaints today.    Review of Systems   All other systems reviewed and are negative.    Objective:     Vital Signs (Most Recent):  Temp: 97.9 °F (36.6 °C) (04/10/19 1227)  Pulse: 73 (04/10/19 1227)  Resp: 18 (04/10/19 1227)  BP: (!) 171/85 (04/10/19 1227)  SpO2: (!) 92 % (04/10/19 1227) Vital Signs (24h Range):  Temp:  [97.5 °F (36.4 °C)-98.6 °F (37 °C)] 97.9 °F (36.6 °C)  Pulse:  [70-80] 73  Resp:  [16-18] 18  SpO2:  [92 %-94 %] 92 %  BP: (133-173)/(68-91) 171/85     Weight: 100.7 kg (222 lb)  Body mass index is 30.96 kg/m².    Intake/Output Summary (Last 24 hours) at 4/10/2019 1442  Last data filed at 4/10/2019 0836  Gross per 24 hour   Intake 1320 ml   Output 1075 ml   Net 245 ml      Physical Exam   Constitutional: He is oriented to person, place, and time. He appears well-developed. No distress.   HENT:   Head: Normocephalic and atraumatic.   Eyes: Conjunctivae and EOM are normal.   Cardiovascular: Normal rate and intact distal pulses.   Pulmonary/Chest: Effort normal. No respiratory distress.   Abdominal: Soft. Bowel sounds are normal. There is no tenderness.   Musculoskeletal: Normal range of motion. He exhibits edema (3+ BLE; minimal erythema of LLE with trace warmth).   Neurological: He is alert and oriented to person, place, and time.   Skin: Skin is warm and dry.   Psychiatric: He has a normal mood and affect. His behavior is normal.   Nursing note and vitals reviewed.      Significant Labs:   CBC:   Recent Labs   Lab 04/09/19  0617 04/10/19  0528   WBC 8.81 6.46   HGB 13.5* 13.4*   HCT 40.9 40.8   * 129*     CMP:   Recent Labs   Lab 04/08/19  2108 04/09/19  0617 04/10/19  0528    139 137   K 3.4* 3.6 3.8    107 106   CO2 21* 23 24   GLU 96 85 114*   BUN 17 18 20   CREATININE 1.1 1.1 1.2   CALCIUM 8.8 8.7 8.9   ANIONGAP 12 9 7*   EGFRNONAA 60 60 54*     Magnesium:   Recent Labs   Lab 04/08/19  2108 04/09/19  0617  04/10/19  0528   MG 1.7 1.6 1.8

## 2019-04-10 NOTE — PT/OT/SLP PROGRESS
Occupational Therapy      Patient Name:  Silvano Parmar   MRN:  [id number]    Patient not seen today secondary to Unavailable (Comment). Pt attempted however still completing his lunch upon arrival. Will follow-up as time allows this PM or tomorrow.    Marissa Franz, OTR/L  4/10/2019

## 2019-04-11 VITALS
BODY MASS INDEX: 31.08 KG/M2 | SYSTOLIC BLOOD PRESSURE: 148 MMHG | RESPIRATION RATE: 20 BRPM | DIASTOLIC BLOOD PRESSURE: 82 MMHG | OXYGEN SATURATION: 92 % | WEIGHT: 222 LBS | HEART RATE: 76 BPM | TEMPERATURE: 99 F | HEIGHT: 71 IN

## 2019-04-11 PROBLEM — L03.90 CELLULITIS: Status: RESOLVED | Noted: 2019-04-08 | Resolved: 2019-04-11

## 2019-04-11 LAB
ANION GAP SERPL CALC-SCNC: 8 MMOL/L (ref 8–16)
BASOPHILS # BLD AUTO: 0.02 K/UL (ref 0–0.2)
BASOPHILS NFR BLD: 0.3 % (ref 0–1.9)
BUN SERPL-MCNC: 19 MG/DL (ref 8–23)
CALCIUM SERPL-MCNC: 9.5 MG/DL (ref 8.7–10.5)
CHLORIDE SERPL-SCNC: 104 MMOL/L (ref 95–110)
CO2 SERPL-SCNC: 26 MMOL/L (ref 23–29)
CREAT SERPL-MCNC: 1 MG/DL (ref 0.5–1.4)
DIFFERENTIAL METHOD: ABNORMAL
EOSINOPHIL # BLD AUTO: 0.4 K/UL (ref 0–0.5)
EOSINOPHIL NFR BLD: 6.9 % (ref 0–8)
ERYTHROCYTE [DISTWIDTH] IN BLOOD BY AUTOMATED COUNT: 14.5 % (ref 11.5–14.5)
EST. GFR  (AFRICAN AMERICAN): >60 ML/MIN/1.73 M^2
EST. GFR  (NON AFRICAN AMERICAN): >60 ML/MIN/1.73 M^2
GLUCOSE SERPL-MCNC: 95 MG/DL (ref 70–110)
HCT VFR BLD AUTO: 40.9 % (ref 40–54)
HGB BLD-MCNC: 13.7 G/DL (ref 14–18)
LYMPHOCYTES # BLD AUTO: 0.9 K/UL (ref 1–4.8)
LYMPHOCYTES NFR BLD: 13.8 % (ref 18–48)
MAGNESIUM SERPL-MCNC: 1.7 MG/DL (ref 1.6–2.6)
MCH RBC QN AUTO: 32.2 PG (ref 27–31)
MCHC RBC AUTO-ENTMCNC: 33.5 G/DL (ref 32–36)
MCV RBC AUTO: 96 FL (ref 82–98)
MONOCYTES # BLD AUTO: 0.6 K/UL (ref 0.3–1)
MONOCYTES NFR BLD: 9.3 % (ref 4–15)
NEUTROPHILS # BLD AUTO: 4.4 K/UL (ref 1.8–7.7)
NEUTROPHILS NFR BLD: 69.4 % (ref 38–73)
PHOSPHATE SERPL-MCNC: 3.9 MG/DL (ref 2.7–4.5)
PLATELET # BLD AUTO: 131 K/UL (ref 150–350)
PMV BLD AUTO: 11.1 FL (ref 9.2–12.9)
POCT GLUCOSE: 140 MG/DL (ref 70–110)
POTASSIUM SERPL-SCNC: 3.5 MMOL/L (ref 3.5–5.1)
RBC # BLD AUTO: 4.26 M/UL (ref 4.6–6.2)
SODIUM SERPL-SCNC: 138 MMOL/L (ref 136–145)
WBC # BLD AUTO: 6.36 K/UL (ref 3.9–12.7)

## 2019-04-11 PROCEDURE — 97116 GAIT TRAINING THERAPY: CPT

## 2019-04-11 PROCEDURE — 84100 ASSAY OF PHOSPHORUS: CPT

## 2019-04-11 PROCEDURE — 80048 BASIC METABOLIC PNL TOTAL CA: CPT

## 2019-04-11 PROCEDURE — 94761 N-INVAS EAR/PLS OXIMETRY MLT: CPT

## 2019-04-11 PROCEDURE — 85025 COMPLETE CBC W/AUTO DIFF WBC: CPT

## 2019-04-11 PROCEDURE — 36415 COLL VENOUS BLD VENIPUNCTURE: CPT

## 2019-04-11 PROCEDURE — 97535 SELF CARE MNGMENT TRAINING: CPT

## 2019-04-11 PROCEDURE — 83735 ASSAY OF MAGNESIUM: CPT

## 2019-04-11 PROCEDURE — 99239 HOSP IP/OBS DSCHRG MGMT >30: CPT | Mod: ,,, | Performed by: HOSPITALIST

## 2019-04-11 PROCEDURE — 97110 THERAPEUTIC EXERCISES: CPT

## 2019-04-11 PROCEDURE — 99239 PR HOSPITAL DISCHARGE DAY,>30 MIN: ICD-10-PCS | Mod: ,,, | Performed by: HOSPITALIST

## 2019-04-11 PROCEDURE — 25000003 PHARM REV CODE 250: Performed by: INTERNAL MEDICINE

## 2019-04-11 RX ORDER — ASPIRIN 81 MG/1
81 TABLET ORAL DAILY
Refills: 0 | Status: ON HOLD | COMMUNITY
Start: 2019-04-11 | End: 2019-12-17 | Stop reason: HOSPADM

## 2019-04-11 RX ADMIN — DILTIAZEM HYDROCHLORIDE 120 MG: 120 CAPSULE, COATED, EXTENDED RELEASE ORAL at 09:04

## 2019-04-11 RX ADMIN — TAMSULOSIN HYDROCHLORIDE 0.4 MG: 0.4 CAPSULE ORAL at 09:04

## 2019-04-11 RX ADMIN — GABAPENTIN 300 MG: 300 CAPSULE ORAL at 09:04

## 2019-04-11 RX ADMIN — CLINDAMYCIN HYDROCHLORIDE 450 MG: 150 CAPSULE ORAL at 01:04

## 2019-04-11 RX ADMIN — CLINDAMYCIN HYDROCHLORIDE 450 MG: 150 CAPSULE ORAL at 05:04

## 2019-04-11 RX ADMIN — CARBIDOPA AND LEVODOPA 2 TABLET: 25; 100 TABLET ORAL at 09:04

## 2019-04-11 RX ADMIN — LOSARTAN POTASSIUM 50 MG: 50 TABLET, FILM COATED ORAL at 09:04

## 2019-04-11 RX ADMIN — ASPIRIN 325 MG: 325 TABLET, DELAYED RELEASE ORAL at 09:04

## 2019-04-11 RX ADMIN — PRAMIPEXOLE DIHYDROCHLORIDE 1 MG: 0.5 TABLET ORAL at 09:04

## 2019-04-11 NOTE — PLAN OF CARE
Problem: Adult Inpatient Plan of Care  Goal: Plan of Care Review  Outcome: Outcome(s) achieved Date Met: 04/11/19  Eager & in agreement w/ DC. VU of DC instructions-- paperwork passed & explained. Daughter and son at bedside. HH will be set up at [facility] pt going to. To be DCd to [facility] w/ family-- will be escorted downstairs via  transport team once dressed and ready. Free from falls, injury, or skin breakdown this hospital admission.

## 2019-04-11 NOTE — PT/OT/SLP PROGRESS
Physical Therapy Treatment    Patient Name:  Silvano Parmar   MRN:  [id number]    Recommendations:     Discharge Recommendations:  nursing facility, skilled((however if pt goes back to his assisted living facility, will need 24/7 assist/supervision and HHPT/OT)   Discharge Equipment Recommendations: (TBD at next level of care)   Barriers to discharge: Decreased caregiver support    Assessment:     Silvano Parmar is a 88 y.o. male admitted with a medical diagnosis of TIA (transient ischemic attack).  He presents with the following impairments/functional limitations:  weakness, impaired endurance, impaired self care skills, impaired functional mobilty, gait instability, impaired balance, decreased lower extremity function, decreased safety awareness, impaired coordination, edema, impaired cognition ;pt with fair mobility today, used pt's own rollator which is specifically designed for Parkinson's pt's, .    Rehab Prognosis: Good; patient would benefit from acute skilled PT services to address these deficits and reach maximum level of function.    Recent Surgery: * No surgery found *      Plan:     During this hospitalization, patient to be seen 6 x/week to address the identified rehab impairments via gait training, therapeutic activities, therapeutic exercises, neuromuscular re-education and progress toward the following goals:    · Plan of Care Expires:  04/30/19    Subjective     Chief Complaint: none stated  Patient/Family Comments/goals: pt agreeable to getting back up (had OT this morning and was up in chair, though back in bed now). Pt's son, from out of town, is present.   Pain/Comfort:  · Pain Rating 1: 0/10  · Pain Rating Post-Intervention 1: 0/10      Objective:     Communicated with nurse prior to session.  Patient found supine with Condom Catheter, peripheral IV, telemetry upon PT entry to room.     General Precautions: Standard, fall, aspiration, aphasia, hearing impaired   Orthopedic  "Precautions:N/A   Braces: N/A     Functional Mobility:  · Bed Mobility:     · Supine to Sit: minimum assistance and pt using railing  · Transfers:     · Sit to Stand:  contact guard assistance and minimum assistance with pt's own rollator  · Gait: amb'd ~110' with rollator and CGA/occ Lobito, cueing to slow down      AM-PAC 6 CLICK MOBILITY  Turning over in bed (including adjusting bedclothes, sheets and blankets)?: 3  Sitting down on and standing up from a chair with arms (e.g., wheelchair, bedside commode, etc.): 3  Moving from lying on back to sitting on the side of the bed?: 3  Moving to and from a bed to a chair (including a wheelchair)?: 3  Need to walk in hospital room?: 3  Climbing 3-5 steps with a railing?: 2  Basic Mobility Total Score: 17       Therapeutic Activities and Exercises:   pt perf'd seated AP's, hip flex, LAQ"s x 10 ea.     Patient left up in chair with all lines intact, call button in reach, nurse notified and son present.LE's elevated      GOALS:   Multidisciplinary Problems     Physical Therapy Goals        Problem: Physical Therapy Goal    Goal Priority Disciplines Outcome Goal Variances Interventions   Physical Therapy Goal     PT, PT/OT      Description:  Goals to be met by: 19    Patient will increase functional independence with mobility by performin. Gait x 50 feet SBA with rolling walker  2. Supine<>sit with SBA without use of hospital bed features.  3. Sit<>stand with demo of safety awareness without VCs and use of RW with SBA.                      Time Tracking:     PT Received On: 19  PT Start Time: 943     PT Stop Time: 0  PT Total Time (min): 27 min     Billable Minutes: Gait Training 17 and Therapeutic Exercise 10    Treatment Type: Treatment  PT/PTA: PTA     PTA Visit Number: 2     Constance Damian PTA  2019  "

## 2019-04-11 NOTE — DISCHARGE SUMMARY
[facility] Hospital Medicine  Discharge Summary      Patient Name: Silvano Parmar  MRN: [id number]  Admission Date: 4/8/2019  Hospital Length of Stay: 3 days  Discharge Date and Time:  04/11/2019 9:45 AM  Attending Physician: Alfreda Alfredo MD   Discharging Provider: Alfreda Alfredo MD  Primary Care Provider: Silvestre Ramirez MD      HPI:   Mr. Parmar is an 88/M with PMH HTN, HLD, DMII, A-Fib, Parkinson's who presented to ED 04/08 with a several hour history of confusion. History was obtained from the patient's family members as he has some confusion at baseline. They report that he was slightly confused upon being picked up from [facility] for lunch. His son had stated to ED staff that the patient had difficulty climbing stairs, which was new for him, as well as unsteady gait. They deny any fever, chills, SOB, cough, CP, palpitations. They do note some increased BLE swelling and mild erythema of the LLE. They had no other concerns at time of interview.    * No surgery found *      Hospital Course:   The patient had a CT scan of the head done which showed no acute intracranial abnormalities.  Carotid Doppler ultrasound bilaterally showed no evidence of hemodynamically significant stenosis.  A venous Doppler ultrasound of the bilateral lower extremities was done and showed no evidence of a DVT.  Chest x-ray showed possible signs of mild pulmonary edema with some patchy opacifications bilaterally. PT/OT were consulted and the patient was resumed on his Parkinson's medications and ambulatory status did improve with assistance.  The patient had some mild erythema to the left lower extremity and was treated with IV antibiotics of Zosyn and subsequently clindamycin.  Erythema completely resolved and antibiotics were discontinued.  The patient will be discharged back to assisted living facility with home health for ADLs, PT/OT, and medication review. The patient is instructed to follow up with his  primary care provider within 1 week.     Consults:   Consults (From admission, onward)        Status Ordering Provider     Inpatient consult to Stroke Telemedicine  Once     Provider:  Jesu Dao MD    Acknowledged BROOKE COKER            Final Active Diagnoses:    Diagnosis Date Noted POA    PRINCIPAL PROBLEM:  TIA (transient ischemic attack) [G45.9] 04/08/2019 Yes    Debility [R53.81] 04/09/2019 Yes    Chronic idiopathic thrombocytopenia [D69.3] 11/09/2018 Yes    Essential hypertension [I10] 10/13/2014 Yes     Chronic    Benign prostatic hyperplasia [N40.0] 10/13/2014 Yes     Chronic    Type 2 diabetes mellitus, without long-term current use of insulin [E11.9] 05/28/2013 Yes     Chronic    Persistent atrial fibrillation [I48.1] 11/27/2012 Yes    Parkinson disease [G20] 11/27/2012 Yes     Chronic      Problems Resolved During this Admission:    Diagnosis Date Noted Date Resolved POA    Cellulitis [L03.90] 04/08/2019 04/11/2019 Yes    Hyperlipemia [E78.5] 11/27/2012 04/09/2019 Yes     Chronic       Discharged Condition: good    [place] [facility]    Follow Up:  Follow-up Information     Silvestre Ramirez MD In 1 week.    Specialty:  Internal Medicine  Contact information:  [address] [place] [address]  [phone]                 Patient Instructions:      Diet diabetic     Activity as tolerated       Significant Diagnostic Studies: Labs:   CMP   Recent Labs   Lab 04/10/19  0528 04/11/19  0451    138   K 3.8 3.5    104   CO2 24 26   * 95   BUN 20 19   CREATININE 1.2 1.0   CALCIUM 8.9 9.5   ANIONGAP 7* 8   ESTGFRAFRICA >60 >60   EGFRNONAA 54* >60    and CBC   Recent Labs   Lab 04/10/19  0528 04/11/19  0451   WBC 6.46 6.36   HGB 13.4* 13.7*   HCT 40.8 40.9   * 131*     Microbiology:   Blood Culture   Lab Results   Component Value Date    LABBLOO No Growth to date 04/08/2019    LABBLOO No Growth to date 04/08/2019    LABBLOO No Growth to  date 04/08/2019       Pending Diagnostic Studies:     None         Medications:  Reconciled Home Medications:      Medication List      CHANGE how you take these medications    aspirin 81 MG EC tablet  Commonly known as:  ECOTRIN  Take 1 tablet (81 mg total) by mouth once daily.  What changed:    · medication strength  · how much to take        CONTINUE taking these medications    carbidopa-levodopa  mg  mg per tablet  Commonly known as:  SINEMET  Take 2 tablets by mouth 3 (three) times daily.     CENTRUM SILVER ORAL  Take 1 tablet by mouth once daily.     diltiaZEM 120 MG Cp24  Commonly known as:  CARDIZEM CD  TAKE ONE CAPSULE BY MOUTH ONCE DAILY     esomeprazole 40 MG capsule  Commonly known as:  NEXIUM  Take 22.3 mg by mouth before breakfast.     fluticasone 50 mcg/actuation nasal spray  Commonly known as:  FLONASE  1 spray (50 mcg total) by Each Nare route once daily.     furosemide 20 MG tablet  Commonly known as:  LASIX  TAKE 1 TABLET BY MOUTH EVERY DAY     gabapentin 300 MG capsule  Commonly known as:  NEURONTIN  Take 1 capsule (300 mg total) by mouth 2 (two) times daily.     GENERLAC 10 gram/15 mL solution  Generic drug:  lactulose  Take 10 g by mouth daily as needed.     guaifenesin 100 mg/5 ml 100 mg/5 mL syrup  Commonly known as:  ROBITUSSIN  Take 200 mg by mouth every 4 (four) hours as needed for Cough.     losartan 50 MG tablet  Commonly known as:  COZAAR  TAKE 1 TABLET(50 MG) BY MOUTH EVERY DAY     meloxicam 15 MG tablet  Commonly known as:  MOBIC  Take 15 mg by mouth daily as needed for Pain.     pramipexole 1 MG tablet  Commonly known as:  MIRAPEX  Take 1 tablet by mouth 3 (three) times daily.     SITagliptin 50 MG Tab  Commonly known as:  JANUVIA  Take 1 tablet (50 mg total) by mouth once daily.     tamsulosin 0.4 mg Cap  Commonly known as:  FLOMAX  Take 1 capsule (0.4 mg total) by mouth once daily.     VITAMIN D3 4,000 unit Cap  Generic drug:  cholecalciferol (vitamin D3)  Take 1  capsule by mouth once daily.            Indwelling Lines/Drains at time of discharge:   Lines/Drains/Airways     Drain            Male External Urinary Catheter 11/09/18 2000 152 days                Time spent on the discharge of patient: 35 minutes  Patient was seen and examined on the date of discharge and determined to be suitable for discharge.         Alfreda Alfredo MD  Department of Hospital Medicine  [facility]

## 2019-04-11 NOTE — PLAN OF CARE
Home Health orders & discharge summary faxed to Mikayla CHRISTINE at [facility]. Mikayla will set up through their in house home health agency ([facility]) - copy provided to patient

## 2019-04-11 NOTE — PLAN OF CARE
Problem: Occupational Therapy Goal  Goal: Occupational Therapy Goal  Goals to be met by: 4/17/2019     Patient will increase functional independence with ADLs by performing:    UE Dressing with Minimal Assistance. -MET 4/11/19  REVISED GOAL: UB Dressing with SBA  LE Dressing with Maximum Assistance. -MET 4/11/19  REVISED GOAL: LB dressing with Moderate Assistance   Grooming while seated with Stand-by Assistance.  Toileting from bedside commode with Moderate Assistance for hygiene and clothing management.   Toilet transfer to bedside commode with Minimal Assistance. - MET 4/11/19 (simulated)  REVISED GOAL: Toilet transfer to bedside commode with SBA      Outcome: Ongoing (interventions implemented as appropriate)  Pt is making steady progress towards his OT goals. Pt met x3 goals this date. Goals have been revised to reflect his current functional level. Continued goal date of 4/17/19.     Marissa Franz, OTR/L  4/11/2019

## 2019-04-11 NOTE — PT/OT/SLP PROGRESS
Occupational Therapy   Treatment    Name: Silvano Parmar  MRN: [id number]  Admitting Diagnosis:  TIA (transient ischemic attack)       Recommendations:     Discharge Recommendations: nursing facility, skilled  Discharge Equipment Recommendations:  (TBD pending progress at next phase of care)  Barriers to discharge:  Decreased caregiver support(lives alone in independent apartment within [facility] )    Assessment:     Silvano Parmar is a 88 y.o. male with a medical diagnosis of TIA (transient ischemic attack).  He presents with the following performance deficits affecting his function: weakness, impaired endurance, impaired self care skills, impaired functional mobilty, gait instability, impaired balance, impaired cognition, decreased safety awareness, visual deficits, edema, impaired cardiopulmonary response to activity, decreased coordination.     Pt is making steady progress towards his OT goals. Notable improvements in his independence with LB dressing, although still requires max (A) in it's entirety and mild SOB associated with task. Feel that pt could possibly benefit from LB dressing AE for improved ease and safety with dressing, however unsure if there would be carry-over following education. Will assess tomorrow. Improvements also noted with UB dressing. Pt required CGA- min (A) for functional mobility and transfers using RW. Increased unsteadiness and swaying observed during ADLs with no UE, requiring min (A) for standing balance. Pt able to follow simple, one-step commands with 100% accuracy. Does still require cues for initiation and sequencing of familiar ADL tasks. Occasional unintelligible speech, but usually able to correct once cued to repeat himself. Impaired B UE coordination and mild motor planning deficits observed during all functional tasks. Remains high fall risk. Continue to recommend SNF upon D/C to maximize pt's safety and return to PLOF. Will defer DME needs until next phase of  care.       Rehab Prognosis:  Fair; patient would benefit from acute skilled OT services to address these deficits and reach maximum level of function.       Plan:     Patient to be seen 5 x/week to address the above listed problems via self-care/home management, therapeutic activities, therapeutic exercises  · Plan of Care Expires: 05/09/19  · Plan of Care Reviewed with: patientkeaton    Subjective     Pain/Comfort:  · Pain Rating 1: 0/10  · Pain Rating Post-Intervention 1: 0/10    Objective:     Communicated with: RN prior to session.  Patient found supine with Condom Catheter, peripheral IV, telemetry upon OT entry to room.    General Precautions: Standard, fall, aspiration, aphasia   Orthopedic Precautions:N/A   Braces: N/A     Occupational Performance:     Bed Mobility:    · Patient completed Rolling/Turning to Right with minimum assistance  · Patient completed Scooting/Bridging with contact guard assistance  · Patient completed Supine to Sit with minimum assistance     Functional Mobility/Transfers:  · Patient completed Sit <> Stand Transfer with contact guard assistance  with  rolling walker   · Patient completed Bed <> Chair Transfer using Step Transfer technique with contact guard assistance with rolling walker  · Functional Mobility: house-hold level using RW with CGA-min (A). No LOB, however unsteadiness especially noted during turns.     Activities of Daily Living:  · Grooming: contact guard assistance and minimum assistance for static standing balance pending UE support; (A) required to open containers   · Upper Body Dressing: minimum assistance incidental (A) to pull across his backside; cues for inititation and sequencing of task   · Lower Body Dressing: maximal assistance to don bilateral socks while seated EOB- required therapist to initiate sock over toes and assist to assume and sustain crossed leg position; increased SOB with required rest break needed during task; discussed use of sock aide- pt  interested.       [facility] 6 Click ADL: 15    Treatment & Education:  OT role and POC, technique for bed mobility and transfers using RW, safety during ADLs, LB dressing techniques- possible benefit from LB dressing AE, updated son on patient's progress and DC planning    Patient left up in chair with all lines intact, call button in reach, RN notified and son presentEducation:      GOALS:   Multidisciplinary Problems     Occupational Therapy Goals        Problem: Occupational Therapy Goal    Goal Priority Disciplines Outcome Interventions   Occupational Therapy Goal     OT, PT/OT Ongoing (interventions implemented as appropriate)    Description:  Goals to be met by: 4/17/2019     Patient will increase functional independence with ADLs by performing:    UE Dressing with Minimal Assistance. -MET 4/11/19  REVISED GOAL: UB Dressing with SBA  LE Dressing with Maximum Assistance. -MET 4/11/19  REVISED GOAL: LB dressing with Moderate Assistance   Grooming while seated with Stand-by Assistance.  Toileting from bedside commode with Moderate Assistance for hygiene and clothing management.   Toilet transfer to bedside commode with Minimal Assistance. - MET 4/11/19 (simulated)  REVISED GOAL: Toilet transfer to bedside commode with SBA                        Time Tracking:     OT Date of Treatment: 04/11/19  OT Start Time: 0706  OT Stop Time: 0745  OT Total Time (min): 39 min    Billable Minutes:Self Care/Home Management 39    Marissa Franz OTR/TERRY  4/11/2019

## 2019-04-11 NOTE — PLAN OF CARE
Problem: Physical Therapy Goal  Goal: Physical Therapy Goal  Goals to be met by: 19    Patient will increase functional independence with mobility by performin. Gait x 50 feet SBA with rolling walker  2. Supine<>sit with SBA without use of hospital bed features.  3. Sit<>stand with demo of safety awareness without VCs and use of RW with SBA.     Pt progressing towards goals, sup to sit min.A with use of bedrail, sit to stand CGA/min.A, amb'd 110' with rollator and CGA/occ min.A, cueing for safety. Recommend cont PT in SNF, however, if pt goes home (assisted living facility) will need 24/ assist/supervision and HHPT initially.

## 2019-04-11 NOTE — PLAN OF CARE
A so so you know that your findings this is so year consult    [facility]    HOME HEALTH ORDERS  FACE TO FACE ENCOUNTER    Patient Name: Silvano Parmar  YOB: 1930    PCP: Silvestre Ramirez MD   PCP Address: [address] [place] [address] / [facility] [address]  PCP Phone Number: [phone]  PCP Fax: [phone]       Encounter Date: 04/11/2019    Admit to Home Health    Diagnoses:  Active Hospital Problems    Diagnosis  POA    *TIA (transient ischemic attack) [G45.9]  Yes    Debility [R53.81]  Yes    Chronic idiopathic thrombocytopenia [D69.3]  Yes    Essential hypertension [I10]  Yes     Chronic    Benign prostatic hyperplasia [N40.0]  Yes     Chronic    Type 2 diabetes mellitus, without long-term current use of insulin [E11.9]  Yes     Chronic    Persistent atrial fibrillation [I48.1]  Yes    Parkinson disease [G20]  Yes     Chronic      Resolved Hospital Problems    Diagnosis Date Resolved POA    Cellulitis [L03.90] 04/11/2019 Yes    Hyperlipemia [E78.5] 04/09/2019 Yes     Chronic       Future Appointments   Date Time Provider Department Center   4/12/2019 10:00 AM Silvestre Ramirez MD [facility]   6/19/2019  8:00 AM Grey Sue DPM [facility]     Follow-up Information     Silvestre Ramirez MD In 1 week.    Specialty:  Internal Medicine  Contact information:  [address] [place] [id number] [phone]                 I have seen and examined this patient face to face today. My clinical findings that support the need for the home health skilled services and home bound status are the following:  Weakness/numbness causing balance and gait disturbance due to Weakness/Debility and Parkinsons making it taxing to leave home.  Requiring assistive device to leave home due to unsteady gait caused by  Weakness/Debility, Anemia and Parkinsons.    Allergies:  Review of patient's allergies indicates:   Allergen Reactions    Demerol [meperidine]         Diet: diabetic diet: 2000 calorie    Activities: activity as tolerated    Nursing:   SN to complete comprehensive assessment including routine vital signs. Instruct on disease process and s/s of complications to report to MD. Review/verify medication list sent home with the patient at time of discharge  and instruct patient/caregiver as needed. Frequency may be adjusted depending on start of care date.    Notify MD if SBP > 160 or < 90; DBP > 90 or < 50; HR > 120 or < 50; Temp > 101; Other:         CONSULTS:    Physical Therapy to evaluate and treat. Evaluate for home safety and equipment needs; Establish/upgrade home exercise program. Perform / instruct on therapeutic exercises, gait training, transfer training, and Range of Motion.  Occupational Therapy to evaluate and treat. Evaluate home environment for safety and equipment needs. Perform/Instruct on transfers, ADL training, ROM, and therapeutic exercises.  Aide to provide assistance with personal care, ADLs, and vital signs.    MISCELLANEOUS CARE:  Diabetic Care:   SN to perform and educate Diabetic management with blood glucose monitoring: and Report CBG < 60 or > 350 to physician.    WOUND CARE ORDERS  n/a      Medications: Review discharge medications with patient and family and provide education.      Current Discharge Medication List      CONTINUE these medications which have CHANGED    Details   aspirin (ECOTRIN) 81 MG EC tablet Take 1 tablet (81 mg total) by mouth once daily.  Refills: 0         CONTINUE these medications which have NOT CHANGED    Details   carbidopa-levodopa  mg (SINEMET)  mg per tablet Take 2 tablets by mouth 3 (three) times daily.       cholecalciferol, vitamin D3, (VITAMIN D3) 4,000 unit Cap Take 1 capsule by mouth once daily.       diltiaZEM (CARDIZEM CD) 120 MG Cp24 TAKE ONE CAPSULE BY MOUTH ONCE DAILY  Qty: 90 capsule, Refills: 3    Associated Diagnoses: Essential hypertension      esomeprazole (NEXIUM) 40 MG  capsule Take 22.3 mg by mouth before breakfast.       fluticasone (FLONASE) 50 mcg/actuation nasal spray 1 spray (50 mcg total) by Each Nare route once daily.  Qty: 16 g, Refills: 11      FOLIC ACID/MULTIVITS-MIN/LUT (CENTRUM SILVER ORAL) Take 1 tablet by mouth once daily.      furosemide (LASIX) 20 MG tablet TAKE 1 TABLET BY MOUTH EVERY DAY  Qty: 90 tablet, Refills: 0      gabapentin (NEURONTIN) 300 MG capsule Take 1 capsule (300 mg total) by mouth 2 (two) times daily.  Qty: 60 capsule, Refills: 6      GENERLAC 10 gram/15 mL solution Take 10 g by mouth daily as needed.       guaifenesin 100 mg/5 ml (ROBITUSSIN) 100 mg/5 mL syrup Take 200 mg by mouth every 4 (four) hours as needed for Cough.      losartan (COZAAR) 50 MG tablet TAKE 1 TABLET(50 MG) BY MOUTH EVERY DAY  Qty: 30 tablet, Refills: 6    Comments: This prescription was filled on 12/22/2018. Any refills authorized will be placed on file.      meloxicam (MOBIC) 15 MG tablet Take 15 mg by mouth daily as needed for Pain.      pramipexole (MIRAPEX) 1 MG tablet Take 1 tablet by mouth 3 (three) times daily.      SITagliptan (JANUVIA) 50 MG Tab Take 1 tablet (50 mg total) by mouth once daily.  Qty: 90 tablet, Refills: 3      tamsulosin (FLOMAX) 0.4 mg Cp24 Take 1 capsule (0.4 mg total) by mouth once daily.  Qty: 90 capsule, Refills: 3    Associated Diagnoses: Benign non-nodular prostatic hyperplasia with lower urinary tract symptoms              I certify that this patient is confined to his home and needs intermittent skilled nursing care, physical therapy and occupational therapy.

## 2019-04-11 NOTE — PLAN OF CARE
Problem: Adult Inpatient Plan of Care  Goal: Plan of Care Review  Outcome: Ongoing (interventions implemented as appropriate)  No significant events this shift. Remains free from fall, injury, and skin breakdown. Condom cath in place. VSS stable on RA and afebrile. Positions self independently. No c/o pain this shift. Neuro checks WDL. Tolerating ordered diet. IV site WNL. Plan of care reviewed with patient and all questions answered. Bed low, locked w/ bed alarm on. Call light within reach. Purposeful rounding performed. No other complaints at this time.

## 2019-04-11 NOTE — PT/OT/SLP DISCHARGE
Occupational Therapy Discharge Summary    Silvano Parmar  MRN: [id number]   Principal Problem: TIA (transient ischemic attack)      Patient Discharged from acute Occupational Therapy on 4/11/2019.  Please refer to prior OT note dated 4/11/2019 for functional status.    Assessment:      Goals partially met.    Objective:     GOALS:   Multidisciplinary Problems     Occupational Therapy Goals        Problem: Occupational Therapy Goal    Goal Priority Disciplines Outcome Interventions   Occupational Therapy Goal     OT, PT/OT Ongoing (interventions implemented as appropriate)    Description:  Goals to be met by: 4/17/2019     Patient will increase functional independence with ADLs by performing:    UE Dressing with Minimal Assistance. -MET 4/11/19  REVISED GOAL: UB Dressing with SBA  LE Dressing with Maximum Assistance. -MET 4/11/19  REVISED GOAL: LB dressing with Moderate Assistance   Grooming while seated with Stand-by Assistance.  Toileting from bedside commode with Moderate Assistance for hygiene and clothing management.   Toilet transfer to bedside commode with Minimal Assistance. - MET 4/11/19 (simulated)  REVISED GOAL: Toilet transfer to bedside commode with SBA                        Reasons for Discontinuation of Therapy Services  Transfer to alternate level of care.      Plan:     Patient Discharged to: Home with Home Health Service; Therapy recommending SNF.     Marissa Franz, OTR/L  4/11/2019

## 2019-04-11 NOTE — PT/OT/SLP DISCHARGE
Physical Therapy Discharge Summary    Name: Silvano Parmar  MRN: [id number]   Principal Problem: TIA (transient ischemic attack)     Patient Discharged from acute Physical Therapy on 19.  Please refer to prior PT noted date on 19 for functional status.     Assessment:     Patient appropriate for care in another setting. Patient has not met goals.    Objective:     GOALS:   Multidisciplinary Problems     Physical Therapy Goals        Problem: Physical Therapy Goal    Goal Priority Disciplines Outcome Goal Variances Interventions   Physical Therapy Goal     PT, PT/OT      Description:  Goals to be met by: 19    Patient will increase functional independence with mobility by performin. Gait x 50 feet SBA with rolling walker  2. Supine<>sit with SBA without use of hospital bed features.  3. Sit<>stand with demo of safety awareness without VCs and use of RW with SBA.                      Reasons for Discontinuation of Therapy Services  Transfer to alternate level of care.      Plan:     Patient Discharged to: Home with Home Health Service. Pt recommendation for d/c to SNF or home to assisted living with HH PT/OT and  assistance.    Elisha Sawyer, PT  2019

## 2019-04-13 LAB
BACTERIA BLD CULT: NORMAL
BACTERIA BLD CULT: NORMAL

## 2019-05-29 ENCOUNTER — OFFICE VISIT (OUTPATIENT)
Dept: UROLOGY | Facility: CLINIC | Age: 84
End: 2019-05-29
Attending: UROLOGY
Payer: MEDICARE

## 2019-05-29 VITALS
HEART RATE: 70 BPM | SYSTOLIC BLOOD PRESSURE: 147 MMHG | HEIGHT: 72 IN | DIASTOLIC BLOOD PRESSURE: 80 MMHG | WEIGHT: 214.75 LBS | BODY MASS INDEX: 29.09 KG/M2

## 2019-05-29 DIAGNOSIS — N32.81 OAB (OVERACTIVE BLADDER): ICD-10-CM

## 2019-05-29 DIAGNOSIS — N40.1 BENIGN NON-NODULAR PROSTATIC HYPERPLASIA WITH LOWER URINARY TRACT SYMPTOMS: Primary | ICD-10-CM

## 2019-05-29 PROCEDURE — 99214 OFFICE O/P EST MOD 30 MIN: CPT | Mod: S$GLB,,, | Performed by: UROLOGY

## 2019-05-29 PROCEDURE — 99214 PR OFFICE/OUTPT VISIT, EST, LEVL IV, 30-39 MIN: ICD-10-PCS | Mod: S$GLB,,, | Performed by: UROLOGY

## 2019-05-29 RX ORDER — TAMSULOSIN HYDROCHLORIDE 0.4 MG/1
0.4 CAPSULE ORAL DAILY
Qty: 90 CAPSULE | Refills: 3 | Status: SHIPPED | OUTPATIENT
Start: 2019-05-29 | End: 2020-03-25 | Stop reason: HOSPADM

## 2019-05-29 NOTE — PROGRESS NOTES
Subjective:      Silvano Parmar is a 88 y.o. male who returns today regarding his BPH.    He previously saw Dr. Santo.     PMHx significant for Parkinson's disease.     He is on flomax for BPH.     Today he reports increased incontinence. He now wears a pad daily, which is regularly wet. He usually has accidents after strong urge if he can't quickly get to the bathroom.    The following portions of the patient's history were reviewed and updated as appropriate: allergies, current medications, past family history, past medical history, past social history, past surgical history and problem list.    Review of Systems  A comprehensive multipoint review of systems was negative except as otherwise stated in the HPI.     Objective:   Vitals: BP (!) 147/80 (BP Location: Left arm, Patient Position: Sitting, BP Method: Large (Automatic))   Pulse 70   Ht 6' (1.829 m)   Wt 97.4 kg (214 lb 11.7 oz)   BMI 29.12 kg/m²     Physical Exam   General: alert and oriented, no acute distress  Respiratory: Symmetric expansion, non-labored breathing  Neuro: no gross deficits  Psych: normal judgment and insight, normal mood/affect and non-anxious    Bladder Scan PVR: 19cc      Lab Review   Urinalysis demonstrates negative for all components  Lab Results   Component Value Date    WBC 6.36 04/11/2019    HGB 13.7 (L) 04/11/2019    HCT 40.9 04/11/2019    MCV 96 04/11/2019     (L) 04/11/2019     Lab Results   Component Value Date    CREATININE 1.0 04/11/2019    BUN 19 04/11/2019       Assessment and Plan:   1. Benign non-nodular prostatic hyperplasia with lower urinary tract symptoms  -- Continue flomax    2. OAB (overactive bladder)  -- Will trial myrbetriq    FU 3 mos

## 2019-06-19 ENCOUNTER — OFFICE VISIT (OUTPATIENT)
Dept: PODIATRY | Facility: CLINIC | Age: 84
End: 2019-06-19
Payer: MEDICARE

## 2019-06-19 VITALS
HEART RATE: 71 BPM | SYSTOLIC BLOOD PRESSURE: 140 MMHG | HEIGHT: 72 IN | DIASTOLIC BLOOD PRESSURE: 82 MMHG | WEIGHT: 214 LBS | BODY MASS INDEX: 28.99 KG/M2

## 2019-06-19 DIAGNOSIS — M20.12 VALGUS DEFORMITY OF BOTH GREAT TOES: ICD-10-CM

## 2019-06-19 DIAGNOSIS — L84 CORN OR CALLUS: ICD-10-CM

## 2019-06-19 DIAGNOSIS — M20.42 HAMMER TOES OF BOTH FEET: ICD-10-CM

## 2019-06-19 DIAGNOSIS — I87.2 VENOUS INSUFFICIENCY: ICD-10-CM

## 2019-06-19 DIAGNOSIS — M20.41 HAMMER TOES OF BOTH FEET: ICD-10-CM

## 2019-06-19 DIAGNOSIS — R53.81 DEBILITY: ICD-10-CM

## 2019-06-19 DIAGNOSIS — B35.1 ONYCHOMYCOSIS DUE TO DERMATOPHYTE: ICD-10-CM

## 2019-06-19 DIAGNOSIS — E11.42 DIABETIC PERIPHERAL NEUROPATHY ASSOCIATED WITH TYPE 2 DIABETES MELLITUS: Primary | ICD-10-CM

## 2019-06-19 DIAGNOSIS — G45.9 TIA (TRANSIENT ISCHEMIC ATTACK): ICD-10-CM

## 2019-06-19 DIAGNOSIS — M20.11 VALGUS DEFORMITY OF BOTH GREAT TOES: ICD-10-CM

## 2019-06-19 DIAGNOSIS — G20.A1 PARKINSON DISEASE: ICD-10-CM

## 2019-06-19 PROCEDURE — 99999 PR PBB SHADOW E&M-EST. PATIENT-LVL III: CPT | Mod: PBBFAC,,,

## 2019-06-19 PROCEDURE — 11721 DEBRIDE NAIL 6 OR MORE: CPT | Mod: 59,Q9,S$PBB,

## 2019-06-19 PROCEDURE — 99203 OFFICE O/P NEW LOW 30 MIN: CPT | Mod: S$PBB,25,,

## 2019-06-19 PROCEDURE — 11721 DEBRIDE NAIL 6 OR MORE: CPT | Mod: Q9,PBBFAC,PN

## 2019-06-19 PROCEDURE — 99999 PR PBB SHADOW E&M-EST. PATIENT-LVL III: ICD-10-PCS | Mod: PBBFAC,,,

## 2019-06-19 PROCEDURE — 11056 PARNG/CUTG B9 HYPRKR LES 2-4: CPT | Mod: Q9,PBBFAC,PN

## 2019-06-19 PROCEDURE — 99203 PR OFFICE/OUTPT VISIT, NEW, LEVL III, 30-44 MIN: ICD-10-PCS | Mod: S$PBB,25,,

## 2019-06-19 PROCEDURE — 11721 PR DEBRIDEMENT OF NAILS, 6 OR MORE: ICD-10-PCS | Mod: 59,Q9,S$PBB,

## 2019-06-19 PROCEDURE — 99213 OFFICE O/P EST LOW 20 MIN: CPT | Mod: PBBFAC,PN

## 2019-06-19 PROCEDURE — 11056 PR TRIM BENIGN HYPERKERATOTIC SKIN LESION,2-4: ICD-10-PCS | Mod: Q9,S$PBB,,

## 2019-06-19 PROCEDURE — 11056 PARNG/CUTG B9 HYPRKR LES 2-4: CPT | Mod: Q9,S$PBB,,

## 2019-06-19 NOTE — PROGRESS NOTES
Subjective:      Patient ID: Silvano Parmar is a 88 y.o. male.    Chief Complaint: Nail Care (Dr Silvestre Ramirez MD 4-12-19 ) and Foot Swelling (Bilateral feet)    Silvano is a 88 y.o. male who presents to the clinic for evaluation and treatment of high risk feet. Silvano has a past medical history of *Atrial fibrillation, AI (aortic insufficiency), Atrial fibrillation, Diabetes mellitus, Diabetes mellitus, type 2, Hyperlipemia (11/27/2012), Hyperlipidemia, Hypertension, Memory loss, Parkinson disease, and Spinal stenosis (memory loss). The patient's chief complaint is long, thick toenails and swelling in his feet, burning numbing tingling to the toes.  He is seen with his son.    PCP: Silvestre Ramirez MD        Current shoe gear:  Affected Foot: Casual shoes     Unaffected Foot: Casual shoes    Hemoglobin A1C   Date Value Ref Range Status   04/08/2019 5.9 (H) 4.0 - 5.6 % Final     Comment:     ADA Screening Guidelines:  5.7-6.4%  Consistent with prediabetes  >or=6.5%  Consistent with diabetes  High levels of fetal hemoglobin interfere with the HbA1C  assay. Heterozygous hemoglobin variants (HbS, HgC, etc)do  not significantly interfere with this assay.   However, presence of multiple variants may affect accuracy.     11/10/2018 6.0 (H) 4.0 - 5.6 % Final     Comment:     ADA Screening Guidelines:  5.7-6.4%  Consistent with prediabetes  >or=6.5%  Consistent with diabetes  High levels of fetal hemoglobin interfere with the HbA1C  assay. Heterozygous hemoglobin variants (HbS, HgC, etc)do  not significantly interfere with this assay.   However, presence of multiple variants may affect accuracy.         Past Medical History:   Diagnosis Date    *Atrial fibrillation     AI (aortic insufficiency)     mild    Atrial fibrillation     Diabetes mellitus     Diabetes mellitus, type 2     Hyperlipemia 11/27/2012    Hyperlipidemia     Hypertension     Memory loss     Parkinson disease     Spinal stenosis memory loss        Past Surgical History:   Procedure Laterality Date    CHOLECYSTECTOMY         History reviewed. No pertinent family history.    Social History     Socioeconomic History    Marital status:      Spouse name: Not on file    Number of children: Not on file    Years of education: Not on file    Highest education level: Not on file   Occupational History    Not on file   Social Needs    Financial resource strain: Not on file    Food insecurity:     Worry: Not on file     Inability: Not on file    Transportation needs:     Medical: Not on file     Non-medical: Not on file   Tobacco Use    Smoking status: Never Smoker    Smokeless tobacco: Never Used   Substance and Sexual Activity    Alcohol use: No    Drug use: No    Sexual activity: Never   Lifestyle    Physical activity:     Days per week: Not on file     Minutes per session: Not on file    Stress: Not on file   Relationships    Social connections:     Talks on phone: Not on file     Gets together: Not on file     Attends Hoahaoism service: Not on file     Active member of club or organization: Not on file     Attends meetings of clubs or organizations: Not on file     Relationship status: Not on file   Other Topics Concern    Not on file   Social History Narrative    Not on file       Current Outpatient Medications   Medication Sig Dispense Refill    aspirin (ECOTRIN) 81 MG EC tablet Take 1 tablet (81 mg total) by mouth once daily.  0    carbidopa-levodopa  mg (SINEMET)  mg per tablet Take 2 tablets by mouth 3 (three) times daily.       cholecalciferol, vitamin D3, (VITAMIN D3) 4,000 unit Cap Take 1 capsule by mouth once daily.       diltiaZEM (CARDIZEM CD) 120 MG Cp24 TAKE ONE CAPSULE BY MOUTH ONCE DAILY 90 capsule 3    esomeprazole (NEXIUM) 40 MG capsule Take 22.3 mg by mouth before breakfast.       fluticasone (FLONASE) 50 mcg/actuation nasal spray 1 spray (50 mcg total) by Each Nare route once daily. 16 g 11     FOLIC ACID/MULTIVITS-MIN/LUT (CENTRUM SILVER ORAL) Take 1 tablet by mouth once daily.      furosemide (LASIX) 20 MG tablet TAKE 1 TABLET BY MOUTH EVERY DAY 90 tablet 0    gabapentin (NEURONTIN) 300 MG capsule Take 1 capsule (300 mg total) by mouth 2 (two) times daily. 60 capsule 6    GENERLAC 10 gram/15 mL solution Take 10 g by mouth daily as needed.       guaifenesin 100 mg/5 ml (ROBITUSSIN) 100 mg/5 mL syrup Take 200 mg by mouth every 4 (four) hours as needed for Cough.      losartan (COZAAR) 50 MG tablet TAKE 1 TABLET(50 MG) BY MOUTH EVERY DAY 30 tablet 6    meloxicam (MOBIC) 15 MG tablet Take 15 mg by mouth daily as needed for Pain.      mirabegron (MYRBETRIQ) 50 mg Tb24 Take 1 tablet (50 mg total) by mouth once daily. 30 tablet 11    pramipexole (MIRAPEX) 1 MG tablet Take 1 tablet by mouth 3 (three) times daily.      SITagliptan (JANUVIA) 50 MG Tab Take 1 tablet (50 mg total) by mouth once daily. 90 tablet 3    tamsulosin (FLOMAX) 0.4 mg Cap Take 1 capsule (0.4 mg total) by mouth once daily. 90 capsule 3     No current facility-administered medications for this visit.        Review of patient's allergies indicates:   Allergen Reactions    Demerol [meperidine]          ROS  ROS:  Constitution: Negative for chills, fever, weakness and malaise/fatigue.   HEENT: Negative for headaches.   Cardiovascular: Negative for chest pain and claudication.   Respiratory: Negative for cough and shortness of breath.   Musculoskeletal: (+) for foot pain.  Negative for muscle cramps and muscle weakness.   Gastrointestinal: Negative for nausea and vomiting.   Neurological:(+) for numbness, tingling and paresthesias.   Dermatological:  - for skin rash, (+) for keratosis, (+) for fungal toenails, (--) for wound.          Objective:      Physical Exam  Constitutional:  Patient is oriented to person, place, and time. Vital signs are normal.  Appears well-developed and well-nourished.     Vascular:  Dorsalis pedis pulses are  1/4 on the right side, and 1/4 on the left side.   Posterior tibial pulses are 1/4 on the right side, and 1/4 on the left side.   (--) digital hair growth, capillary fill time to all toes <3 seconds, toes are warm touch, b/l pitting edema    Skin/Dermatological:  Skin is warm and intact.  No cyanosis or clubbing.  No rashes noted.  No open wounds.  Right 1, 2, 3, 4, 5 and left 1, 2, 3, 4, 5  toenails yellow discolored, thickened 2-4 mm to base with subungual debris.  (+) keratosis b/l great toes    Musculoskeletal:      Hallux abducto valgus bilaterally, hammertoes 2-5 observed.  Pedal rom within normal limits.  (--) ankle joint DF restriction with both knee flexed and extened.    Neurological:  (+) deficits to sharp/dull, light touch or vibratory sensation bilateral feet, ten points tested.   Muscle strength to tibialis anterior, extensor hallucis longus, extensor digitorum longus, peroneal muscles, flexor hallucis/digotorum longus, posterior tibial and gastrosoleal complex is 5/5, normal tone without assymmetry   Patellar reflexes are 2+ on the right side and 2+ on the left side.  Achilles reflexes are 2+ on the right side and 2+ on the left side.          Assessment:       Encounter Diagnoses   Name Primary?    Diabetic peripheral neuropathy associated with type 2 diabetes mellitus Yes    Corn or callus     Onychomycosis due to dermatophyte     Venous insufficiency     Debility     TIA (transient ischemic attack)     Parkinson disease     Valgus deformity of both great toes     Hammer toes of both feet          Plan:       Silvano was seen today for nail care and foot swelling.    Diagnoses and all orders for this visit:    Diabetic peripheral neuropathy associated with type 2 diabetes mellitus  -     DIABETIC SHOES FOR HOME USE    Corn or callus  -     DIABETIC SHOES FOR HOME USE    Onychomycosis due to dermatophyte  -     DIABETIC SHOES FOR HOME USE    Venous insufficiency  -     DIABETIC SHOES FOR HOME  USE    Debility  -     DIABETIC SHOES FOR HOME USE    TIA (transient ischemic attack)  -     DIABETIC SHOES FOR HOME USE    Parkinson disease  -     DIABETIC SHOES FOR HOME USE    Valgus deformity of both great toes  -     DIABETIC SHOES FOR HOME USE    Hammer toes of both feet  -     DIABETIC SHOES FOR HOME USE      I counseled the patient on his conditions, their implications and medical management.    Shoe inspection. Diabetic Foot Education. Patient reminded of the importance of good nutrition and blood sugar control to help prevent podiatric complications of diabetes. Patient instructed on proper foot hygeine. We discussed wearing proper shoe gear, daily foot inspections, never walking without protective shoe gear, never putting sharp instruments to feet.  We also discussed padding and shoes with high toe boxes for foot deformities.    Patient declines compression stockings    - With patient's permission, right 1, 2, 3, 4, 5 and left 1, 2, 3, 4, 5 toenails were aggressively reduced and debrided  to their soft tissue attachment mechanically with nail nipper, removing all offending nail and debris. The porokeratotic tissue was pared x 2 with a 15 blade.  Patient relates relief following the procedure. Patient will continue to monitor the areas daily, inspect feet, wear protective shoe gear when ambulatory, moisturizer to maintain skin integrity and follow in this office in approximately 2-3 months, sooner pmichele.        Grey Marte DPM

## 2019-08-19 ENCOUNTER — PATIENT MESSAGE (OUTPATIENT)
Dept: PODIATRY | Facility: CLINIC | Age: 84
End: 2019-08-19

## 2019-08-29 ENCOUNTER — OFFICE VISIT (OUTPATIENT)
Dept: UROLOGY | Facility: CLINIC | Age: 84
End: 2019-08-29
Attending: UROLOGY
Payer: MEDICARE

## 2019-08-29 VITALS
SYSTOLIC BLOOD PRESSURE: 138 MMHG | WEIGHT: 214.06 LBS | HEART RATE: 63 BPM | HEIGHT: 72 IN | DIASTOLIC BLOOD PRESSURE: 75 MMHG | BODY MASS INDEX: 28.99 KG/M2

## 2019-08-29 DIAGNOSIS — N40.1 BENIGN NON-NODULAR PROSTATIC HYPERPLASIA WITH LOWER URINARY TRACT SYMPTOMS: Primary | ICD-10-CM

## 2019-08-29 DIAGNOSIS — N32.81 OAB (OVERACTIVE BLADDER): ICD-10-CM

## 2019-08-29 LAB
BILIRUB SERPL-MCNC: ABNORMAL MG/DL
BLOOD URINE, POC: ABNORMAL
COLOR, POC UA: YELLOW
GLUCOSE UR QL STRIP: ABNORMAL
KETONES UR QL STRIP: ABNORMAL
LEUKOCYTE ESTERASE URINE, POC: ABNORMAL
NITRITE, POC UA: ABNORMAL
PH, POC UA: 8
POC RESIDUAL URINE VOLUME: 29 ML (ref 0–100)
PROTEIN, POC: ABNORMAL
SPECIFIC GRAVITY, POC UA: 1
UROBILINOGEN, POC UA: ABNORMAL

## 2019-08-29 PROCEDURE — 81002 URINALYSIS NONAUTO W/O SCOPE: CPT | Mod: S$GLB,,, | Performed by: UROLOGY

## 2019-08-29 PROCEDURE — 51798 US URINE CAPACITY MEASURE: CPT | Mod: S$GLB,,, | Performed by: UROLOGY

## 2019-08-29 PROCEDURE — 99213 OFFICE O/P EST LOW 20 MIN: CPT | Mod: 25,S$GLB,, | Performed by: UROLOGY

## 2019-08-29 PROCEDURE — 99213 PR OFFICE/OUTPT VISIT, EST, LEVL III, 20-29 MIN: ICD-10-PCS | Mod: 25,S$GLB,, | Performed by: UROLOGY

## 2019-08-29 PROCEDURE — 81002 POCT URINE DIPSTICK WITHOUT MICROSCOPE: ICD-10-PCS | Mod: S$GLB,,, | Performed by: UROLOGY

## 2019-08-29 PROCEDURE — 51798 POCT BLADDER SCAN: ICD-10-PCS | Mod: S$GLB,,, | Performed by: UROLOGY

## 2019-08-29 NOTE — PROGRESS NOTES
Subjective:      Silvano Parmar is a 88 y.o. male who returns today regarding his BPH.    He previously saw Dr. Santo.     PMHx significant for Parkinson's disease.     He is on flomax for BPH.     He started myrbetriq 3 months ago due to incontinence. He still wears a pad daily, which is less wet than it was previously. He no longer has accidents after strong urge if he can't quickly get to the bathroom during the daytime, though this does still occur some at night.    The following portions of the patient's history were reviewed and updated as appropriate: allergies, current medications, past family history, past medical history, past social history, past surgical history and problem list.    Review of Systems  A comprehensive multipoint review of systems was negative except as otherwise stated in the HPI.     Objective:   Vitals: /75 (BP Location: Left arm, Patient Position: Sitting, BP Method: Large (Automatic))   Pulse 63   Ht 6' (1.829 m)   Wt 97.1 kg (214 lb 1.1 oz)   BMI 29.03 kg/m²     Physical Exam   General: alert and oriented, no acute distress  Respiratory: Symmetric expansion, non-labored breathing  Neuro: no gross deficits  Psych: normal judgment and insight, normal mood/affect and non-anxious    Bladder Scan PVR: 29cc      Lab Review   Urinalysis demonstrates negative for all components  Lab Results   Component Value Date    WBC 6.36 04/11/2019    HGB 13.7 (L) 04/11/2019    HCT 40.9 04/11/2019    MCV 96 04/11/2019     (L) 04/11/2019     Lab Results   Component Value Date    CREATININE 1.0 04/11/2019    BUN 19 04/11/2019       Assessment and Plan:   1. Benign non-nodular prostatic hyperplasia with lower urinary tract symptoms  -- Continue flomax    2. OAB (overactive bladder)  -- Improved some w/ myrbetriq - will continue    FU 6 mos

## 2019-10-10 ENCOUNTER — OFFICE VISIT (OUTPATIENT)
Dept: PODIATRY | Facility: CLINIC | Age: 84
End: 2019-10-10
Payer: MEDICARE

## 2019-10-10 VITALS — BODY MASS INDEX: 28.99 KG/M2 | HEIGHT: 72 IN | WEIGHT: 214 LBS

## 2019-10-10 DIAGNOSIS — E11.42 DIABETIC PERIPHERAL NEUROPATHY ASSOCIATED WITH TYPE 2 DIABETES MELLITUS: Primary | ICD-10-CM

## 2019-10-10 DIAGNOSIS — B35.1 ONYCHOMYCOSIS DUE TO DERMATOPHYTE: ICD-10-CM

## 2019-10-10 PROCEDURE — 99499 UNLISTED E&M SERVICE: CPT | Mod: S$PBB,,, | Performed by: PODIATRIST

## 2019-10-10 PROCEDURE — 99999 PR PBB SHADOW E&M-EST. PATIENT-LVL III: CPT | Mod: PBBFAC,,, | Performed by: PODIATRIST

## 2019-10-10 PROCEDURE — 11721 DEBRIDE NAIL 6 OR MORE: CPT | Mod: Q9,PBBFAC,PN | Performed by: PODIATRIST

## 2019-10-10 PROCEDURE — 99999 PR PBB SHADOW E&M-EST. PATIENT-LVL III: ICD-10-PCS | Mod: PBBFAC,,, | Performed by: PODIATRIST

## 2019-10-10 PROCEDURE — 11721 ROUTINE FOOT CARE: ICD-10-PCS | Mod: Q9,S$PBB,, | Performed by: PODIATRIST

## 2019-10-10 PROCEDURE — 99213 OFFICE O/P EST LOW 20 MIN: CPT | Mod: PBBFAC,PN | Performed by: PODIATRIST

## 2019-10-10 PROCEDURE — 99499 NO LOS: ICD-10-PCS | Mod: S$PBB,,, | Performed by: PODIATRIST

## 2019-10-10 NOTE — PATIENT INSTRUCTIONS
How to Check Your Feet    Below are tips to help you look for foot problems. Try to check your feet at the same time each day, such as when you get out of bed in the morning.    · Check the top of each foot. The tops of toes, back of the heel, and outer edge of the foot can get a lot of rubbing from poor-fitting shoes.    · Check the bottom of each foot. Daily wear and tear often leads to problems at pressure spots.    · Check the toes and nails. Fungal infections often occur between toes. Toenail problems can also be a sign of fungal infections or lead to breaks in the skin.    · Check your shoes, too. Loose objects inside a shoe can injure the foot. Use your hand to feel inside your shoes for things like yvonne, loose stitching, or rough areas that could irritate your skin.        Diabetic Foot Care    Diabetes can lead to a number of different foot complications. Fortunately, most of these complications can be prevented with a little extra foot care. If diabetes is not well controlled, the high blood sugar can cause damage to blood vessels and result in poor circulation to the foot. When the skin does not get enough blood flow, it becomes prone to pressure sores and ulcers, which heal slowly.  High blood sugar can also damage nerves, interfering with the ability to feel pain and pressure. When you cant feel your foot normally, it is easy to injure your skin, bones and joints without knowing it. For these reasons diabetes increases the risk of fungal infections, bunions and ulcers. Deep ulcers can lead to bone infection. Gangrene is the most serious foot complication of diabetes. It usually occurs on the tips of the toes as blacked areas of skin. The black area is dead tissue. In severe cases, gangrene spreads to involve the entire toe, other toes and the entire foot. Foot or toe amputation may be required. Good foot care and blood sugar control can prevent this.    Home Care  1. Wear comfortable, proper fitting  shoes.  2. Wash your feet daily with warm water and mild soap.  3. After drying, apply a moisturizing cream or lotion.  4. Check your feet daily for skin breaks, blisters, swelling, or redness. Look between your toes also.  5. Wear cotton socks and change them every day.  6. Trim toe nails carefully and do not cut your cuticles.  7. Strive to keep your blood sugar under control with a combination of medicines, diet and activity.  8. If you smoke and have diabetes, it is very important that you stop. Smoking reduces blood flow to your foot.  9. Avoid activities that increase your risk of foot injury:  · Do not walk barefoot.  · Do not use heating pads or hot water bottles on your feet.  · Do not put your foot in a hot tub without first checking the temperature with your hand.  10) Schedule yearly foot exams.    Follow Up  with your doctor or as advised by our staff. Report any cut, puncture, scrape, other injury, blister, ingrown toenail or ulcer on your foot.    Get Prompt Medical Attention  if any of the following occur:  -- Open ulcer with pus draining from the wound  -- Increasing foot or leg pain  -- New areas of redness or swelling or tender areas of the foot    © 4357-2653 [employer] [address] [place] [address], [place] [address]. All rights reserved. This information is not intended as a substitute for professional medical care. Always follow your healthcare professional's instructions.

## 2019-10-10 NOTE — PROGRESS NOTES
Subjective:      Patient ID: Silvano Parmar is a 89 y.o. male.    Chief Complaint: Diabetes Mellitus (PCP: Silvestre Ramirez 05/27/2019  A1C: 04/08/2019 / 5.9) and Diabetic Foot Exam    Silvano is a 89 y.o. male who presents to the clinic for evaluation and treatment of high risk feet. Silvano has a past medical history of *Atrial fibrillation, AI (aortic insufficiency), Atrial fibrillation, Diabetes mellitus, Diabetes mellitus, type 2, Hyperlipemia (11/27/2012), Hyperlipidemia, Hypertension, Memory loss, Parkinson disease, and Spinal stenosis (memory loss).     The patient's chief complaint is long, thick toenails     PCP: Silvestre Ramirez MD    Diabetes Mellitus (PCP: Silvetsre Ramirez 05/27/2019  A1C: 04/08/2019 / 5.9) and Diabetic Foot Exam        Current shoe gear:  Affected Foot: Casual shoes     Unaffected Foot: Casual shoes    Hemoglobin A1C   Date Value Ref Range Status   04/08/2019 5.9 (H) 4.0 - 5.6 % Final     Comment:     ADA Screening Guidelines:  5.7-6.4%  Consistent with prediabetes  >or=6.5%  Consistent with diabetes  High levels of fetal hemoglobin interfere with the HbA1C  assay. Heterozygous hemoglobin variants (HbS, HgC, etc)do  not significantly interfere with this assay.   However, presence of multiple variants may affect accuracy.     11/10/2018 6.0 (H) 4.0 - 5.6 % Final     Comment:     ADA Screening Guidelines:  5.7-6.4%  Consistent with prediabetes  >or=6.5%  Consistent with diabetes  High levels of fetal hemoglobin interfere with the HbA1C  assay. Heterozygous hemoglobin variants (HbS, HgC, etc)do  not significantly interfere with this assay.   However, presence of multiple variants may affect accuracy.         Past Medical History:   Diagnosis Date    *Atrial fibrillation     AI (aortic insufficiency)     mild    Atrial fibrillation     Diabetes mellitus     Diabetes mellitus, type 2     Hyperlipemia 11/27/2012    Hyperlipidemia     Hypertension     Memory loss     Parkinson disease      Spinal stenosis memory loss       Past Surgical History:   Procedure Laterality Date    CHOLECYSTECTOMY         History reviewed. No pertinent family history.    Social History     Socioeconomic History    Marital status:      Spouse name: Not on file    Number of children: Not on file    Years of education: Not on file    Highest education level: Not on file   Occupational History    Not on file   Social Needs    Financial resource strain: Not on file    Food insecurity:     Worry: Not on file     Inability: Not on file    Transportation needs:     Medical: Not on file     Non-medical: Not on file   Tobacco Use    Smoking status: Never Smoker    Smokeless tobacco: Never Used   Substance and Sexual Activity    Alcohol use: No    Drug use: No    Sexual activity: Never   Lifestyle    Physical activity:     Days per week: Not on file     Minutes per session: Not on file    Stress: Not on file   Relationships    Social connections:     Talks on phone: Not on file     Gets together: Not on file     Attends [employer] service: Not on file     Active member of club or organization: Not on file     Attends meetings of clubs or organizations: Not on file     Relationship status: Not on file   Other Topics Concern    Not on file   Social History Narrative    Not on file       Current Outpatient Medications   Medication Sig Dispense Refill    aspirin (ECOTRIN) 81 MG EC tablet Take 1 tablet (81 mg total) by mouth once daily.  0    carbidopa-levodopa  mg (SINEMET)  mg per tablet Take 2 tablets by mouth 3 (three) times daily.       cholecalciferol, vitamin D3, (VITAMIN D3) 4,000 unit Cap Take 1 capsule by mouth once daily.       cholecalciferol, vitamin D3, 2,000 unit/drop Drop Take 1 tablet by mouth.      diltiaZEM (CARDIZEM CD) 120 MG Cp24 TAKE ONE CAPSULE BY MOUTH ONCE DAILY 90 capsule 3    esomeprazole (NEXIUM) 40 MG capsule Take 22.3 mg by mouth before breakfast.        fluticasone (FLONASE) 50 mcg/actuation nasal spray 1 spray (50 mcg total) by Each Nare route once daily. 16 g 11    FOLIC ACID/MULTIVITS-MIN/LUT (CENTRUM SILVER ORAL) Take 1 tablet by mouth once daily.      furosemide (LASIX) 20 MG tablet TAKE 1 TABLET BY MOUTH EVERY DAY 90 tablet 0    gabapentin (NEURONTIN) 300 MG capsule TAKE ONE CAPSULE BY MOUTH TWICE DAILY 60 capsule 6    GENERLAC 10 gram/15 mL solution Take 10 g by mouth daily as needed.       guaifenesin 100 mg/5 ml (ROBITUSSIN) 100 mg/5 mL syrup Take 200 mg by mouth every 4 (four) hours as needed for Cough.      JANUVIA 50 mg Tab TAKE ONE TABLET BY MOUTH ONCE DAILY 90 tablet 3    losartan (COZAAR) 50 MG tablet TAKE ONE TABLET BY MOUTH ONCE DAILY 30 tablet 6    meloxicam (MOBIC) 15 MG tablet Take 15 mg by mouth daily as needed for Pain.      mirabegron (MYRBETRIQ) 50 mg Tb24 Take 1 tablet (50 mg total) by mouth once daily. 30 tablet 11    pramipexole (MIRAPEX) 1 MG tablet Take 1 tablet by mouth 3 (three) times daily.      tamsulosin (FLOMAX) 0.4 mg Cap Take 1 capsule (0.4 mg total) by mouth once daily. 90 capsule 3     No current facility-administered medications for this visit.        Review of patient's allergies indicates:   Allergen Reactions    Demerol [meperidine]            ROS:  Constitution: Negative for chills, fever, weakness and malaise/fatigue.   HEENT: Negative for headaches.   Cardiovascular: Negative for chest pain and claudication.   Respiratory: Negative for cough and shortness of breath.   Musculoskeletal: (+) for foot pain.  Negative for muscle cramps and muscle weakness.   Gastrointestinal: Negative for nausea and vomiting.   Neurological:(+) for numbness, tingling and paresthesias.   Dermatological:  - for skin rash, (+) for keratosis, (+) for fungal toenails, (--) for wound.          Objective:      Constitutional:  Patient is oriented to person, place, and time. Vital signs are normal.  Appears well-developed and  well-nourished.     Vascular:  Dorsalis pedis pulses are 1/4 on the right side, and 1/4 on the left side.   Posterior tibial pulses are 1/4 on the right side, and 1/4 on the left side.   (--) digital hair growth, capillary fill time to all toes <3 seconds, toes are warm touch, b/l pitting edema    Skin/Dermatological:  Skin is warm and intact.  No cyanosis or clubbing.  No rashes noted.  No open wounds.  Right 1, 2, 3, 4, 5 and left 1, 2, 3, 4, 5  toenails yellow discolored, thickened 2-4 mm to base with subungual debris.  (+) keratosis b/l great toes    Musculoskeletal:      Hallux abducto valgus bilaterally, hammertoes 2-5 observed.  Pedal rom within normal limits.  (--) ankle joint DF restriction with both knee flexed and extened.    Neurological:  (+) deficits to sharp/dull, light touch or vibratory sensation bilateral feet, ten points tested.   Muscle strength to tibialis anterior, extensor hallucis longus, extensor digitorum longus, peroneal muscles, flexor hallucis/digotorum longus, posterior tibial and gastrosoleal complex is 5/5, normal tone without assymmetry   Patellar reflexes are 2+ on the right side and 2+ on the left side.  Achilles reflexes are 2+ on the right side and 2+ on the left side.          Assessment:       Encounter Diagnoses   Name Primary?    Diabetic peripheral neuropathy associated with type 2 diabetes mellitus Yes    Onychomycosis due to dermatophyte          Plan:       Silvano was seen today for diabetes mellitus and diabetic foot exam.    Diagnoses and all orders for this visit:    Diabetic peripheral neuropathy associated with type 2 diabetes mellitus    Onychomycosis due to dermatophyte      I counseled the patient on his conditions, their implications and medical management.    Shoe inspection. Diabetic Foot Education. Patient reminded of the importance of good nutrition and blood sugar control to help prevent podiatric complications of diabetes. Patient instructed on proper foot  hygeine. We discussed wearing proper shoe gear, daily foot inspections, never walking without protective shoe gear, never putting sharp instruments to feet.  We also discussed padding and shoes with high toe boxes for foot deformities.    Patient declines compression stockings      Routine Foot Care  Date/Time: 10/10/2019 1:45 PM  Performed by: Mary Ann Gaona DPM  Authorized by: Mary Ann Gaona DPM     Consent Done?:  Yes (Verbal)    Nail Care Type:  Debride  Location(s): All  (Left 1st Toe, Left 3rd Toe, Left 2nd Toe, Left 4th Toe, Left 5th Toe, Right 1st Toe, Right 2nd Toe, Right 3rd Toe, Right 4th Toe and Right 5th Toe)  Patient tolerance:  Patient tolerated the procedure well with no immediate complications     With patient's permission, the toenails mentioned above were aggressively reduced and debrided using a nail nipper, removing all offending nail and debris. The patient will continue to monitor the areas daily, inspect the feet, wear protective shoe gear when ambulatory, and moisturizer to maintain skin integrity.               Mary Ann Gaona DPM

## 2019-11-10 ENCOUNTER — HOSPITAL ENCOUNTER (EMERGENCY)
Facility: OTHER | Age: 84
Discharge: HOME | End: 2019-11-11
Attending: EMERGENCY MEDICINE
Payer: MEDICARE
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
## 2019-11-10 DIAGNOSIS — M54.50 ACUTE LEFT-SIDED LOW BACK PAIN WITHOUT SCIATICA: ICD-10-CM

## 2019-11-10 DIAGNOSIS — M25.552 LEFT HIP PAIN: ICD-10-CM

## 2019-11-10 PROCEDURE — 99284 EMERGENCY DEPT VISIT MOD MDM: CPT

## 2019-11-10 RX ORDER — IBUPROFEN 600 MG/1
600 TABLET ORAL
Status: COMPLETED | OUTPATIENT
Start: 2019-11-11 | End: 2019-11-10

## 2019-11-10 RX ORDER — GUAIFENESIN 1200 MG
TABLET, EXTENDED RELEASE 12 HR ORAL
COMMUNITY
End: 2020-03-25 | Stop reason: HOSPADM

## 2019-11-10 RX ADMIN — IBUPROFEN 600 MG: 600 TABLET, FILM COATED ORAL at 11:11

## 2019-11-11 VITALS
SYSTOLIC BLOOD PRESSURE: 157 MMHG | OXYGEN SATURATION: 96 % | RESPIRATION RATE: 18 BRPM | HEART RATE: 82 BPM | DIASTOLIC BLOOD PRESSURE: 112 MMHG | TEMPERATURE: 97 F

## 2019-11-11 PROCEDURE — 25000003 PHARM REV CODE 250: Performed by: EMERGENCY MEDICINE

## 2019-11-11 NOTE — ED PROVIDER NOTES
"Encounter Date: 11/10/2019    SCRIBE #1 NOTE: I, Kindra Parker, am scribing for, and in the presence of, Dr. French.       History     Chief Complaint   Patient presents with    Fall     Pt states that he slid out of bed, no trauma noted only pain to back with movement, Hx parkinsons, memory loss     Time seen by provider: 11:35 PM    This is a 89 y.o. male arriving via EMS who presents with complaint of left lower back pain and left lower abdominal pain s/p a slip and fall PTA. The patient reports his sheets are very "slick" and as he was leaning over to urinate he slid onto the floor and was unable to stand up. He denies hitting his head. His son reports medical history of back problems. He denies being on home O2.    The history is provided by the patient and a relative (Son.).     Review of patient's allergies indicates:   Allergen Reactions    Demerol [meperidine]      Past Medical History:   Diagnosis Date    *Atrial fibrillation     AI (aortic insufficiency)     mild    Atrial fibrillation     Diabetes mellitus     Diabetes mellitus, type 2     Hyperlipemia 11/27/2012    Hyperlipidemia     Hypertension     Memory loss     Parkinson disease     Spinal stenosis memory loss     Past Surgical History:   Procedure Laterality Date    CHOLECYSTECTOMY       No family history on file.  Social History     Tobacco Use    Smoking status: Never Smoker    Smokeless tobacco: Never Used   Substance Use Topics    Alcohol use: No    Drug use: No     Review of Systems   Constitutional: Negative for fever.   HENT: Negative for sore throat.    Respiratory: Negative for shortness of breath.    Cardiovascular: Negative for chest pain.   Gastrointestinal: Positive for abdominal pain.   Genitourinary: Negative for dysuria.   Musculoskeletal: Positive for back pain.   Skin: Negative for rash.   Neurological: Negative for weakness.   Hematological: Does not bruise/bleed easily.   All other systems reviewed and are " negative.      Physical Exam     Initial Vitals [11/10/19 2327]   BP Pulse Resp Temp SpO2   (!) 160/78 70 18 98 °F (36.7 °C) 96 %      MAP       --         Physical Exam    Nursing note and vitals reviewed.  Constitutional: He appears well-developed and well-nourished. He is not diaphoretic. No distress.   HENT:   Head: Normocephalic and atraumatic.   Eyes: Conjunctivae and EOM are normal.   Neck: Normal range of motion.   Cardiovascular: Normal rate, regular rhythm and normal heart sounds.   Pulmonary/Chest: Breath sounds normal. No respiratory distress. He has no wheezes. He has no rhonchi. He has no rales.   Abdominal: There is no tenderness.   Musculoskeletal:   2 + pitting edema to bilateral lower extremities. Pelvis stable. Internal and external rotation of left hip normal. Unable to bend right knee secondary to back pain. No midline thoracic or lumbar tenderness to palpation or step offs. No paraspinal tenderness.   Neurological: He is alert. GCS score is 15. GCS eye subscore is 4. GCS verbal subscore is 5. GCS motor subscore is 6.   Skin: Skin is warm and dry.         ED Course   Procedures  Labs Reviewed - No data to display       Imaging Results    None          Medical Decision Making:   History:   Old Medical Records: I decided to obtain old medical records.  Independently Interpreted Test(s):   I have ordered and independently interpreted X-rays - see prior notes.  Clinical Tests:   Radiological Study: Ordered and Reviewed    Additional MDM:   Comments: 89-year-old male brought in by EMS for evaluation after reportedly slipping out of the bed and landing on the floor.  Patient reports left-sided hip and low back pain. He denies any other complaints.  On exam he had tenderness to palpation over the left hip but had full range of motion of the hip with no growing pain. X-rays of the hip and low back were significant only for chronic low back changes.  Patient's son states that this is a known problem.   Patient was able to ambulate in the emergency department with a walker which is his baseline.  He will be discharged at this time in stable condition..          Scribe Attestation:   Scribe #1: I performed the above scribed service and the documentation accurately describes the services I performed. I attest to the accuracy of the note.    Attending Attestation:           Physician Attestation for Scribe:  Physician Attestation Statement for Scribe #1: I, Dr. French, reviewed documentation, as scribed by Kindra Parker in my presence, and it is both accurate and complete.                               Clinical Impression:   No diagnosis found.                            Irene French MD  11/11/19 0112

## 2019-11-11 NOTE — ED NOTES
Patient rounding complete. Pt reports pain 0/10. Restroom and comfort needs addressed. Pt updated on POC. Call light in reach. Will continue to monitor. Pt awaiting transport.

## 2019-12-15 ENCOUNTER — HOSPITAL ENCOUNTER (INPATIENT)
Facility: OTHER | Age: 84
LOS: 3 days | DRG: 481 | End: 2019-12-18
Attending: EMERGENCY MEDICINE | Admitting: EMERGENCY MEDICINE
Payer: MEDICARE

## 2019-12-15 DIAGNOSIS — N17.9 AKI (ACUTE KIDNEY INJURY): ICD-10-CM

## 2019-12-15 DIAGNOSIS — T14.90XA INJURY: ICD-10-CM
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
## 2019-12-15 DIAGNOSIS — I95.9 HYPOTENSION: ICD-10-CM

## 2019-12-15 DIAGNOSIS — S72.142A CLOSED DISPLACED INTERTROCHANTERIC FRACTURE OF LEFT FEMUR, INITIAL ENCOUNTER: Primary | ICD-10-CM

## 2019-12-15 DIAGNOSIS — E86.0 DEHYDRATION: ICD-10-CM

## 2019-12-15 PROBLEM — N28.9 ACUTE ON CHRONIC RENAL INSUFFICIENCY: Status: ACTIVE | Noted: 2019-12-15

## 2019-12-15 PROBLEM — N18.9 ACUTE ON CHRONIC RENAL INSUFFICIENCY: Status: ACTIVE | Noted: 2019-12-15

## 2019-12-15 LAB
ABO + RH BLD: NORMAL
ALBUMIN SERPL BCP-MCNC: 3.4 G/DL (ref 3.5–5.2)
ALP SERPL-CCNC: 90 U/L (ref 55–135)
ALT SERPL W/O P-5'-P-CCNC: 7 U/L (ref 10–44)
ANION GAP SERPL CALC-SCNC: 13 MMOL/L (ref 8–16)
APTT BLDCRRT: 28.8 SEC (ref 21–32)
AST SERPL-CCNC: 25 U/L (ref 10–40)
BASOPHILS # BLD AUTO: 0.04 K/UL (ref 0–0.2)
BASOPHILS NFR BLD: 0.3 % (ref 0–1.9)
BILIRUB SERPL-MCNC: 2 MG/DL (ref 0.1–1)
BLD GP AB SCN CELLS X3 SERPL QL: NORMAL
BUN SERPL-MCNC: 41 MG/DL (ref 8–23)
CALCIUM SERPL-MCNC: 9.2 MG/DL (ref 8.7–10.5)
CHLORIDE SERPL-SCNC: 101 MMOL/L (ref 95–110)
CO2 SERPL-SCNC: 23 MMOL/L (ref 23–29)
CREAT SERPL-MCNC: 2.9 MG/DL (ref 0.5–1.4)
CREAT UR-MCNC: 230.8 MG/DL (ref 23–375)
DIFFERENTIAL METHOD: ABNORMAL
EOSINOPHIL # BLD AUTO: 0.1 K/UL (ref 0–0.5)
EOSINOPHIL NFR BLD: 0.6 % (ref 0–8)
ERYTHROCYTE [DISTWIDTH] IN BLOOD BY AUTOMATED COUNT: 14.3 % (ref 11.5–14.5)
EST. GFR  (AFRICAN AMERICAN): 21 ML/MIN/1.73 M^2
EST. GFR  (NON AFRICAN AMERICAN): 18 ML/MIN/1.73 M^2
GLUCOSE SERPL-MCNC: 140 MG/DL (ref 70–110)
HCT VFR BLD AUTO: 38.5 % (ref 40–54)
HGB BLD-MCNC: 12.8 G/DL (ref 14–18)
IMM GRANULOCYTES # BLD AUTO: 0.11 K/UL (ref 0–0.04)
IMM GRANULOCYTES NFR BLD AUTO: 0.9 % (ref 0–0.5)
INR PPP: 1 (ref 0.8–1.2)
LYMPHOCYTES # BLD AUTO: 0.8 K/UL (ref 1–4.8)
LYMPHOCYTES NFR BLD: 6.5 % (ref 18–48)
MCH RBC QN AUTO: 32.7 PG (ref 27–31)
MCHC RBC AUTO-ENTMCNC: 33.2 G/DL (ref 32–36)
MCV RBC AUTO: 99 FL (ref 82–98)
MONOCYTES # BLD AUTO: 1.4 K/UL (ref 0.3–1)
MONOCYTES NFR BLD: 12.2 % (ref 4–15)
NEUTROPHILS # BLD AUTO: 9.3 K/UL (ref 1.8–7.7)
NEUTROPHILS NFR BLD: 79.5 % (ref 38–73)
NRBC BLD-RTO: 0 /100 WBC
OSMOLALITY UR: 466 MOSM/KG (ref 50–1200)
PLATELET # BLD AUTO: 119 K/UL (ref 150–350)
PMV BLD AUTO: 10.7 FL (ref 9.2–12.9)
POCT GLUCOSE: 96 MG/DL (ref 70–110)
POTASSIUM SERPL-SCNC: 3.9 MMOL/L (ref 3.5–5.1)
PROT SERPL-MCNC: 6.5 G/DL (ref 6–8.4)
PROT UR-MCNC: 66 MG/DL (ref 0–15)
PROT/CREAT UR: 0.29 MG/G{CREAT} (ref 0–0.2)
PROTHROMBIN TIME: 11.4 SEC (ref 9–12.5)
RBC # BLD AUTO: 3.91 M/UL (ref 4.6–6.2)
SODIUM SERPL-SCNC: 137 MMOL/L (ref 136–145)
SODIUM UR-SCNC: <20 MMOL/L (ref 20–250)
WBC # BLD AUTO: 11.68 K/UL (ref 3.9–12.7)

## 2019-12-15 PROCEDURE — 25000003 PHARM REV CODE 250: Performed by: PHYSICIAN ASSISTANT

## 2019-12-15 PROCEDURE — 11000001 HC ACUTE MED/SURG PRIVATE ROOM

## 2019-12-15 PROCEDURE — 96360 HYDRATION IV INFUSION INIT: CPT

## 2019-12-15 PROCEDURE — 84156 ASSAY OF PROTEIN URINE: CPT

## 2019-12-15 PROCEDURE — 93010 ELECTROCARDIOGRAM REPORT: CPT | Mod: ,,, | Performed by: INTERNAL MEDICINE

## 2019-12-15 PROCEDURE — 83935 ASSAY OF URINE OSMOLALITY: CPT

## 2019-12-15 PROCEDURE — 86901 BLOOD TYPING SEROLOGIC RH(D): CPT

## 2019-12-15 PROCEDURE — 99285 EMERGENCY DEPT VISIT HI MDM: CPT | Mod: 25

## 2019-12-15 PROCEDURE — 85730 THROMBOPLASTIN TIME PARTIAL: CPT

## 2019-12-15 PROCEDURE — 93005 ELECTROCARDIOGRAM TRACING: CPT

## 2019-12-15 PROCEDURE — 80053 COMPREHEN METABOLIC PANEL: CPT

## 2019-12-15 PROCEDURE — 85610 PROTHROMBIN TIME: CPT

## 2019-12-15 PROCEDURE — 85025 COMPLETE CBC W/AUTO DIFF WBC: CPT

## 2019-12-15 PROCEDURE — 63600175 PHARM REV CODE 636 W HCPCS: Performed by: EMERGENCY MEDICINE

## 2019-12-15 PROCEDURE — 96361 HYDRATE IV INFUSION ADD-ON: CPT

## 2019-12-15 PROCEDURE — 93010 EKG 12-LEAD: ICD-10-PCS | Mod: ,,, | Performed by: INTERNAL MEDICINE

## 2019-12-15 PROCEDURE — 84300 ASSAY OF URINE SODIUM: CPT

## 2019-12-15 RX ORDER — ASPIRIN 81 MG/1
81 TABLET ORAL DAILY
Status: DISCONTINUED | OUTPATIENT
Start: 2019-12-16 | End: 2019-12-17

## 2019-12-15 RX ORDER — IBUPROFEN 200 MG
16 TABLET ORAL
Status: DISCONTINUED | OUTPATIENT
Start: 2019-12-15 | End: 2019-12-18 | Stop reason: HOSPADM

## 2019-12-15 RX ORDER — DILTIAZEM HYDROCHLORIDE 120 MG/1
120 CAPSULE, COATED, EXTENDED RELEASE ORAL DAILY
Status: DISCONTINUED | OUTPATIENT
Start: 2019-12-16 | End: 2019-12-18 | Stop reason: HOSPADM

## 2019-12-15 RX ORDER — GLUCAGON 1 MG
1 KIT INJECTION
Status: DISCONTINUED | OUTPATIENT
Start: 2019-12-15 | End: 2019-12-18 | Stop reason: HOSPADM

## 2019-12-15 RX ORDER — PRAMIPEXOLE DIHYDROCHLORIDE 0.5 MG/1
1 TABLET ORAL 3 TIMES DAILY
Status: DISCONTINUED | OUTPATIENT
Start: 2019-12-16 | End: 2019-12-18 | Stop reason: HOSPADM

## 2019-12-15 RX ORDER — CARBIDOPA AND LEVODOPA 25; 100 MG/1; MG/1
2 TABLET ORAL 3 TIMES DAILY
Status: DISCONTINUED | OUTPATIENT
Start: 2019-12-16 | End: 2019-12-18 | Stop reason: HOSPADM

## 2019-12-15 RX ORDER — SODIUM CHLORIDE, SODIUM LACTATE, POTASSIUM CHLORIDE, CALCIUM CHLORIDE 600; 310; 30; 20 MG/100ML; MG/100ML; MG/100ML; MG/100ML
INJECTION, SOLUTION INTRAVENOUS CONTINUOUS
Status: DISCONTINUED | OUTPATIENT
Start: 2019-12-16 | End: 2019-12-18

## 2019-12-15 RX ORDER — HYDROCODONE BITARTRATE AND ACETAMINOPHEN 5; 325 MG/1; MG/1
1 TABLET ORAL EVERY 6 HOURS PRN
Status: DISCONTINUED | OUTPATIENT
Start: 2019-12-15 | End: 2019-12-18 | Stop reason: HOSPADM

## 2019-12-15 RX ORDER — SODIUM CHLORIDE 0.9 % (FLUSH) 0.9 %
10 SYRINGE (ML) INJECTION
Status: DISCONTINUED | OUTPATIENT
Start: 2019-12-15 | End: 2019-12-18 | Stop reason: HOSPADM

## 2019-12-15 RX ORDER — LOSARTAN POTASSIUM 50 MG/1
50 TABLET ORAL DAILY
Status: DISCONTINUED | OUTPATIENT
Start: 2019-12-16 | End: 2019-12-18 | Stop reason: HOSPADM

## 2019-12-15 RX ORDER — INSULIN ASPART 100 [IU]/ML
0-5 INJECTION, SOLUTION INTRAVENOUS; SUBCUTANEOUS
Status: DISCONTINUED | OUTPATIENT
Start: 2019-12-15 | End: 2019-12-18 | Stop reason: HOSPADM

## 2019-12-15 RX ORDER — TAMSULOSIN HYDROCHLORIDE 0.4 MG/1
0.4 CAPSULE ORAL DAILY
Status: DISCONTINUED | OUTPATIENT
Start: 2019-12-16 | End: 2019-12-18 | Stop reason: HOSPADM

## 2019-12-15 RX ORDER — ONDANSETRON 2 MG/ML
4 INJECTION INTRAMUSCULAR; INTRAVENOUS EVERY 8 HOURS PRN
Status: DISCONTINUED | OUTPATIENT
Start: 2019-12-15 | End: 2019-12-18 | Stop reason: HOSPADM

## 2019-12-15 RX ORDER — IBUPROFEN 200 MG
24 TABLET ORAL
Status: DISCONTINUED | OUTPATIENT
Start: 2019-12-15 | End: 2019-12-18 | Stop reason: HOSPADM

## 2019-12-15 RX ORDER — ACETAMINOPHEN 325 MG/1
650 TABLET ORAL EVERY 4 HOURS PRN
Status: DISCONTINUED | OUTPATIENT
Start: 2019-12-15 | End: 2019-12-18 | Stop reason: HOSPADM

## 2019-12-15 RX ORDER — GABAPENTIN 300 MG/1
300 CAPSULE ORAL 2 TIMES DAILY
Status: DISCONTINUED | OUTPATIENT
Start: 2019-12-15 | End: 2019-12-18 | Stop reason: HOSPADM

## 2019-12-15 RX ADMIN — SODIUM CHLORIDE 1000 ML: 0.9 INJECTION, SOLUTION INTRAVENOUS at 06:12

## 2019-12-15 RX ADMIN — GABAPENTIN 300 MG: 300 CAPSULE ORAL at 11:12

## 2019-12-15 RX ADMIN — SODIUM CHLORIDE, POTASSIUM CHLORIDE, SODIUM LACTATE AND CALCIUM CHLORIDE: 600; 310; 30; 20 INJECTION, SOLUTION INTRAVENOUS at 09:12

## 2019-12-15 RX ADMIN — SODIUM CHLORIDE, POTASSIUM CHLORIDE, SODIUM LACTATE AND CALCIUM CHLORIDE: 600; 310; 30; 20 INJECTION, SOLUTION INTRAVENOUS at 11:12

## 2019-12-15 NOTE — ED TRIAGE NOTES
Pt presents to ed via [facility] s/p fall 3 days ago at [facility]. Per son, pt was ambulating from the restroom with walker, trip and fell. The son states that his father had no LOC or head trauma. It was noted that the pt had an xray of the L leg with noted dislocation, fracture, shortened, and externally rotated. Pt AAOx4, resp pattern even and non labored.

## 2019-12-15 NOTE — ED NOTES
Bed: Incoming ED Transfer 1  Expected date:   Expected time:   Means of arrival:   Comments:  Chaparrita fall/hip

## 2019-12-15 NOTE — ED PROVIDER NOTES
Encounter Date: 12/15/2019    SCRIBE #1 NOTE: I, Aurora Vences, am scribing for, and in the presence of, Dr. Weller.       History     Chief Complaint   Patient presents with    Hip Pain     Per [facility] pt transported from [facility] w/ reported fall from nursing staff x3 days ago on 12/13. + left lower extremity deformity. + increased falls reported.      Time seen by provider: 5:10 PM    This is a 89 y.o. male with hx of AFIB with pacemaker placed and Parkinson's disease who presents via [facility]  from [facility] with complaint of L hip pain s/p mechanical fall yesterday. No head trauma or LOC. Per EMS, mobile x-ray was done at the nursing home today which showed a L hip fracture. Per nursing home staff, pt has had increased falls recently. Per son,pt follows with  PCP Dr. Ramirez, orthopedist Dr. Sandhu and cardiologist Dr. Cunningham.     The history is provided by the patient, a relative, the nursing home and the EMS personnel.     Review of patient's allergies indicates:   Allergen Reactions    Demerol [meperidine]      Past Medical History:   Diagnosis Date    *Atrial fibrillation     AI (aortic insufficiency)     mild    Atrial fibrillation     Diabetes mellitus     Diabetes mellitus, type 2     Hyperlipemia 11/27/2012    Hyperlipidemia     Hypertension     Memory loss     Parkinson disease     Spinal stenosis memory loss     Past Surgical History:   Procedure Laterality Date    CHOLECYSTECTOMY       No family history on file.  Social History     Tobacco Use    Smoking status: Never Smoker    Smokeless tobacco: Never Used   Substance Use Topics    Alcohol use: No    Drug use: No     Review of Systems   Constitutional: Negative for activity change, appetite change, chills and fever.   HENT: Negative for congestion and sinus pressure.    Eyes: Negative for discharge.   Respiratory: Negative for cough, choking, chest tightness and shortness of breath.     Cardiovascular: Negative for chest pain.   Gastrointestinal: Negative for abdominal pain, diarrhea and vomiting.   Genitourinary: Negative for difficulty urinating and dysuria.   Musculoskeletal: Positive for arthralgias (L hip).   Skin: Negative for color change.   Neurological: Negative for dizziness, syncope and headaches.   Hematological: Does not bruise/bleed easily.       Physical Exam     Initial Vitals   BP Pulse Resp Temp SpO2   12/15/19 1706 12/15/19 1659 12/15/19 1659 12/15/19 1659 12/15/19 1659   (!) 100/55 70 16 97.6 °F (36.4 °C) (!) 91 %      MAP       --                Physical Exam    Nursing note and vitals reviewed.  Constitutional: He appears well-developed. He is not diaphoretic.   Frail elderly male. No distress. Poor short term memory.   HENT:   Head: Normocephalic and atraumatic.   Dry mucous membranes.   Eyes: Conjunctivae and EOM are normal. Pupils are equal, round, and reactive to light. No scleral icterus.   Neck: Normal range of motion. Neck supple.   Cardiovascular: Normal rate, regular rhythm and normal heart sounds. Exam reveals no gallop and no friction rub.    No murmur heard.  Pulses:       Dorsalis pedis pulses are 1+ on the right side, and 1+ on the left side.   Pulmonary/Chest: Breath sounds normal. No respiratory distress. He has no wheezes. He has no rhonchi. He has no rales.   Abdominal: Soft. Bowel sounds are normal. He exhibits no distension. There is no tenderness. There is no rebound and no guarding.   Musculoskeletal:   3+ pitting edema to bilateral lower extremities. L leg shortened and extrernally rotated. Pain with ROM of L hip.   Neurological: He is alert and oriented to person, place, and time.   Normal distal strength and sensation.   Skin: Skin is warm and dry.   Psychiatric: He has a normal mood and affect. His behavior is normal. Judgment and thought content normal.         ED Course   Procedures  Labs Reviewed   CBC W/ AUTO DIFFERENTIAL - Abnormal; Notable for  the following components:       Result Value    RBC 3.91 (*)     Hemoglobin 12.8 (*)     Hematocrit 38.5 (*)     Mean Corpuscular Volume 99 (*)     Mean Corpuscular Hemoglobin 32.7 (*)     Platelets 119 (*)     Immature Granulocytes 0.9 (*)     Gran # (ANC) 9.3 (*)     Immature Grans (Abs) 0.11 (*)     Lymph # 0.8 (*)     Mono # 1.4 (*)     Gran% 79.5 (*)     Lymph% 6.5 (*)     All other components within normal limits   COMPREHENSIVE METABOLIC PANEL - Abnormal; Notable for the following components:    Glucose 140 (*)     BUN, Bld 41 (*)     Creatinine 2.9 (*)     Albumin 3.4 (*)     Total Bilirubin 2.0 (*)     ALT 7 (*)     eGFR if  21 (*)     eGFR if non  18 (*)     All other components within normal limits   PROTEIN / CREATININE RATIO, URINE - Abnormal; Notable for the following components:    Protein, Urine Random 66 (*)     Prot/Creat Ratio, Ur 0.29 (*)     All other components within normal limits   PROTIME-INR   APTT   SODIUM, URINE, RANDOM   OSMOLALITY, URINE RANDOM   TYPE & SCREEN     EKG Readings: (Independently Interpreted)   Ventricular paced rhythm at a rate of 70 bpm. Widened QRS. No acute changes compared to EKG tracing from April 2019.       Imaging Results           X-Ray Hip 2 View Left (Final result)  Result time 12/15/19 18:44:28    Final result by Axel Mcgee MD (12/15/19 18:44:28)                 Impression:      Acute intertrochanteric fracture of the proximal left femur.    This report was flagged in Epic as abnormal.      Electronically signed by: Axel Mcgee  Date:    12/15/2019  Time:    18:44             Narrative:    EXAMINATION:  XR HIP 2 VIEW LEFT    CLINICAL HISTORY:  Injury, unspecified, initial encounter    TECHNIQUE:  AP view of the pelvis and frog leg lateral view of the left hip were performed.    COMPARISON:  November 11, 2019    FINDINGS:  Radiographic examination of the left hip was performed, 2 radiographs are submitted.  There is  acute fracture deformity involving the proximal left femur, consistent with inter trochanteric fracture with evidence for distraction and displacement of fracture components including mild medial distraction/displacement of the lesser trochanter.  There is mild angulation at the fracture site.  There is no evidence for acetabulofemoral dislocation.  The right hip joint appears intact without evidence for dislocation, the osseous structures otherwise demonstrate chronic change without additional evidence for acute fracture deformity.  Vascular calcifications and suspected phleboliths are noted.                              X-Rays:   Independently Interpreted Readings:   Other Readings:  X-Ray Hip 2 View Left: Comminuted intertrochanteric fracture.     Medical Decision Making:   History:   Old Medical Records: I decided to obtain old medical records.  Independently Interpreted Test(s):   I have ordered and independently interpreted X-rays - see prior notes.  I have ordered and independently interpreted EKG Reading(s) - see prior notes  Clinical Tests:   Lab Tests: Ordered and Reviewed  Radiological Study: Ordered and Reviewed  Medical Tests: Ordered and Reviewed            Scribe Attestation:   Scribe #1: I performed the above scribed service and the documentation accurately describes the services I performed. I attest to the accuracy of the note.    Attending Attestation:           Physician Attestation for Scribe:  Physician Attestation Statement for Scribe #1: I, Dr. Weller, reviewed documentation, as scribed by Aurora Vences in my presence, and it is both accurate and complete.                 ED Course as of Dec 15 2321   Sun Dec 15, 2019   1833 Case discussed with Ms. Sandeep, admit to Dr. Little.    [TA]   1858 Case discussed with PCP Dr. Ramirez, updated on findings and plan of care.    [TA]      ED Course User Index  [TA] Aurora Marthajesus            Patient presents by EMS from his nursing home.  Patient has  been unsteady with frequent falls lately but for recent fall 2 days ago apparently was unable to ambulate and x-ray performed earlier today reportedly showed hip fracture.  I do not have access these images therefore it was repeated, does confirm left intratrochanteric fracture, consistent with the shortening and rotation on exam.  He appears dehydrated on exam and Laboratory studies do show acute elevation of creatinine and BUN, likely secondary to the dehydration.  Will be started on IV fluid repletion.  Case discussed with Orthopedic service who plans operative repair tomorrow.  Case discussed with both the hospitalist service to me will be admitted and his primary care.  Son has been at bedside for much of the visit and was updated with findings and plan of care      Clinical Impression:     1. Closed displaced intertrochanteric fracture of left femur, initial encounter    2. Fall    3. Hypotension    4. Injury    5. ENZO (acute kidney injury)    6. Dehydration                              Jaison Weller II, MD  12/15/19 0563

## 2019-12-16 ENCOUNTER — ANESTHESIA (OUTPATIENT)
Dept: SURGERY | Facility: OTHER | Age: 84
DRG: 481 | End: 2019-12-16
Payer: MEDICARE

## 2019-12-16 ENCOUNTER — ANESTHESIA EVENT (OUTPATIENT)
Dept: SURGERY | Facility: OTHER | Age: 84
DRG: 481 | End: 2019-12-16
Payer: MEDICARE

## 2019-12-16 LAB
ANION GAP SERPL CALC-SCNC: 14 MMOL/L (ref 8–16)
BASOPHILS # BLD AUTO: 0.03 K/UL (ref 0–0.2)
BASOPHILS NFR BLD: 0.3 % (ref 0–1.9)
BUN SERPL-MCNC: 40 MG/DL (ref 8–23)
CALCIUM SERPL-MCNC: 9.1 MG/DL (ref 8.7–10.5)
CHLORIDE SERPL-SCNC: 104 MMOL/L (ref 95–110)
CO2 SERPL-SCNC: 18 MMOL/L (ref 23–29)
CREAT SERPL-MCNC: 2.1 MG/DL (ref 0.5–1.4)
DIFFERENTIAL METHOD: ABNORMAL
EOSINOPHIL # BLD AUTO: 0.2 K/UL (ref 0–0.5)
EOSINOPHIL NFR BLD: 1.7 % (ref 0–8)
ERYTHROCYTE [DISTWIDTH] IN BLOOD BY AUTOMATED COUNT: 14.2 % (ref 11.5–14.5)
EST. GFR  (AFRICAN AMERICAN): 31 ML/MIN/1.73 M^2
EST. GFR  (NON AFRICAN AMERICAN): 27 ML/MIN/1.73 M^2
GLUCOSE SERPL-MCNC: 84 MG/DL (ref 70–110)
HCT VFR BLD AUTO: 41.3 % (ref 40–54)
HGB BLD-MCNC: 13.4 G/DL (ref 14–18)
IMM GRANULOCYTES # BLD AUTO: 0.07 K/UL (ref 0–0.04)
IMM GRANULOCYTES NFR BLD AUTO: 0.7 % (ref 0–0.5)
LYMPHOCYTES # BLD AUTO: 1 K/UL (ref 1–4.8)
LYMPHOCYTES NFR BLD: 10.4 % (ref 18–48)
MCH RBC QN AUTO: 32.4 PG (ref 27–31)
MCHC RBC AUTO-ENTMCNC: 32.4 G/DL (ref 32–36)
MCV RBC AUTO: 100 FL (ref 82–98)
MONOCYTES # BLD AUTO: 1.1 K/UL (ref 0.3–1)
MONOCYTES NFR BLD: 11.1 % (ref 4–15)
NEUTROPHILS # BLD AUTO: 7.2 K/UL (ref 1.8–7.7)
NEUTROPHILS NFR BLD: 75.8 % (ref 38–73)
NRBC BLD-RTO: 0 /100 WBC
PLATELET # BLD AUTO: 112 K/UL (ref 150–350)
PMV BLD AUTO: 10.9 FL (ref 9.2–12.9)
POCT GLUCOSE: 141 MG/DL (ref 70–110)
POCT GLUCOSE: 72 MG/DL (ref 70–110)
POTASSIUM SERPL-SCNC: 4.6 MMOL/L (ref 3.5–5.1)
RBC # BLD AUTO: 4.13 M/UL (ref 4.6–6.2)
SODIUM SERPL-SCNC: 136 MMOL/L (ref 136–145)
WBC # BLD AUTO: 9.5 K/UL (ref 3.9–12.7)

## 2019-12-16 PROCEDURE — 25000003 PHARM REV CODE 250: Performed by: PHYSICIAN ASSISTANT

## 2019-12-16 PROCEDURE — 99223 1ST HOSP IP/OBS HIGH 75: CPT | Mod: ,,, | Performed by: PHYSICIAN ASSISTANT

## 2019-12-16 PROCEDURE — 63600175 PHARM REV CODE 636 W HCPCS: Performed by: ORTHOPAEDIC SURGERY

## 2019-12-16 PROCEDURE — 37000009 HC ANESTHESIA EA ADD 15 MINS: Performed by: ORTHOPAEDIC SURGERY

## 2019-12-16 PROCEDURE — C1769 GUIDE WIRE: HCPCS | Performed by: ORTHOPAEDIC SURGERY

## 2019-12-16 PROCEDURE — 80048 BASIC METABOLIC PNL TOTAL CA: CPT

## 2019-12-16 PROCEDURE — 37000008 HC ANESTHESIA 1ST 15 MINUTES: Performed by: ORTHOPAEDIC SURGERY

## 2019-12-16 PROCEDURE — 11000001 HC ACUTE MED/SURG PRIVATE ROOM

## 2019-12-16 PROCEDURE — 27201423 OPTIME MED/SURG SUP & DEVICES STERILE SUPPLY: Performed by: ORTHOPAEDIC SURGERY

## 2019-12-16 PROCEDURE — 63600175 PHARM REV CODE 636 W HCPCS: Performed by: ANESTHESIOLOGY

## 2019-12-16 PROCEDURE — 71000039 HC RECOVERY, EACH ADD'L HOUR: Performed by: ORTHOPAEDIC SURGERY

## 2019-12-16 PROCEDURE — 64447 NJX AA&/STRD FEMORAL NRV IMG: CPT | Performed by: ANESTHESIOLOGY

## 2019-12-16 PROCEDURE — 25000003 PHARM REV CODE 250: Performed by: NURSE ANESTHETIST, CERTIFIED REGISTERED

## 2019-12-16 PROCEDURE — 25000003 PHARM REV CODE 250: Performed by: ORTHOPAEDIC SURGERY

## 2019-12-16 PROCEDURE — 99223 PR INITIAL HOSPITAL CARE,LEVL III: ICD-10-PCS | Mod: ,,, | Performed by: PHYSICIAN ASSISTANT

## 2019-12-16 PROCEDURE — C1713 ANCHOR/SCREW BN/BN,TIS/BN: HCPCS | Performed by: ORTHOPAEDIC SURGERY

## 2019-12-16 PROCEDURE — 63600175 PHARM REV CODE 636 W HCPCS: Performed by: NURSE ANESTHETIST, CERTIFIED REGISTERED

## 2019-12-16 PROCEDURE — 63600175 PHARM REV CODE 636 W HCPCS: Performed by: PHYSICIAN ASSISTANT

## 2019-12-16 PROCEDURE — 36000711: Performed by: ORTHOPAEDIC SURGERY

## 2019-12-16 PROCEDURE — 63600175 PHARM REV CODE 636 W HCPCS: Mod: JG | Performed by: NURSE ANESTHETIST, CERTIFIED REGISTERED

## 2019-12-16 PROCEDURE — P9045 ALBUMIN (HUMAN), 5%, 250 ML: HCPCS | Mod: JG | Performed by: NURSE ANESTHETIST, CERTIFIED REGISTERED

## 2019-12-16 PROCEDURE — 36415 COLL VENOUS BLD VENIPUNCTURE: CPT

## 2019-12-16 PROCEDURE — 36000710: Performed by: ORTHOPAEDIC SURGERY

## 2019-12-16 PROCEDURE — 71000033 HC RECOVERY, INTIAL HOUR: Performed by: ORTHOPAEDIC SURGERY

## 2019-12-16 PROCEDURE — 85025 COMPLETE CBC W/AUTO DIFF WBC: CPT

## 2019-12-16 DEVICE — WIRE GUIDE 3.2MM 400MM: Type: IMPLANTABLE DEVICE | Site: HIP | Status: FUNCTIONAL

## 2019-12-16 DEVICE — SCREW LOCKING 40MM: Type: IMPLANTABLE DEVICE | Site: HIP | Status: FUNCTIONAL

## 2019-12-16 DEVICE — NAIL IM CANN 130 DEG 11X170: Type: IMPLANTABLE DEVICE | Site: HIP | Status: FUNCTIONAL

## 2019-12-16 RX ORDER — ROCURONIUM BROMIDE 10 MG/ML
INJECTION, SOLUTION INTRAVENOUS
Status: DISCONTINUED | OUTPATIENT
Start: 2019-12-16 | End: 2019-12-16

## 2019-12-16 RX ORDER — NEOSTIGMINE METHYLSULFATE 1 MG/ML
INJECTION, SOLUTION INTRAVENOUS
Status: DISCONTINUED | OUTPATIENT
Start: 2019-12-16 | End: 2019-12-16

## 2019-12-16 RX ORDER — PHENYLEPHRINE HYDROCHLORIDE 10 MG/ML
INJECTION INTRAVENOUS
Status: DISCONTINUED | OUTPATIENT
Start: 2019-12-16 | End: 2019-12-16

## 2019-12-16 RX ORDER — MIDAZOLAM HYDROCHLORIDE 1 MG/ML
5 INJECTION INTRAMUSCULAR; INTRAVENOUS ONCE AS NEEDED
Status: CANCELLED | OUTPATIENT
Start: 2019-12-16 | End: 2031-05-14

## 2019-12-16 RX ORDER — ONDANSETRON 2 MG/ML
4 INJECTION INTRAMUSCULAR; INTRAVENOUS DAILY PRN
Status: DISCONTINUED | OUTPATIENT
Start: 2019-12-16 | End: 2019-12-16 | Stop reason: HOSPADM

## 2019-12-16 RX ORDER — AMOXICILLIN 250 MG
1 CAPSULE ORAL 2 TIMES DAILY
Status: DISCONTINUED | OUTPATIENT
Start: 2019-12-16 | End: 2019-12-18 | Stop reason: HOSPADM

## 2019-12-16 RX ORDER — POLYETHYLENE GLYCOL 3350 17 G/17G
17 POWDER, FOR SOLUTION ORAL DAILY
Status: DISCONTINUED | OUTPATIENT
Start: 2019-12-17 | End: 2019-12-18 | Stop reason: HOSPADM

## 2019-12-16 RX ORDER — CEFAZOLIN SODIUM 2 G/50ML
2 SOLUTION INTRAVENOUS
Status: COMPLETED | OUTPATIENT
Start: 2019-12-17 | End: 2019-12-17

## 2019-12-16 RX ORDER — LIDOCAINE HCL/PF 100 MG/5ML
SYRINGE (ML) INTRAVENOUS
Status: DISCONTINUED | OUTPATIENT
Start: 2019-12-16 | End: 2019-12-16

## 2019-12-16 RX ORDER — SODIUM CHLORIDE 9 MG/ML
INJECTION, SOLUTION INTRAVENOUS CONTINUOUS PRN
Status: DISCONTINUED | OUTPATIENT
Start: 2019-12-16 | End: 2019-12-16

## 2019-12-16 RX ORDER — ROPIVACAINE HYDROCHLORIDE 2 MG/ML
INJECTION, SOLUTION EPIDURAL; INFILTRATION; PERINEURAL
Status: DISCONTINUED | OUTPATIENT
Start: 2019-12-16 | End: 2019-12-16

## 2019-12-16 RX ORDER — OXYCODONE HYDROCHLORIDE 5 MG/1
5 TABLET ORAL
Status: DISCONTINUED | OUTPATIENT
Start: 2019-12-16 | End: 2019-12-16 | Stop reason: HOSPADM

## 2019-12-16 RX ORDER — ONDANSETRON 2 MG/ML
4 INJECTION INTRAMUSCULAR; INTRAVENOUS EVERY 12 HOURS PRN
Status: DISCONTINUED | OUTPATIENT
Start: 2019-12-16 | End: 2019-12-16

## 2019-12-16 RX ORDER — MEPERIDINE HYDROCHLORIDE 25 MG/ML
12.5 INJECTION INTRAMUSCULAR; INTRAVENOUS; SUBCUTANEOUS ONCE AS NEEDED
Status: DISCONTINUED | OUTPATIENT
Start: 2019-12-16 | End: 2019-12-16 | Stop reason: HOSPADM

## 2019-12-16 RX ORDER — METOCLOPRAMIDE HYDROCHLORIDE 5 MG/ML
5 INJECTION INTRAMUSCULAR; INTRAVENOUS EVERY 6 HOURS PRN
Status: DISCONTINUED | OUTPATIENT
Start: 2019-12-16 | End: 2019-12-18 | Stop reason: HOSPADM

## 2019-12-16 RX ORDER — ETOMIDATE 2 MG/ML
INJECTION INTRAVENOUS
Status: DISCONTINUED | OUTPATIENT
Start: 2019-12-16 | End: 2019-12-16

## 2019-12-16 RX ORDER — ACETAMINOPHEN 325 MG/1
650 TABLET ORAL EVERY 4 HOURS PRN
Status: DISCONTINUED | OUTPATIENT
Start: 2019-12-16 | End: 2019-12-16

## 2019-12-16 RX ORDER — SODIUM CHLORIDE 0.9 % (FLUSH) 0.9 %
2 SYRINGE (ML) INJECTION
Status: DISCONTINUED | OUTPATIENT
Start: 2019-12-16 | End: 2019-12-18 | Stop reason: HOSPADM

## 2019-12-16 RX ORDER — HYDROMORPHONE HYDROCHLORIDE 2 MG/ML
0.4 INJECTION, SOLUTION INTRAMUSCULAR; INTRAVENOUS; SUBCUTANEOUS EVERY 5 MIN PRN
Status: DISCONTINUED | OUTPATIENT
Start: 2019-12-16 | End: 2019-12-16 | Stop reason: HOSPADM

## 2019-12-16 RX ORDER — ONDANSETRON 2 MG/ML
INJECTION INTRAMUSCULAR; INTRAVENOUS
Status: DISCONTINUED | OUTPATIENT
Start: 2019-12-16 | End: 2019-12-16

## 2019-12-16 RX ORDER — SODIUM CHLORIDE 9 MG/ML
INJECTION, SOLUTION INTRAVENOUS CONTINUOUS
Status: DISCONTINUED | OUTPATIENT
Start: 2019-12-16 | End: 2019-12-18

## 2019-12-16 RX ORDER — GLYCOPYRROLATE 0.2 MG/ML
INJECTION INTRAMUSCULAR; INTRAVENOUS
Status: DISCONTINUED | OUTPATIENT
Start: 2019-12-16 | End: 2019-12-16

## 2019-12-16 RX ORDER — FENTANYL CITRATE 50 UG/ML
INJECTION, SOLUTION INTRAMUSCULAR; INTRAVENOUS
Status: DISCONTINUED | OUTPATIENT
Start: 2019-12-16 | End: 2019-12-16

## 2019-12-16 RX ORDER — ALBUMIN HUMAN 50 G/1000ML
SOLUTION INTRAVENOUS CONTINUOUS PRN
Status: DISCONTINUED | OUTPATIENT
Start: 2019-12-16 | End: 2019-12-16

## 2019-12-16 RX ORDER — FENTANYL CITRATE 50 UG/ML
100 INJECTION, SOLUTION INTRAMUSCULAR; INTRAVENOUS EVERY 5 MIN PRN
Status: CANCELLED | OUTPATIENT
Start: 2019-12-16

## 2019-12-16 RX ORDER — SODIUM CHLORIDE 0.9 % (FLUSH) 0.9 %
3 SYRINGE (ML) INJECTION
Status: DISCONTINUED | OUTPATIENT
Start: 2019-12-16 | End: 2019-12-18 | Stop reason: HOSPADM

## 2019-12-16 RX ORDER — BISACODYL 10 MG
10 SUPPOSITORY, RECTAL RECTAL DAILY PRN
Status: DISCONTINUED | OUTPATIENT
Start: 2019-12-16 | End: 2019-12-18 | Stop reason: HOSPADM

## 2019-12-16 RX ADMIN — ONDANSETRON 4 MG: 2 INJECTION INTRAMUSCULAR; INTRAVENOUS at 04:12

## 2019-12-16 RX ADMIN — SODIUM CHLORIDE, POTASSIUM CHLORIDE, SODIUM LACTATE AND CALCIUM CHLORIDE: 600; 310; 30; 20 INJECTION, SOLUTION INTRAVENOUS at 12:12

## 2019-12-16 RX ADMIN — PHENYLEPHRINE HYDROCHLORIDE 100 MCG: 10 INJECTION INTRAVENOUS at 05:12

## 2019-12-16 RX ADMIN — CARBIDOPA AND LEVODOPA 2 TABLET: 25; 100 TABLET ORAL at 08:12

## 2019-12-16 RX ADMIN — SODIUM CHLORIDE, POTASSIUM CHLORIDE, SODIUM LACTATE AND CALCIUM CHLORIDE: 600; 310; 30; 20 INJECTION, SOLUTION INTRAVENOUS at 03:12

## 2019-12-16 RX ADMIN — GLYCOPYRROLATE 0.6 MG: 0.2 INJECTION, SOLUTION INTRAMUSCULAR; INTRAVENOUS at 04:12

## 2019-12-16 RX ADMIN — PHENYLEPHRINE HYDROCHLORIDE 100 MCG: 10 INJECTION INTRAVENOUS at 04:12

## 2019-12-16 RX ADMIN — CARBIDOPA AND LEVODOPA 2 TABLET: 25; 100 TABLET ORAL at 09:12

## 2019-12-16 RX ADMIN — FENTANYL CITRATE 50 MCG: 50 INJECTION, SOLUTION INTRAMUSCULAR; INTRAVENOUS at 03:12

## 2019-12-16 RX ADMIN — SODIUM CHLORIDE: 0.9 INJECTION, SOLUTION INTRAVENOUS at 07:12

## 2019-12-16 RX ADMIN — LIDOCAINE HYDROCHLORIDE 50 MG: 20 INJECTION, SOLUTION INTRAVENOUS at 03:12

## 2019-12-16 RX ADMIN — ROPIVACAINE HYDROCHLORIDE 50 ML: 2 INJECTION, SOLUTION EPIDURAL; INFILTRATION at 03:12

## 2019-12-16 RX ADMIN — ACETAMINOPHEN 650 MG: 325 TABLET ORAL at 10:12

## 2019-12-16 RX ADMIN — SODIUM CHLORIDE: 0.9 INJECTION, SOLUTION INTRAVENOUS at 12:12

## 2019-12-16 RX ADMIN — DOCUSATE SODIUM AND SENNOSIDES 1 TABLET: 50; 8.6 TABLET, FILM COATED ORAL at 08:12

## 2019-12-16 RX ADMIN — LOSARTAN POTASSIUM 50 MG: 50 TABLET, FILM COATED ORAL at 09:12

## 2019-12-16 RX ADMIN — ALBUMIN (HUMAN): 2.5 SOLUTION INTRAVENOUS at 04:12

## 2019-12-16 RX ADMIN — TAMSULOSIN HYDROCHLORIDE 0.4 MG: 0.4 CAPSULE ORAL at 09:12

## 2019-12-16 RX ADMIN — GABAPENTIN 300 MG: 300 CAPSULE ORAL at 09:12

## 2019-12-16 RX ADMIN — PRAMIPEXOLE DIHYDROCHLORIDE 1 MG: 0.5 TABLET ORAL at 08:12

## 2019-12-16 RX ADMIN — ETOMIDATE 16 MG: 2 INJECTION, SOLUTION INTRAVENOUS at 03:12

## 2019-12-16 RX ADMIN — NEOSTIGMINE METHYLSULFATE 5 MG: 1 INJECTION INTRAVENOUS at 04:12

## 2019-12-16 RX ADMIN — DEXTROSE 2 G: 50 INJECTION, SOLUTION INTRAVENOUS at 03:12

## 2019-12-16 RX ADMIN — GABAPENTIN 300 MG: 300 CAPSULE ORAL at 08:12

## 2019-12-16 RX ADMIN — PRAMIPEXOLE DIHYDROCHLORIDE 1 MG: 0.5 TABLET ORAL at 09:12

## 2019-12-16 RX ADMIN — DILTIAZEM HYDROCHLORIDE 120 MG: 120 CAPSULE, COATED, EXTENDED RELEASE ORAL at 09:12

## 2019-12-16 RX ADMIN — ROCURONIUM BROMIDE 50 MG: 10 INJECTION, SOLUTION INTRAVENOUS at 03:12

## 2019-12-16 NOTE — ASSESSMENT & PLAN NOTE
- continue home meds: diltiazem   - hold ASA   - monitor on tele   - has [employer] Pacemaker   - follows w/ Cardiology, Dr. Juan

## 2019-12-16 NOTE — PLAN OF CARE
Remains free from fall, injury, and skin breakdown. NPO since midnight. Bladder incontinence, linens/absorbent pads changed, dom care performed. Bed rest maintained. VSS on RA throughout the night. Positions self with assistance. Denies pain. Tele maintained, paced rhythm; all alarms active and audible. Bottom dentures at bedside, top dentures still in place. Peripheral IV/dressing CDI. Maintenance fluids running. Blood glucose checks maintained. SCDs in place. Plan of care reviewed with patient/son and all questions answered. Bed low, locked w/ bed alarm on. Call light within reach. Purposeful rounding performed. More calm now, less combative, resting in bed, no other complaints at this time.

## 2019-12-16 NOTE — HPI
Mr. Silvano Parmar is a 89 y.o. male, with PMH of A. Fib, s/p [employer] Sven Pacemaker insertion, thrombocytopenia, NIDDM-2, diabatic neuropathy, Parkinson's Disease, CKD-II, HTN, who [place] [facility] [place] from [facility] after he fell three days TPA and had persistent left hip pain. He had a deformity of the LLE. He denied head trauma of LOC. A mobile x-ray completed at [facility] today showed a left hip fracture. The nursing staff reported he has been having increased falls lately. In the ED, imaging confirmed an acute left intertrochanteric fracture of the left femur. Labs additionally showed persistence of thrombocytopenia, as well as elevated BUN/Cr above baseline. He was admitted to inpatient status.

## 2019-12-16 NOTE — PROGRESS NOTES
Orthopaedic Consult Received    Plan of Care:  Chart reviewed. Patient with intertrochanteric fracture of left femur. Requires operative treatment. Will add on to my cases tomorrow. Likely timing around 2pm. Will see patient in the morning. Admitted to hospital medicine. Hb stable, has possible ENZO based on labs, not on potent anticoagulant     NWB, no traction needed  PO/IV pain meds   NPO at midnight, IV fluids  Hold anticoagulation, can continue ASA  Pre-op labs, T&S  Will obtain consent and book case in AM

## 2019-12-16 NOTE — ANESTHESIA PROCEDURE NOTES
Intubation  Performed by: Stuart Youssef Jr. CRNA  Authorized by: Tucker Mccoy MD     Intubation:     Induction:  Intravenous    Intubated:  Postinduction    Mask Ventilation:  Moderately difficult with oral airway    Attempts:  1    Attempted By:  CRNA    Method of Intubation:  Direct and video laryngoscopy    Blade:  Carr 3    Laryngeal View Grade: Grade I - full view of chords      Difficult Airway Encountered?: No      Complications:  None    Airway Device:  Oral endotracheal tube    Airway Device Size:  7.5    Style/Cuff Inflation:  Cuffed (inflated to minimal occlusive pressure)    Inflation Amount (mL):  6    Tube secured:  23    Secured at:  The lips    Placement Verified By:  Capnometry    Complicating Factors:  None    Findings Post-Intubation:  BS equal bilateral and atraumatic/condition of teeth unchanged

## 2019-12-16 NOTE — NURSING
Pt pulled off condom cath/spilled leg bag onto sheets. Sheets/absorbent pads changed, pericare performed. Extra absorbent pads added. Pt is confused/disoriented to time, place, situation and combative. Trying to get out of bed, swinging arms/leg around, grabbing nurses, spitting. Keeps throwing off gown and removing coban from IV site. Bed alarm on, frequently checking pt. Will continue to monitor.

## 2019-12-16 NOTE — CONSULTS
CONSULT ORTHOPAEDIC      ASSESSMENT & PLAN:    Impression: 89 year old male s/p fall with left IT hip fracture    Discussion: We discussed the patient condition with the DPOA as patient has parkinsons disease and some level of cognitive impairment. We discussed the operative recommendation for this injury which is IMN of the left hip. The risks benefits and alternatives were discussed. Following this, the DPOA (the patient's son) elected for surgery. Full informed consent was obtained. Patient will be booked for surgery.     The patient has been ordered: pre operative labs, npo    Patient Active Problem List   Diagnosis    Persistent atrial fibrillation    Aortic stenosis    Parkinson disease    Type 2 diabetes mellitus, without long-term current use of insulin    Essential hypertension    Benign prostatic hyperplasia    GERD (gastroesophageal reflux disease)    Spinal stenosis    Chronic idiopathic thrombocytopenia    TIA (transient ischemic attack)    Debility    Closed intertrochanteric fracture of hip, left, initial encounter    Acute on chronic renal insufficiency    Closed displaced intertrochanteric fracture of left femur          SUBJECTIVE  CHIEF COMPLAINT: left hip fracture    HPI:   Mr. Silvano Parmar is a 89 y.o. male, with PMH of A. Fib, s/p Pacemaker insertion, thrombocytopenia, NIDDM-2, diabatic neuropathy, Parkinson's Disease, CKD-II, HTN, who [place] [facility] [place] from [facility] after he fell three days TPA and had persistent left hip pain. He had a deformity of the LLE. He denied head trauma of LOC. A x-ray completed at [facility] yesterday showed a left hip fracture. The nursing staff reported he has been having increased falls lately. In the ED, imaging confirmed an acute left intertrochanteric fracture of the left femur. Labs additionally showed persistence of thrombocytopenia, as well as elevated BUN/Cr above baseline. He was admitted to inpatient status.       Silvano Parmar has no additional complaints.     FUNCTIONAL STATUS:   Partially dependent  Limited most or all of the time (uses scooter, mobility device)    REVIEW OF SYSTEMS:   PAIN ASSESSMENT:  See HPI.  MUSCULOSKELETAL: See HPI.    Past Medical History:   Diagnosis Date    *Atrial fibrillation     AI (aortic insufficiency)     mild    Atrial fibrillation     Diabetes mellitus     Diabetes mellitus, type 2     Hyperlipemia 11/27/2012    Hyperlipidemia     Hypertension     Memory loss     Parkinson disease     Spinal stenosis memory loss       Past Surgical History:   Procedure Laterality Date    CHOLECYSTECTOMY         History reviewed. No pertinent family history.    Social History     Socioeconomic History    Marital status:      Spouse name: Not on file    Number of children: Not on file    Years of education: Not on file    Highest education level: Not on file   Occupational History    Not on file   Social Needs    Financial resource strain: Not on file    Food insecurity:     Worry: Not on file     Inability: Not on file    Transportation needs:     Medical: Not on file     Non-medical: Not on file   Tobacco Use    Smoking status: Never Smoker    Smokeless tobacco: Never Used   Substance and Sexual Activity    Alcohol use: No    Drug use: No    Sexual activity: Never   Lifestyle    Physical activity:     Days per week: Not on file     Minutes per session: Not on file    Stress: Not on file   Relationships    Social connections:     Talks on phone: Not on file     Gets together: Not on file     Attends Gnosticism service: Not on file     Active member of club or organization: Not on file     Attends meetings of clubs or organizations: Not on file     Relationship status: Not on file   Other Topics Concern    Not on file   Social History Narrative    Not on file       Review of patient's allergies indicates:   Allergen Reactions    Demerol [meperidine]        No  current facility-administered medications on file prior to encounter.      Current Outpatient Medications on File Prior to Encounter   Medication Sig Dispense Refill    acetaminophen (TYLENOL) 325 mg Cap Take by mouth.      aspirin (ECOTRIN) 81 MG EC tablet Take 1 tablet (81 mg total) by mouth once daily.  0    carbidopa-levodopa  mg (SINEMET)  mg per tablet Take 2 tablets by mouth 3 (three) times daily.       cholecalciferol, vitamin D3, (VITAMIN D3) 4,000 unit Cap Take 1 capsule by mouth once daily.       diltiaZEM (CARDIZEM CD) 120 MG Cp24 TAKE ONE CAPSULE BY MOUTH ONCE DAILY 90 capsule 3    esomeprazole (NEXIUM) 40 MG capsule Take 22.3 mg by mouth before breakfast.       fluticasone (FLONASE) 50 mcg/actuation nasal spray 1 spray (50 mcg total) by Each Nare route once daily. 16 g 11    FOLIC ACID/MULTIVITS-MIN/LUT (CENTRUM SILVER ORAL) Take 1 tablet by mouth once daily.      furosemide (LASIX) 20 MG tablet TAKE 1 TABLET BY MOUTH EVERY DAY 90 tablet 0    gabapentin (NEURONTIN) 300 MG capsule TAKE ONE CAPSULE BY MOUTH TWICE DAILY 60 capsule 6    GENERLAC 10 gram/15 mL solution Take 10 g by mouth daily as needed.       guaifenesin 100 mg/5 ml (ROBITUSSIN) 100 mg/5 mL syrup Take 200 mg by mouth every 4 (four) hours as needed for Cough.      JANUVIA 50 mg Tab TAKE ONE TABLET BY MOUTH ONCE DAILY 90 tablet 3    losartan (COZAAR) 50 MG tablet TAKE ONE TABLET BY MOUTH ONCE DAILY 30 tablet 6    meloxicam (MOBIC) 15 MG tablet Take 15 mg by mouth daily as needed for Pain.      mirabegron (MYRBETRIQ) 50 mg Tb24 Take 1 tablet (50 mg total) by mouth once daily. 30 tablet 11    pramipexole (MIRAPEX) 1 MG tablet Take 1 tablet by mouth 3 (three) times daily.      tamsulosin (FLOMAX) 0.4 mg Cap Take 1 capsule (0.4 mg total) by mouth once daily. 90 capsule 3       PHYSICAL EXAM  /78 (BP Location: Right arm, Patient Position: Lying)   Pulse 76   Temp 97.6 °F (36.4 °C) (Oral)   Resp 12   Ht 6'  (1.829 m)   Wt 95.3 kg (210 lb 1.6 oz)   SpO2 100%   BMI 28.49 kg/m²     All other systems deferred.  GENERAL:  No Acute Distress     HIP EXAM: Left   Log roll: painful.    ER rotated, shortened    Neurovascular Status: Sensation Intact and Moves foot and ankle up & down     CBC:   Recent Labs   Lab 12/16/19  0428   WBC 9.50   RBC 4.13*   HGB 13.4*   HCT 41.3   *   *   MCH 32.4*   MCHC 32.4       CMP:   Recent Labs   Lab 12/15/19  1726 12/16/19  0428   * 84   CALCIUM 9.2 9.1   ALBUMIN 3.4*  --    PROT 6.5  --     136   K 3.9 4.6   CO2 23 18*    104   BUN 41* 40*   CREATININE 2.9* 2.1*   ALKPHOS 90  --    ALT 7*  --    AST 25  --    BILITOT 2.0*  --        DATA:  Diagnostic tests reviewed: radiographs revealed left IT hip fracture with lesser trochanter comminution.

## 2019-12-16 NOTE — ASSESSMENT & PLAN NOTE
- last A1C:   Lab Results   Component Value Date    HGBA1C 5.9 (H) 04/08/2019   - hold oral antidiabetic meds   - Diabetic diet   - SSI with acckelly AC/HS

## 2019-12-16 NOTE — ED NOTES
Pt resting bed calmly RR easy non labored, NAD. Baseline mental status, VSS. Side rails up x2, call light within reach, bed in lowest locked position, will continue to monitor and assess for changes.

## 2019-12-16 NOTE — OP NOTE
OPERATIVE NOTE     PATIENT NAME: Silvano Parmar   MRN: [id number]        Surgery Date:12/16/19    Surgeon(s) and Assistant(s): Max Mcclellan MD. Alba Sterling.      Procedure(s): Intramedullary nailing of Left Hip    BMI: Body mass index is 28.49 kg/m².      Anesthesia:  General     Attestation:   I was present for all of the critical portions of the operation and performed all critical portions.  The medial assistant performed the superficial closure under supervision, and assisted during the operation. I was immediately available for the duration of the entire case.      Preoperative Diagnosis:  Left Hip Fracture     Postoperative Diagnosis: Same     Findings:  See Full Operative Report    Complications: None     EBL:50 cc     Implants:   Implant Name Type Inv. Item Serial No.  Lot No. LRB No. Used   NAIL IM SERINA 130 DEG [id number] - [id number]  NAIL IM SERINA 130 DEG [id number]  [employer] [id number] Left 1   tfna fenestrated helical blade 115mm     [id number] Left 1   SCREW LOCKING 40MM - [id number]  SCREW LOCKING 40MM  SYNTHES [id number] Left 1        Drains: None     Problem List:   Patient Active Problem List    Diagnosis Date Noted    Closed intertrochanteric fracture of hip, left, initial encounter 12/15/2019    Acute on chronic renal insufficiency 12/15/2019    Closed displaced intertrochanteric fracture of left femur 12/15/2019    Debility 04/09/2019    TIA (transient ischemic attack) 04/08/2019    Chronic idiopathic thrombocytopenia 11/09/2018    Essential hypertension 10/13/2014    Benign prostatic hyperplasia 10/13/2014    GERD (gastroesophageal reflux disease) 10/13/2014    Spinal stenosis 10/13/2014    Type 2 diabetes mellitus, without long-term current use of insulin 05/28/2013    Persistent atrial fibrillation 11/27/2012    Aortic stenosis 11/27/2012    Parkinson disease 11/27/2012            OPERATIVE INDICATIONS: Silvano Parmar is a 89 year old male who fell and injured their left  hip. They were evaluated in emergency room. X-rays were taken demonstrating an acute fracture. The patient was subsequently admitted. The patient sustained an intertrochanteric fracture of the left hip. The risks, benefits, alternatives, and reasonable outcomes were discussed with the patient. Specifically, we discussed refractory pain, weakness, non-union, mal-union, AVN, infection, damage to nerve and blood vessels. All questions were answered. The patient elects to proceed with the procedure and consent was signed.     OPERATIVE PROCEDURE: The patient was correctly identified in the preoperative holding area. The risks, benefits, reasonable expectations and outcomes were discussed with the patient and family. Verbal and written consent were obtained prior to surgery. The correct lower extremity was marked. The patient was then taken to the operating room on the hospital bed in supine position. Appropriate sign in was done. The patient was correctly identified and the hip had been marked preoperatively. The patient was administered a General anesthetic in the operating room, and then placed on the fracture table. An appropriate timeout was done followed by a closed reduction with fluoroscopy. The hip was then sterilely prepped and draped free in the usual fashion. Superior incision was made approximately 3 cm in length just above the level of the greater trochanter. This was carried through skin and subcutaneous tissues. The fascia was split using blunt dissection. A guide pin was placed on the tip of the greater trochanter. Position confirmed in AP and lateral planes. This was driven down to the level of the lesser trochanter. The entrance reamer was used to open the greater trochanter and proximal femur. This was removed along with the guide pin. A 11 millimeter short nail was then passed through the greater troch and past the fracture. This was done under C-arm visualization. Using the guide attachment, a  second incision was made on the lateral aspect of the femur and a guide pin was passed through the lateral cortex of the femur up into the femoral head. Again position confirmed in AP and lateral planes. This was measured and a 115mm blade screw was selected and seated under fluoroscopic guidance. This was locked proximally and then released a quarter turn to allow for compression. Compression was placed across the fracture site and the fracture was noted to reduce once again using C-arm visualization. The entrance apparatus and alignment guide were then used to place a distal locking screw. Final images were then taken and saved. All incisions were copiously irrigated with normal saline. The fascia was closed with 0 Vicryl suture and the subcutaneous tissue with close with 2-0 Vicryl. The skin was finally closed using glue. Aquacel dressings were then applied over the incisions. The patient was then awoken by the anesthesia department and transferred back to their hospital bed. The patient was then taken to the recovery room in stable condition. The patient tolerated the procedure well. Sponge count was correct.     POST OPERATIVE PLAN: Patient will be transferred to the floor once in stable condition and after radiographs confirm no immediate complications. The patient will be treated with multimodal pain management protocols. They will be placed on anticoagulation of Lovenox 40mg to start on POD1 and will be for 3 weeks duration. The patient will be weight bearing as tolerated. They will work with physical therapy, have a graduated diet, and once medially stable and safe for [facility] can be discharged. Patient will follow up with me 3 weeks post operatively. Dressing should be removed by patient 7 days post operatively.

## 2019-12-16 NOTE — ANESTHESIA PREPROCEDURE EVALUATION
12/16/2019  Silvano Pamrar is a 89 y.o., male.    Anesthesia Evaluation    I have reviewed the Patient Summary Reports.    I have reviewed the Nursing Notes.   I have reviewed the Medications.     Review of Systems  Anesthesia Hx:  No problems with previous Anesthesia    Social:  Non-Smoker    EENT/Dental:EENT/Dental Normal   Cardiovascular:   Hypertension, well controlled Dysrhythmias atrial fibrillation    Pulmonary:  Pulmonary Normal    Renal/:   Chronic Renal Disease    Hepatic/GI:   GERD    Neurological:   TIA,    Endocrine:   Diabetes, well controlled        Physical Exam  General:  Obesity    Airway/Jaw/Neck:  Airway Findings: Mouth Opening: Normal Tongue: Normal  General Airway Assessment: Adult  Mallampati: II  TM Distance: Normal, at least 6 cm  Jaw/Neck Findings:     Neck ROM: Normal ROM      Dental:  Dental Findings: Edentulous             Anesthesia Plan  Type of Anesthesia, risks & benefits discussed:  Anesthesia Type:  general  Patient's Preference:   Intra-op Monitoring Plan:   Intra-op Monitoring Plan Comments:   Post Op Pain Control Plan:   Post Op Pain Control Plan Comments:   Induction:   IV  Beta Blocker:         Informed Consent: Patient understands risks and agrees with Anesthesia plan.  Questions answered. Anesthesia consent signed with patient.  ASA Score: 3  emergent   Day of Surgery Review of History & Physical:    H&P update referred to the surgeon.         Ready For Surgery From Anesthesia Perspective.

## 2019-12-16 NOTE — ED NOTES
Received report from Krys JAMISON, assumed care of pt at this time. Pt resting in bed calmly RR easy non labored, NAD. Family at bedside. Updated on current plan of care. VSS, at baseline mental status per family member. 35mm Condom cath with urinary leg bag to obtain urine specimen. Pt tolerated well. Pt admitted to hospital for fall at nursing home approx 2 days ago. Positive left lower extremity femur fracture, swelling with external rotation noted. PMS intact. Side rails up x2, call light within reach, bed in lowest locked position, will continue to monitor and assess for changes.

## 2019-12-16 NOTE — PLAN OF CARE
Attempted to complete initial assessment three times, pt off unit.      CM will revisit pt room for initial case management assessment.

## 2019-12-16 NOTE — ANESTHESIA PROCEDURE NOTES
Left fascia iliaca    Patient location during procedure: holding area   Block not for primary anesthetic.  Reason for block: at surgeon's request and post-op pain management   Post-op Pain Location: Left hip   Timeout: 12/16/2019 3:15 PM   End time: 12/16/2019 3:30 PM    Staffing  Authorizing Provider: Tucker Mccoy MD  Performing Provider: Tucker Mccoy MD    Preanesthetic Checklist  Completed: patient identified, site marked, surgical consent, pre-op evaluation, timeout performed, IV checked, risks and benefits discussed and monitors and equipment checked  Peripheral Block  Patient position: supine  Prep: ChloraPrep and site prepped and draped  Patient monitoring: heart rate and continuous pulse ox  Block type: fascia iliaca  Laterality: left  Injection technique: single shot  Needle  Needle type: Echogenic   Needle gauge: 21 G  Needle length: 4 in  Needle localization: anatomical landmarks and ultrasound guidance   -ultrasound image captured on disc.  Assessment  Injection assessment: negative aspiration, negative parasthesia and local visualized surrounding nerve  Paresthesia pain: none  Heart rate change: no  Slow fractionated injection: yes  Additional Notes  Block injection was 50 cc 0.2% Ropivacaine

## 2019-12-16 NOTE — H&P
[facility] Hospital Medicine  History & Physical    Patient Name: Silvano Parmar  MRN: [id number]  Admission Date: 12/15/2019  Attending Physician: Nahum Little MD   Primary Care Provider: Silvestre Ramirez MD         Patient information was obtained from patient, past medical records and ER records.     Subjective:     Principal Problem:Closed intertrochanteric fracture of hip, left, initial encounter    Chief Complaint:   Chief Complaint   Patient presents with    Hip Pain     Per [facility] pt transported from [facility] w/ reported fall from nursing staff x3 days ago on 12/13. + left lower extremity deformity. + increased falls reported.         HPI: Mr. Silvano Parmar is a 89 y.o. male, with PMH of A. Fib, s/p st. Sven Pacemaker insertion, thrombocytopenia, NIDDM-2, diabatic neuropathy, Parkinson's Disease, CKD-II, HTN, who presented to [facility] ED on 12/15/19 from [facility] after he fell three days TPA and had persistent left hip pain. He had a deformity of the LLE. He denied head trauma of LOC. A mobile x-ray completed at [facility] today showed a left hip fracture. The nursing staff reported he has been having increased falls lately. In the ED, imaging confirmed an acute left intertrochanteric fracture of the left femur. Labs additionally showed persistence of thrombocytopenia, as well as elevated BUN/Cr above baseline. He was admitted to inpatient status.      Past Medical History:   Diagnosis Date    *Atrial fibrillation     AI (aortic insufficiency)     mild    Atrial fibrillation     Diabetes mellitus     Diabetes mellitus, type 2     Hyperlipemia 11/27/2012    Hyperlipidemia     Hypertension     Memory loss     Parkinson disease     Spinal stenosis memory loss       Past Surgical History:   Procedure Laterality Date    CHOLECYSTECTOMY         Review of patient's allergies indicates:   Allergen Reactions    Demerol [meperidine]        No  current facility-administered medications on file prior to encounter.      Current Outpatient Medications on File Prior to Encounter   Medication Sig    acetaminophen (TYLENOL) 325 mg Cap Take by mouth.    aspirin (ECOTRIN) 81 MG EC tablet Take 1 tablet (81 mg total) by mouth once daily.    carbidopa-levodopa  mg (SINEMET)  mg per tablet Take 2 tablets by mouth 3 (three) times daily.     cholecalciferol, vitamin D3, (VITAMIN D3) 4,000 unit Cap Take 1 capsule by mouth once daily.     diltiaZEM (CARDIZEM CD) 120 MG Cp24 TAKE ONE CAPSULE BY MOUTH ONCE DAILY    esomeprazole (NEXIUM) 40 MG capsule Take 22.3 mg by mouth before breakfast.     fluticasone (FLONASE) 50 mcg/actuation nasal spray 1 spray (50 mcg total) by Each Nare route once daily.    FOLIC ACID/MULTIVITS-MIN/LUT (CENTRUM SILVER ORAL) Take 1 tablet by mouth once daily.    furosemide (LASIX) 20 MG tablet TAKE 1 TABLET BY MOUTH EVERY DAY    gabapentin (NEURONTIN) 300 MG capsule TAKE ONE CAPSULE BY MOUTH TWICE DAILY    GENERLAC 10 gram/15 mL solution Take 10 g by mouth daily as needed.     guaifenesin 100 mg/5 ml (ROBITUSSIN) 100 mg/5 mL syrup Take 200 mg by mouth every 4 (four) hours as needed for Cough.    JANUVIA 50 mg Tab TAKE ONE TABLET BY MOUTH ONCE DAILY    losartan (COZAAR) 50 MG tablet TAKE ONE TABLET BY MOUTH ONCE DAILY    meloxicam (MOBIC) 15 MG tablet Take 15 mg by mouth daily as needed for Pain.    mirabegron (MYRBETRIQ) 50 mg Tb24 Take 1 tablet (50 mg total) by mouth once daily.    pramipexole (MIRAPEX) 1 MG tablet Take 1 tablet by mouth 3 (three) times daily.    tamsulosin (FLOMAX) 0.4 mg Cap Take 1 capsule (0.4 mg total) by mouth once daily.     Family History     None        Tobacco Use    Smoking status: Never Smoker    Smokeless tobacco: Never Used   Substance and Sexual Activity    Alcohol use: No    Drug use: No    Sexual activity: Never     Review of Systems   Unable to perform ROS: Dementia     Objective:      Vital Signs (Most Recent):  Temp: 98 °F (36.7 °C) (12/16/19 0352)  Pulse: 91 (12/16/19 0352)  Resp: 20 (12/16/19 0352)  BP: 136/78 (12/16/19 0352)  SpO2: 96 % (12/16/19 0352) Vital Signs (24h Range):  Temp:  [97.5 °F (36.4 °C)-98 °F (36.7 °C)] 98 °F (36.7 °C)  Pulse:  [] 91  Resp:  [16-25] 20  SpO2:  [91 %-100 %] 96 %  BP: (100-136)/(55-78) 136/78     Weight: 95.3 kg (210 lb 1.6 oz)  Body mass index is 28.49 kg/m².    Physical Exam   Constitutional: Vital signs are normal. He appears well-developed and well-nourished.  Non-toxic appearance. He does not have a sickly appearance. He does not appear ill. No distress.   HENT:   Head: Normocephalic and atraumatic.   Right Ear: External ear normal.   Left Ear: External ear normal.   Eyes: Pupils are equal, round, and reactive to light. Conjunctivae and EOM are normal. No scleral icterus.   Neck: Normal range of motion. Neck supple. No JVD present. No tracheal deviation present.   Cardiovascular: Normal rate, regular rhythm, normal heart sounds and intact distal pulses. Exam reveals no gallop and no friction rub.   No murmur heard.  Pulmonary/Chest: Effort normal and breath sounds normal. No stridor. No respiratory distress. He has no wheezes. He has no rales.   Abdominal: Soft. Bowel sounds are normal. He exhibits no distension and no mass. There is no tenderness. There is no guarding.   Musculoskeletal: Normal range of motion. He exhibits tenderness (left prox. femur & over greater trochanter. ). He exhibits no edema or deformity.   Neurological: He is alert. No cranial nerve deficit. He exhibits normal muscle tone. Coordination normal.   Muttering nonsense. Occasionally responds to questions, but not with logical answers.    Skin: Skin is warm and dry. He is not diaphoretic.   Psychiatric: He has a normal mood and affect. His behavior is normal. Judgment and thought content normal.   Nursing note and vitals reviewed.        CRANIAL NERVES     CN III, IV, VI    Pupils are equal, round, and reactive to light.  Extraocular motions are normal.        Significant Labs:   BMP:   Recent Labs   Lab 12/15/19  1726   *      K 3.9      CO2 23   BUN 41*   CREATININE 2.9*   CALCIUM 9.2     CBC:   Recent Labs   Lab 12/15/19  1726   WBC 11.68   HGB 12.8*   HCT 38.5*   *     CMP:   Recent Labs   Lab 12/15/19  1726      K 3.9      CO2 23   *   BUN 41*   CREATININE 2.9*   CALCIUM 9.2   PROT 6.5   ALBUMIN 3.4*   BILITOT 2.0*   ALKPHOS 90   AST 25   ALT 7*   ANIONGAP 13   EGFRNONAA 18*     Urine Culture: No results for input(s): LABURIN in the last 48 hours.  Urine Studies: No results for input(s): COLORU, APPEARANCEUA, PHUR, SPECGRAV, PROTEINUA, GLUCUA, KETONESU, BILIRUBINUA, OCCULTUA, NITRITE, UROBILINOGEN, LEUKOCYTESUR, RBCUA, WBCUA, BACTERIA, SQUAMEPITHEL, HYALINECASTS in the last 48 hours.    Invalid input(s): WRIGHTSUR  All pertinent labs within the past 24 hours have been reviewed.    Significant Imaging: I have reviewed all pertinent imaging results/findings within the past 24 hours.   Imaging Results           X-Ray Hip 2 View Left (Final result)  Result time 12/15/19 18:44:28    Final result by Axel Mcgee MD (12/15/19 18:44:28)                 Impression:      Acute intertrochanteric fracture of the proximal left femur.    This report was flagged in Epic as abnormal.      Electronically signed by: Axel Mcgee  Date:    12/15/2019  Time:    18:44             Narrative:    EXAMINATION:  XR HIP 2 VIEW LEFT    CLINICAL HISTORY:  Injury, unspecified, initial encounter    TECHNIQUE:  AP view of the pelvis and frog leg lateral view of the left hip were performed.    COMPARISON:  November 11, 2019    FINDINGS:  Radiographic examination of the left hip was performed, 2 radiographs are submitted.  There is acute fracture deformity involving the proximal left femur, consistent with inter trochanteric fracture with evidence for  distraction and displacement of fracture components including mild medial distraction/displacement of the lesser trochanter.  There is mild angulation at the fracture site.  There is no evidence for acetabulofemoral dislocation.  The right hip joint appears intact without evidence for dislocation, the osseous structures otherwise demonstrate chronic change without additional evidence for acute fracture deformity.  Vascular calcifications and suspected phleboliths are noted.                                 Assessment/Plan:     * Closed intertrochanteric fracture of hip, left, initial encounter  - Mr. Silvano Parmar is admitted to inpatient status   - he has been having increased mechanical falls letely  - he fell on 12/13/19 and since has been unable to ambulate  - mobile x-ray at [facility] today indicated left intertrochanteric fracture   - x-ray in ED confirmed this   - Ortho consulted, surgery tomorrow   - NPO after MN  - PRN pain meds   - fall precautions  - PT/OT after surgery complete       Acute on chronic renal insufficiency  - baseline Cr 1.1  - today Cr 2.9   - ENZO studies sent   - IV fluids overnight  - avoid nephrotoxins, renally dose meds  - monitor     Chronic idiopathic thrombocytopenia  - noted on today's labs   - monitor     Essential hypertension  - currently mildly hypotensive, with MAP 71   - hold BP meds tonight   - monitor      Type 2 diabetes mellitus, without long-term current use of insulin  - last A1C:   Lab Results   Component Value Date    HGBA1C 5.9 (H) 04/08/2019   - hold oral antidiabetic meds   - Diabetic diet   - SSI with accuchekcs AC/HS        Persistent atrial fibrillation  - continue home meds: diltiazem   - hold ASA   - monitor on tele   - has [employer] Pacemaker   - follows w/ Cardiology, Dr. Juan       VTE Risk Mitigation (From admission, onward)         Ordered     IP VTE HIGH RISK PATIENT  Once      12/15/19 2212     Place sequential compression device  Until  discontinued      12/15/19 2218     Reason for No Pharmacological VTE Prophylaxis  Once     Question:  Reasons:  Answer:  Physician Provided (leave comment)    12/15/19 2218                   Eva Bruno PA-C  Department of Hospital Medicine   [facility]

## 2019-12-16 NOTE — SUBJECTIVE & OBJECTIVE
Past Medical History:   Diagnosis Date    *Atrial fibrillation     AI (aortic insufficiency)     mild    Atrial fibrillation     Diabetes mellitus     Diabetes mellitus, type 2     Hyperlipemia 11/27/2012    Hyperlipidemia     Hypertension     Memory loss     Parkinson disease     Spinal stenosis memory loss       Past Surgical History:   Procedure Laterality Date    CHOLECYSTECTOMY         Review of patient's allergies indicates:   Allergen Reactions    Demerol [meperidine]        No current facility-administered medications on file prior to encounter.      Current Outpatient Medications on File Prior to Encounter   Medication Sig    acetaminophen (TYLENOL) 325 mg Cap Take by mouth.    aspirin (ECOTRIN) 81 MG EC tablet Take 1 tablet (81 mg total) by mouth once daily.    carbidopa-levodopa  mg (SINEMET)  mg per tablet Take 2 tablets by mouth 3 (three) times daily.     cholecalciferol, vitamin D3, (VITAMIN D3) 4,000 unit Cap Take 1 capsule by mouth once daily.     diltiaZEM (CARDIZEM CD) 120 MG Cp24 TAKE ONE CAPSULE BY MOUTH ONCE DAILY    esomeprazole (NEXIUM) 40 MG capsule Take 22.3 mg by mouth before breakfast.     fluticasone (FLONASE) 50 mcg/actuation nasal spray 1 spray (50 mcg total) by Each Nare route once daily.    FOLIC ACID/MULTIVITS-MIN/LUT (CENTRUM SILVER ORAL) Take 1 tablet by mouth once daily.    furosemide (LASIX) 20 MG tablet TAKE 1 TABLET BY MOUTH EVERY DAY    gabapentin (NEURONTIN) 300 MG capsule TAKE ONE CAPSULE BY MOUTH TWICE DAILY    GENERLAC 10 gram/15 mL solution Take 10 g by mouth daily as needed.     guaifenesin 100 mg/5 ml (ROBITUSSIN) 100 mg/5 mL syrup Take 200 mg by mouth every 4 (four) hours as needed for Cough.    JANUVIA 50 mg Tab TAKE ONE TABLET BY MOUTH ONCE DAILY    losartan (COZAAR) 50 MG tablet TAKE ONE TABLET BY MOUTH ONCE DAILY    meloxicam (MOBIC) 15 MG tablet Take 15 mg by mouth daily as needed for Pain.    mirabegron (MYRBETRIQ) 50 mg  Tb24 Take 1 tablet (50 mg total) by mouth once daily.    pramipexole (MIRAPEX) 1 MG tablet Take 1 tablet by mouth 3 (three) times daily.    tamsulosin (FLOMAX) 0.4 mg Cap Take 1 capsule (0.4 mg total) by mouth once daily.     Family History     None        Tobacco Use    Smoking status: Never Smoker    Smokeless tobacco: Never Used   Substance and Sexual Activity    Alcohol use: No    Drug use: No    Sexual activity: Never     Review of Systems   Unable to perform ROS: Dementia     Objective:     Vital Signs (Most Recent):  Temp: 98 °F (36.7 °C) (12/16/19 0352)  Pulse: 91 (12/16/19 0352)  Resp: 20 (12/16/19 0352)  BP: 136/78 (12/16/19 0352)  SpO2: 96 % (12/16/19 0352) Vital Signs (24h Range):  Temp:  [97.5 °F (36.4 °C)-98 °F (36.7 °C)] 98 °F (36.7 °C)  Pulse:  [] 91  Resp:  [16-25] 20  SpO2:  [91 %-100 %] 96 %  BP: (100-136)/(55-78) 136/78     Weight: 95.3 kg (210 lb 1.6 oz)  Body mass index is 28.49 kg/m².    Physical Exam   Constitutional: Vital signs are normal. He appears well-developed and well-nourished.  Non-toxic appearance. He does not have a sickly appearance. He does not appear ill. No distress.   HENT:   Head: Normocephalic and atraumatic.   Right Ear: External ear normal.   Left Ear: External ear normal.   Eyes: Pupils are equal, round, and reactive to light. Conjunctivae and EOM are normal. No scleral icterus.   Neck: Normal range of motion. Neck supple. No JVD present. No tracheal deviation present.   Cardiovascular: Normal rate, regular rhythm, normal heart sounds and intact distal pulses. Exam reveals no gallop and no friction rub.   No murmur heard.  Pulmonary/Chest: Effort normal and breath sounds normal. No stridor. No respiratory distress. He has no wheezes. He has no rales.   Abdominal: Soft. Bowel sounds are normal. He exhibits no distension and no mass. There is no tenderness. There is no guarding.   Musculoskeletal: Normal range of motion. He exhibits tenderness (left prox.  femur & over greater trochanter. ). He exhibits no edema or deformity.   Neurological: He is alert. No cranial nerve deficit. He exhibits normal muscle tone. Coordination normal.   Muttering nonsense. Occasionally responds to questions, but not with logical answers.    Skin: Skin is warm and dry. He is not diaphoretic.   Psychiatric: He has a normal mood and affect. His behavior is normal. Judgment and thought content normal.   Nursing note and vitals reviewed.        CRANIAL NERVES     CN III, IV, VI   Pupils are equal, round, and reactive to light.  Extraocular motions are normal.        Significant Labs:   BMP:   Recent Labs   Lab 12/15/19  1726   *      K 3.9      CO2 23   BUN 41*   CREATININE 2.9*   CALCIUM 9.2     CBC:   Recent Labs   Lab 12/15/19  1726   WBC 11.68   HGB 12.8*   HCT 38.5*   *     CMP:   Recent Labs   Lab 12/15/19  1726      K 3.9      CO2 23   *   BUN 41*   CREATININE 2.9*   CALCIUM 9.2   PROT 6.5   ALBUMIN 3.4*   BILITOT 2.0*   ALKPHOS 90   AST 25   ALT 7*   ANIONGAP 13   EGFRNONAA 18*     Urine Culture: No results for input(s): LABURIN in the last 48 hours.  Urine Studies: No results for input(s): COLORU, APPEARANCEUA, PHUR, SPECGRAV, PROTEINUA, GLUCUA, KETONESU, BILIRUBINUA, OCCULTUA, NITRITE, UROBILINOGEN, LEUKOCYTESUR, RBCUA, WBCUA, BACTERIA, SQUAMEPITHEL, HYALINECASTS in the last 48 hours.    Invalid input(s): WRIGHTSUR  All pertinent labs within the past 24 hours have been reviewed.    Significant Imaging: I have reviewed all pertinent imaging results/findings within the past 24 hours.   Imaging Results           X-Ray Hip 2 View Left (Final result)  Result time 12/15/19 18:44:28    Final result by Axel Mcgee MD (12/15/19 18:44:28)                 Impression:      Acute intertrochanteric fracture of the proximal left femur.    This report was flagged in Epic as abnormal.      Electronically signed by: Axel  Everardo  Date:    12/15/2019  Time:    18:44             Narrative:    EXAMINATION:  XR HIP 2 VIEW LEFT    CLINICAL HISTORY:  Injury, unspecified, initial encounter    TECHNIQUE:  AP view of the pelvis and frog leg lateral view of the left hip were performed.    COMPARISON:  November 11, 2019    FINDINGS:  Radiographic examination of the left hip was performed, 2 radiographs are submitted.  There is acute fracture deformity involving the proximal left femur, consistent with inter trochanteric fracture with evidence for distraction and displacement of fracture components including mild medial distraction/displacement of the lesser trochanter.  There is mild angulation at the fracture site.  There is no evidence for acetabulofemoral dislocation.  The right hip joint appears intact without evidence for dislocation, the osseous structures otherwise demonstrate chronic change without additional evidence for acute fracture deformity.  Vascular calcifications and suspected phleboliths are noted.

## 2019-12-16 NOTE — ASSESSMENT & PLAN NOTE
- baseline Cr 1.1  - today Cr 2.9   - ENZO studies sent   - IV fluids overnight  - avoid nephrotoxins, renally dose meds  - monitor

## 2019-12-16 NOTE — ASSESSMENT & PLAN NOTE
- Mr. Silvano Parmar is admitted to inpatient status   - he has been having increased mechanical falls letely  - he fell on 12/13/19 and since has been unable to ambulate  - mobile x-ray at [facility] today indicated left intertrochanteric fracture   - x-ray in ED confirmed this   - Ortho consulted, surgery tomorrow   - NPO after MN  - PRN pain meds   - fall precautions  - PT/OT after surgery complete

## 2019-12-16 NOTE — NURSING
Pt arrived to floor. Son at bedside. Fluids running. Oriented pt and son to room/unit. VSS on RA. SCDs on. Nonskid socks on. Bed low, locked, with bed alarm on. Side rails up x3. Call light within reach. Denies pain. Resting in bed. Will continue to monitor.

## 2019-12-17 LAB
ANION GAP SERPL CALC-SCNC: 8 MMOL/L (ref 8–16)
BUN SERPL-MCNC: 36 MG/DL (ref 8–23)
CALCIUM SERPL-MCNC: 8.7 MG/DL (ref 8.7–10.5)
CHLORIDE SERPL-SCNC: 107 MMOL/L (ref 95–110)
CO2 SERPL-SCNC: 24 MMOL/L (ref 23–29)
CREAT SERPL-MCNC: 1.3 MG/DL (ref 0.5–1.4)
ERYTHROCYTE [DISTWIDTH] IN BLOOD BY AUTOMATED COUNT: 14.2 % (ref 11.5–14.5)
EST. GFR  (AFRICAN AMERICAN): 56 ML/MIN/1.73 M^2
EST. GFR  (NON AFRICAN AMERICAN): 48 ML/MIN/1.73 M^2
GLUCOSE SERPL-MCNC: 80 MG/DL (ref 70–110)
HCT VFR BLD AUTO: 34.2 % (ref 40–54)
HGB BLD-MCNC: 10.9 G/DL (ref 14–18)
MCH RBC QN AUTO: 32.4 PG (ref 27–31)
MCHC RBC AUTO-ENTMCNC: 31.9 G/DL (ref 32–36)
MCV RBC AUTO: 102 FL (ref 82–98)
PLATELET # BLD AUTO: 110 K/UL (ref 150–350)
PMV BLD AUTO: 10.6 FL (ref 9.2–12.9)
POCT GLUCOSE: 110 MG/DL (ref 70–110)
POCT GLUCOSE: 134 MG/DL (ref 70–110)
POCT GLUCOSE: 149 MG/DL (ref 70–110)
POCT GLUCOSE: 61 MG/DL (ref 70–110)
POCT GLUCOSE: 86 MG/DL (ref 70–110)
POTASSIUM SERPL-SCNC: 4 MMOL/L (ref 3.5–5.1)
RBC # BLD AUTO: 3.36 M/UL (ref 4.6–6.2)
SODIUM SERPL-SCNC: 139 MMOL/L (ref 136–145)
WBC # BLD AUTO: 7.81 K/UL (ref 3.9–12.7)

## 2019-12-17 PROCEDURE — 97530 THERAPEUTIC ACTIVITIES: CPT

## 2019-12-17 PROCEDURE — 36415 COLL VENOUS BLD VENIPUNCTURE: CPT

## 2019-12-17 PROCEDURE — 97165 OT EVAL LOW COMPLEX 30 MIN: CPT

## 2019-12-17 PROCEDURE — 85027 COMPLETE CBC AUTOMATED: CPT

## 2019-12-17 PROCEDURE — 25000003 PHARM REV CODE 250: Performed by: ORTHOPAEDIC SURGERY

## 2019-12-17 PROCEDURE — 80048 BASIC METABOLIC PNL TOTAL CA: CPT

## 2019-12-17 PROCEDURE — 94761 N-INVAS EAR/PLS OXIMETRY MLT: CPT

## 2019-12-17 PROCEDURE — 97162 PT EVAL MOD COMPLEX 30 MIN: CPT

## 2019-12-17 PROCEDURE — 11000001 HC ACUTE MED/SURG PRIVATE ROOM

## 2019-12-17 PROCEDURE — 25000003 PHARM REV CODE 250: Performed by: PHYSICIAN ASSISTANT

## 2019-12-17 PROCEDURE — 99232 PR SUBSEQUENT HOSPITAL CARE,LEVL II: ICD-10-PCS | Mod: ,,, | Performed by: INTERNAL MEDICINE

## 2019-12-17 PROCEDURE — 99232 SBSQ HOSP IP/OBS MODERATE 35: CPT | Mod: ,,, | Performed by: INTERNAL MEDICINE

## 2019-12-17 PROCEDURE — 63600175 PHARM REV CODE 636 W HCPCS: Performed by: ORTHOPAEDIC SURGERY

## 2019-12-17 RX ORDER — NAPROXEN SODIUM 220 MG/1
81 TABLET, FILM COATED ORAL 2 TIMES DAILY
Status: DISCONTINUED | OUTPATIENT
Start: 2019-12-17 | End: 2019-12-18 | Stop reason: HOSPADM

## 2019-12-17 RX ORDER — AMOXICILLIN 250 MG
1 CAPSULE ORAL 2 TIMES DAILY PRN
Start: 2019-12-17 | End: 2020-03-25 | Stop reason: HOSPADM

## 2019-12-17 RX ORDER — NAPROXEN SODIUM 220 MG/1
81 TABLET, FILM COATED ORAL 2 TIMES DAILY
Refills: 0
Start: 2019-12-17 | End: 2020-03-25 | Stop reason: HOSPADM

## 2019-12-17 RX ADMIN — ACETAMINOPHEN 650 MG: 325 TABLET ORAL at 05:12

## 2019-12-17 RX ADMIN — GABAPENTIN 300 MG: 300 CAPSULE ORAL at 09:12

## 2019-12-17 RX ADMIN — PRAMIPEXOLE DIHYDROCHLORIDE 1 MG: 0.5 TABLET ORAL at 02:12

## 2019-12-17 RX ADMIN — PRAMIPEXOLE DIHYDROCHLORIDE 1 MG: 0.5 TABLET ORAL at 09:12

## 2019-12-17 RX ADMIN — Medication 16 G: at 08:12

## 2019-12-17 RX ADMIN — LOSARTAN POTASSIUM 50 MG: 50 TABLET, FILM COATED ORAL at 09:12

## 2019-12-17 RX ADMIN — DOCUSATE SODIUM AND SENNOSIDES 1 TABLET: 50; 8.6 TABLET, FILM COATED ORAL at 09:12

## 2019-12-17 RX ADMIN — CEFAZOLIN SODIUM 2 G: 2 SOLUTION INTRAVENOUS at 09:12

## 2019-12-17 RX ADMIN — TAMSULOSIN HYDROCHLORIDE 0.4 MG: 0.4 CAPSULE ORAL at 09:12

## 2019-12-17 RX ADMIN — CARBIDOPA AND LEVODOPA 2 TABLET: 25; 100 TABLET ORAL at 09:12

## 2019-12-17 RX ADMIN — POLYETHYLENE GLYCOL 3350 17 G: 17 POWDER, FOR SOLUTION ORAL at 09:12

## 2019-12-17 RX ADMIN — GABAPENTIN 300 MG: 300 CAPSULE ORAL at 11:12

## 2019-12-17 RX ADMIN — SODIUM CHLORIDE: 0.9 INJECTION, SOLUTION INTRAVENOUS at 01:12

## 2019-12-17 RX ADMIN — ASPIRIN 81 MG: 81 TABLET, COATED ORAL at 09:12

## 2019-12-17 RX ADMIN — CARBIDOPA AND LEVODOPA 2 TABLET: 25; 100 TABLET ORAL at 02:12

## 2019-12-17 RX ADMIN — PRAMIPEXOLE DIHYDROCHLORIDE 1 MG: 0.5 TABLET ORAL at 11:12

## 2019-12-17 RX ADMIN — CEFAZOLIN SODIUM 2 G: 2 SOLUTION INTRAVENOUS at 12:12

## 2019-12-17 RX ADMIN — DILTIAZEM HYDROCHLORIDE 120 MG: 120 CAPSULE, COATED, EXTENDED RELEASE ORAL at 09:12

## 2019-12-17 RX ADMIN — ACETAMINOPHEN 650 MG: 325 TABLET ORAL at 09:12

## 2019-12-17 NOTE — NURSING
Report received from PACU RN. Report given to night shift nurse Camilo. Pt. Room set up for return.

## 2019-12-17 NOTE — PT/OT/SLP EVAL
Occupational Therapy   Evaluation    Name: Silvano Parmar  MRN: [id number]  Admitting Diagnosis:  Closed intertrochanteric fracture of hip, left, initial encounter 1 Day Post-Op    Recommendations:     Discharge Recommendations: nursing facility, skilled  Discharge Equipment Recommendations:  (TBD at next level of care)  Barriers to discharge:  None    Assessment:     Silvano Parmar is a 89 y.o. male with a medical diagnosis of Closed intertrochanteric fracture of hip, left, initial encounter.  He presents with no pain at rest. Performance deficits affecting function: weakness, impaired functional mobilty, gait instability, impaired self care skills, impaired endurance.  Pt agreeable to participating in therapy upon arrival to room.  Pt demonstrates strength and ROM in (B) UE needed for ADLs that is WNL and WFL respectively.  While seated at EOB pt demonstrated some trembling of (B) UE and postural instability observed requiring CGA-Min A to maintain balance.  Pt required Mod A, RW, and assist of 2 people to perform sit <> stand transfer and step transfer from bed <> chair.  Increased time required for transfer with cues provided for sequencing of task.  PTA son reports pt required assist for ADLs and transfers.  He had a fall a month ago.  Prior to the fall pt was able to ambulate short distances (~30 ft) with Parkinson's walker.  Pt is not at PLOF and would benefit from skilled OT services to address problems listed above and increase independence with ADLs.  SNF is recommended upon d/c from acute care to further address deficits and help pt improve overall functional independence.        Rehab Prognosis: Good; patient would benefit from acute skilled OT services to address these deficits and reach maximum level of function.       Plan:     Patient to be seen 5 x/week to address the above listed problems via self-care/home management, therapeutic activities, therapeutic exercises  · Plan of Care Expires:  01/16/20  · Plan of Care Reviewed with: patient, son    Subjective     Chief Complaint: Pain in L hip with movement  Patient/Family Comments/goals: Resume PLOF    Occupational Profile:  Living Environment: Pt has been living in the [facility] for ~1.5 years.  Three days ago pt transitioned to [facility] ([facility] that is [facility] with [facility]).  Previous level of function: At [facility] pt required assist for bathing and dressing, and utilized shower chair.  He was able to feed himself.  He required assist for transfers, but was able to ambulate short distance with Parkinson's walker (~30 ft).  About a month ago he had a fall and at that time family hired caretakers to provide 24/7 assist 2* pt required increased assist.       Roles and Routines: Enjoys watching Western movies, father  Equipment Used at Home:  shower chair(Parkinson's walker)  Assistance upon Discharge: Family able to provide some assist    Pain/Comfort:  · Pain Rating 1: 0/10  · Pain Rating Post-Intervention 1: (some pain with movement in L hip)    Patients cultural, spiritual, Latter-day conflicts given the current situation: no    Objective:     Communicated with: RN (Mirtha) prior to session.  Patient found HOB elevated with SCD, FCD, telemetry, peripheral IV, oxygen (2L) upon OT entry to room.  Son present.    General Precautions: Standard, fall   Orthopedic Precautions:LLE weight bearing as tolerated, no hip precautions    Braces: N/A     Occupational Performance:    Bed Mobility:    · Patient completed Rolling/Turning to Right with minimum assistance; cues provided to use bed rail  · Patient completed Scooting/Bridging with total assistance towards EOB  · Patient completed Supine to Sit with maximal assistance for trunk and LE management    Functional Mobility/Transfers:  · Sit <> Stand:  Mod A, RW, and assist of 2 people.  OT and PT stood on either side of pt.  Cues provided for positioning of hands on RW and to push through (B) UE  to achieve full upright position.  Pt had difficulty sliding feet back under him to obtain proper positioning.  · Step transfer bed <> chair:  Mod A, RW, and assist of 2.  PT stood in front of pt while OT stood behind.  Cues required for sequencing of steps and RW management.  Increased time required; pt stood for ~3-4 minutes.  · Functional Mobility: Pt took a couple steps from bed <> chair with Mod A, RW, and assist of 2.  Difficulty weight shifting, sliding foot, and elevating foot from ground observed.  Impaired balance noted.  · Other:  Pt performed sit <> stand transfer when OT and PT returned to room later; Max A and RW x 1 trial from chair (with assist of 2), Mod A and RW x 1 trial from chair (with assist of 2).  Max A, RW, and assist of 2 required to perform step transfer from chair <> bed.  Consistent cues required for positioning of hands on RW, to push through with (B) UE, and for sequencing of steps.  Pt moved in shifting pattern and leaned back several times and flexed hips causing him to lean over RW with bottom pushed far behind.  Difficulty shifting weight and taking steps observed.     Activities of Daily Living:  · Upper Body Dressing: moderate assistance for donning gown on backside like robe while seated at EOB.  Pt able to place RUE through arm hole and pull up to shoulder.  He was able to slightly pull around backside.  Assist required for LUE and to fully pull around 2* line present on LUE.    Cognitive/Visual Perceptual:  Cognitive/Psychosocial Skills:    -       Oriented to: Person, Place and son.  Some confusion noted with pt saying things or asking questions not related to what was currently happening  -       Follows Commands/attention:Follows two-step commands  -       Communication: Answered all questions and engaged in conversation when spoken to, but difficult to distinguish speech   -       Memory: Deficits present  -       Safety awareness/insight to disability: Impaired  -        Mood/Affect/Coping skills/emotional control: Cooperative and Pleasant    Physical Exam:  Postural examination/scapula alignment:   -       Rounded shoulders  -       Forward head  Skin integrity: Dry; Moles and raised skin present on back   Edema:  Moderate in LLE; mild in RLE  Sensation: -       Intact  Motor Planning: -       Deficits present  Dominant hand: -       Right  Upper Extremity Range of Motion:    -       Right Upper Extremity: WNL  -       Left Upper Extremity: WNL  Upper Extremity Strength: Some trembling observed when holding UE up against gravity  -       Right Upper Extremity: WFL; grossly 4/5 all muscle groups  -       Left Upper Extremity: WFL; grossly 4/5 all muscle groups   Strength: 3/5 both hands  Fine Motor Coordination: -       Intact  Gross motor coordination:   Impaired; Pt with Parkinsons  Balance:  Sitting- CGA-Min A; Standing- Min A-Mod A    AMPAC 6 Click ADL:  AMPAC Total Score: 13    Treatment & Education:  *Pt and son educated on role of OT and POC discussed  *Pt performed sit <> stand transfer and step transfer from bed <> chair; see details above.  *POC reviewed with pt  Education:    Patient left up in chair with call button in reach and son present; SCD on RLE, FCD on LLE    GOALS:   Multidisciplinary Problems     Occupational Therapy Goals        Problem: Occupational Therapy Goal    Goal Priority Disciplines Outcome Interventions   Occupational Therapy Goal     OT, PT/OT Ongoing, Progressing    Description:  Goals to be met by: 12/24/2019     Patient will increase functional independence with ADLs by performing:    Feeding with Stand-by Assistance.  UE Dressing with Minimal Assistance.  Grooming while seated with Stand-by Assistance.  Toileting from bedside commode with Minimal Assistance for hygiene and clothing management.   Toilet transfer to bedside commode with Moderate Assistance.  Pt will increase postural stability and maintain balance sitting EOB for 5 minutes  with CGA.                      History:     Past Medical History:   Diagnosis Date    *Atrial fibrillation     AI (aortic insufficiency)     mild    Atrial fibrillation     Diabetes mellitus     Diabetes mellitus, type 2     Hyperlipemia 11/27/2012    Hyperlipidemia     Hypertension     Memory loss     Parkinson disease     Spinal stenosis memory loss       Past Surgical History:   Procedure Laterality Date    CHOLECYSTECTOMY      OPEN REDUCTION AND INTERNAL FIXATION (ORIF) OF INTERTROCHANTERIC FRACTURE OF FEMUR Left 12/16/2019    Procedure: ORIF, FRACTURE, FEMUR, INTERTROCHANTERIC;  Surgeon: Max Mcclellan MD;  Location: [facility];  Service: Orthopedics;  Laterality: Left;       Time Tracking:     OT Date of Treatment: 12/17/19  OT Start Time: 0925  OT Stop Time: 1001  OT Total Time (min): 56 min    *OT and PT returned to room from 13:25-13:45 to help pt transfer back to bed.    Billable Minutes:Evaluation 36  Therapeutic Activity 10   *Completed with PT 2* pt requires increased level of skilled assist    NELLY Mccoy  12/17/2019

## 2019-12-17 NOTE — PLAN OF CARE
Pt remains oriented only to self. Remains free from fall, injury, skin breakdown. VSS on 2L throughout the night. Positions self with assists x2. Pain controled w/ PO. Ice applied to the incision site. F/C draining katerina urine to gravity. All alarms active and auduble. TEDs/SCDs in place. Plan of care reviewed w/ pt and all questions answered. Bed locked and in lowest position. Call light w/I reach. No needs at this time. Purposeful hourly rounding. Will continue to monitor.

## 2019-12-17 NOTE — PLAN OF CARE
Problem: Occupational Therapy Goal  Goal: Occupational Therapy Goal  Description  Goals to be met by: 12/24/2019     Patient will increase functional independence with ADLs by performing:    Feeding with Stand-by Assistance.  UE Dressing with Minimal Assistance.  Grooming while seated with Stand-by Assistance.  Toileting from bedside commode with Minimal Assistance for hygiene and clothing management.   Toilet transfer to bedside commode with Moderate Assistance.  Pt will increase postural stability and maintain balance sitting EOB for 5 minutes with CGA.     12/17/2019 1005 by NELLY Johnston  Outcome: Ongoing, Progressing     OT evaluation complete and POC established.  SNF is recommended upon d/c from acute care to further address deficits and help pt improve overall functional independence.     NELLY Mccoy  12/17/2019

## 2019-12-17 NOTE — PT/OT/SLP EVAL
Physical Therapy Evaluation    Patient Name:  Silvano Parmar   MRN:  [id number]    Recommendations:     Discharge Recommendations:  nursing facility, skilled   Discharge Equipment Recommendations: other (see comments)(Defer to SNF)   Barriers to discharge: Current functional level    Assessment:     Silvano Parmar is a 89 y.o. male admitted with a medical diagnosis of Closed intertrochanteric fracture of hip, left, initial encounter.  He presents with the following impairments/functional limitations:  weakness, impaired endurance, impaired self care skills, impaired functional mobilty, gait instability, impaired balance, decreased lower extremity function, impaired cognition, decreased safety awareness, pain, edema, impaired cardiopulmonary response to activity, orthopedic precautions.    PT orders received. PT evaluation completed and goals/POC established. Pt tolerated evaluation well with no adverse reactions. Pt required mod to max A of 2 people for all mobility. Limited by L hip pain and tremors. Pt will benefit from skilled PT services to address impairments and functional limitations. Recommend discharge to SNF. Pt was previously able to perform some transfer and ambulate short distance with Parkinson's walker and stand by assistance. Pt now requiring significantly increased assistance. Pt would benefit from intensive multidisciplinary therapies after discharge to allow return to PLOF.    Rehab Prognosis: Good; patient would benefit from acute skilled PT services to address these deficits and reach maximum level of function.    Recent Surgery: Procedure(s) (LRB):  ORIF, FRACTURE, FEMUR, INTERTROCHANTERIC (Left) 1 Day Post-Op    Plan:     During this hospitalization, patient to be seen 6 x/week to address the identified rehab impairments via gait training, therapeutic activities, therapeutic exercises, neuromuscular re-education and progress toward the following goals:    · Plan of Care Expires:   "01/17/20    Subjective     Chief Complaint: L hip pain with movement and weight bearing.  Patient/Family Comments/goals: None stated.  Pain/Comfort:  · Pain Rating 1: 0/10(at rest)  · Location - Side 1: Left  · Location 1: hip  · Pain Rating Post-Intervention 1: other (see comments)("some" pain with movement and weight bearing)    Patients cultural, spiritual, Ep[employer]alian conflicts given the current situation: no    Living Environment:  Pt has been living at [facility] (assisted living) for the past year and half. Had a fall about a month ago and family hired 24/7 sitters to assist him. About 3 days ago, pt transferred over to [facility] for increased assistance with mobility and ADLs.  Prior to admission, patients level of function was needing assistance for ADLs (dressing, bathing, toileting) and requiring assistance for transfers. Pt was ambulating short distances with his Parkinson's walker prior to fall a month ago.  Equipment used at home: other (see comments), shower chair(Parkinson's walker).  DME owned (not currently used): none.  Upon discharge, patient will have assistance from sitters vs the staff at his nursing facility.    Objective:     Communicated with RN prior to session.  Patient found supine with peripheral IV, telemetry  upon PT entry to room.    General Precautions: Standard, fall   Orthopedic Precautions:LLE weight bearing as tolerated   Braces: N/A     Exams:  · Cognition:   · Patient is oriented to person.  · Pt follows approximately 100% of two step commands.    · Mood: Pleasant and cooperative.  · Musculoskeletal:  · Posture:  Forward head, rounded shoulders  · LE ROM/Strength:   · R ROM: WNL  · L ROM: WNL at knee and ankle. Minimally limited by pain at hip.  · R Strength:   · Hip flexion: 5/5  · Knee extension: 5/5  · Dorsiflexion: 5/5   · L Strength:   · Hip flexion: 3-/5  · Knee extension: 3-/5  · Dorsiflexion: 5/5   · Neuromuscular:  · Sensation: Intact to light touch " bilateral LEs.   · Tone/Reflexes: No impairments identified with functional mobility. No formal testing performed.   · Coordination: Slightly impaired toe tapping bilaterally.  · Balance:   · Static sitting: CGA to mod A  · Dynamic sitting: Mod A  · Static standing: Mod A x 2 people with rolling walker for UE support.  · Dynamic standing: Mod A x 2 people with rolling walker for UE support.  · Visual-vestibular: No impairments identified with functional mobility. No formal testing performed.  · Integument: Visible skin intact  · Cardiopulmonary:  · Edema: Moderate edema in BLE    Functional Mobility:  · Bed Mobility:     · Supine to Sit: maximal assistance x 2 people  · Sit to Supine: maximal assistance x 2 people  · Transfers:     · Sit to Stand:  moderate to maximal assistance x 2 people with rolling walker  · Mod A x 2 people when standing from EOB during 1st. Performed 2x.  · Max A x 2 people when standing from recliner during 2nd session. Performed 2x  · Bed to Chair: moderate assistance x 2 people with  rolling walker  using  Step Transfer  · Required extended time to complete  · Chair to Bed: maximal assistance x 2 people with rolling walker using step transfer.  · Required extended time to complete.  · Required constant verbal cues for elbow and knee extension during transfer.  · Gait: Pt able to take 4 small steps during transfer to chair with mod A x 2 people and rolling walker. Pt able to take additional 4 small steps during transfer back to bed, but required max A x 2 people.  · Hesitant to weight bear on LLE with difficulty clearing R foot.  · Required constant verbal cues for elbow and knee extension during ambulation.  · Balance: Sat EOB x 10 min with CGA to mod A  · CGA with static sitting  · Mod A with dynamic sitting and when moving arms/legs.      Therapeutic Activities and Exercises:  0922 - 0958: Pt semi-supine in bed upon entering room and agreeable to treatment session. Required max A x 2  people for supine <> sit. Sat EOB x 10 min. Fluctuating level of assistance needed in sitting. Able to maintain sitting with CGA at times. Required mod A for balance when moving arms and/or legs in sitting. Noted resting tremors in BUE. Pt required mod A x 2 people for sit <> stand and mod A for transfer to recliner with rolling walker. Required extended time to complete transfer. Pt able to take ~4 small steps during transfer. Pt left sitting up in recliner at end of session with all lines intact, call light within reach, and son present.    1328 - 1348: Pt sitting up in recliner and reports he is ready to return to bed. Required max A x 2 people for sit <> stand from recliner. 1st attempt, pt unable to take step for transfer. Returned to sitting after standing for ~30 sec. 2nd attempt pt stood with max A x 2 people and transferred back to bed with max A x 2 people. Required constant verbal cues for elbow and knee extension during transfer. Significant difficulty clearing feet due to reluctance to bear weight. Pt required max A x 2 people for sit > supine. Pt left supine in bed with all needs met, call light within reach, all lines intact, and bed alarm armed.    Overlap with OT for portions of session due to complex nature of patient and for safety with mobility to decrease fall risk for patient and caregiver injury requiring two skilled therapists to provide interventions.     AM-PAC 6 CLICK MOBILITY  Total Score:10     GOALS:   Multidisciplinary Problems     Physical Therapy Goals        Problem: Physical Therapy Goal    Goal Priority Disciplines Outcome Goal Variances Interventions   Physical Therapy Goal     PT, PT/OT Ongoing, Progressing     Description:  Goals to be met by: 1/17/20    Patient will perform the following to increase strength, improve mobility, and return to prior level of function:    1. Supine <> sit with min A.  2. Sit<>stand with min A with least restrictive assistive device.  3. Gait x 30  feet with min A with least restrictive assistive device.                    History:     Past Medical History:   Diagnosis Date    *Atrial fibrillation     AI (aortic insufficiency)     mild    Atrial fibrillation     Diabetes mellitus     Diabetes mellitus, type 2     Hyperlipemia 11/27/2012    Hyperlipidemia     Hypertension     Memory loss     Parkinson disease     Spinal stenosis memory loss       Past Surgical History:   Procedure Laterality Date    CHOLECYSTECTOMY      OPEN REDUCTION AND INTERNAL FIXATION (ORIF) OF INTERTROCHANTERIC FRACTURE OF FEMUR Left 12/16/2019    Procedure: ORIF, FRACTURE, FEMUR, INTERTROCHANTERIC;  Surgeon: Max Mcclellan MD;  Location: [facility];  Service: Orthopedics;  Laterality: Left;       Time Tracking:     PT Received On: 12/17/19  PT Start Time: 0922     PT Stop Time: 0958   PT Start Time: 1328  PT Stop Time: 1348  PT Total Time (min): 56 min     Billable Minutes: Evaluation 15 and Therapeutic Activity 23      Tyra Elaine, PT  12/17/2019

## 2019-12-17 NOTE — ASSESSMENT & PLAN NOTE
Recent Labs   Lab 12/15/19  2355 12/16/19  0800 12/16/19  2058 12/17/19  0752 12/17/19  1024   POCTGLUCOSE 96 141* 72 61* 149*   - last A1C:   Lab Results   Component Value Date    HGBA1C 5.9 (H) 04/08/2019   - hold oral antidiabetic meds   - Diabetic diet   - SSI with accuchekcs AC/HS

## 2019-12-17 NOTE — PLAN OF CARE
Problem: Physical Therapy Goal  Goal: Physical Therapy Goal  Description  Goals to be met by: 1/17/20    Patient will perform the following to increase strength, improve mobility, and return to prior level of function:    1. Supine <> sit with min A.  2. Sit<>stand with min A with least restrictive assistive device.  3. Gait x 30 feet with min A with least restrictive assistive device.   Outcome: Ongoing, Progressing     PT orders received. PT evaluation completed and goals/POC established. Pt tolerated evaluation well with no adverse reactions. Pt required mod to max A of 2 people for all mobility. Limited by L hip pain and tremors. Pt will benefit from skilled PT services to address impairments and functional limitations. Recommend discharge to SNF.

## 2019-12-17 NOTE — SUBJECTIVE & OBJECTIVE
Interval History:   POD#1 IM Nail left hip (12/16/19)  Reports min pain  NVI  Getting ready to get OOB for first time since surgery      Review of Systems   Constitutional: Negative for chills and fever.   HENT: Negative for nosebleeds.    Respiratory: Negative for cough, chest tightness and shortness of breath.    Cardiovascular: Negative for chest pain, palpitations and leg swelling.   Gastrointestinal: Negative for abdominal distention, abdominal pain and blood in stool.   Genitourinary: Negative for dysuria and flank pain.   Musculoskeletal: Negative for arthralgias, back pain, joint swelling and neck stiffness.   Skin: Negative for color change and pallor.   Neurological: Negative for dizziness, seizures and syncope.   Hematological: Negative for adenopathy. Does not bruise/bleed easily.     Objective:     Vital Signs (Most Recent):  Temp: 97.5 °F (36.4 °C) (12/17/19 1136)  Pulse: 88 (12/17/19 1136)  Resp: 16 (12/17/19 1136)  BP: 139/63 (12/17/19 1136)  SpO2: (!) 85 % (12/17/19 1136) Vital Signs (24h Range):  Temp:  [97.5 °F (36.4 °C)-98.3 °F (36.8 °C)] 97.5 °F (36.4 °C)  Pulse:  [73-97] 88  Resp:  [16-22] 16  SpO2:  [85 %-100 %] 85 %  BP: (124-146)/(61-86) 139/63     Weight: 95.3 kg (210 lb 1.6 oz)  Body mass index is 28.49 kg/m².    Intake/Output Summary (Last 24 hours) at 12/17/2019 1225  Last data filed at 12/17/2019 0500  Gross per 24 hour   Intake 1050 ml   Output 800 ml   Net 250 ml      Physical Exam   Constitutional: No distress.   HENT:   Head: Normocephalic and atraumatic.   Eyes: Conjunctivae are normal.   Cardiovascular: Regular rhythm and normal heart sounds.   No murmur heard.  Pulmonary/Chest: Effort normal and breath sounds normal.   Abdominal: Soft. Bowel sounds are normal. He exhibits no distension.   Musculoskeletal:   NVI  badnage over incision c/d/i   No visible hematoma   Skin: Skin is warm and dry. No rash noted.   Psychiatric: He has a normal mood and affect.       Significant Labs:    BMP:   Recent Labs   Lab 12/17/19  0501   GLU 80      K 4.0      CO2 24   BUN 36*   CREATININE 1.3   CALCIUM 8.7     CBC:   Recent Labs   Lab 12/15/19  1726 12/16/19  0428 12/17/19  0501   WBC 11.68 9.50 7.81   HGB 12.8* 13.4* 10.9*   HCT 38.5* 41.3 34.2*   * 112* 110*       Significant Imaging: I have reviewed all pertinent imaging results/findings within the past 24 hours.

## 2019-12-17 NOTE — PLAN OF CARE
Initial Discharge Planning Assessment:  Patient admitted on 12/15/2019    Chart reviewed, Care plan discussed with treatment team,  attending Dr Little    PCP updated in [facility]: Dr. ELIZA Ramirez   Pharmacy, updated in [facility]: [facility] provides    DME at home: Pt uses DME at [facility]    Current dispo: Pt has been a resident at Assisted Living ([facility]) for one year, last week moved into [facility] ([facility]).  Was resident in NH for one day when fall occurred.      Pt sonJesus (POA) provided options for SNF vs return to [facility].  Jesus states pt has been in SNF in the past and does not want pt to go anywhere other than [facility].     Spoke with Karolyn at [facility] [phone].  They can provide up to 3 days per week of therapy and can accept pt back tomorrow after reviewing of MD orders.  MD completed.  Orders faxed to [facility].  Awaiting their review and confirmation of acceptance.     Transportation: [facility] contracts with [facility].  Pt will need ambulance/stretcher due to hip fracture.  Ambulance flowsheet completed in Epic.     Power of  or Living Will: On file in NH lists sonJesus as healthcare POA.  In Epic, Advance directive states pt is a DNR, however code status states full code.  MD notified.    Case management  to follow.       12/17/19 6906   Discharge Assessment   Assessment Type Discharge Planning Assessment   Confirmed/corrected address and phone number on facesheet? Yes   Assessment information obtained from? Patient   Communicated expected length of stay with patient/caregiver yes   Prior to hospitilization cognitive status: Not Oriented to Place;Not Oriented to Time   Prior to hospitalization functional status: Partially Dependent   Current cognitive status: Not Oriented to Place;Not Oriented to Time   Current Functional Status: Partially Dependent   Lives With facility resident   Able to Return to Prior Arrangements yes   Is patient able to care  for self after discharge? No   Who are your caregiver(s) and their phone number(s)? POA: Son: Jesus Parmar [phone]   Patient's perception of discharge disposition nursing home   Readmission Within the Last 30 Days no previous admission in last 30 days   Patient currently being followed by outpatient case management? No   Patient currently receives any other outside agency services? No   Do you have any problems affording any of your prescribed medications? No   Is the patient taking medications as prescribed? yes   Does the patient have transportation home? No  (pt will need ambulance/stretcher transport home)   Does the patient receive services at the [facility]? No   Discharge Plan A Return to nursing home   DME Needed Upon Discharge  none   Patient/Family in Agreement with Plan yes

## 2019-12-17 NOTE — ANESTHESIA POSTPROCEDURE EVALUATION
Anesthesia Post Evaluation    Patient: Silvano Parmar    Procedure(s) Performed: Procedure(s) (LRB):  ORIF, FRACTURE, FEMUR, INTERTROCHANTERIC (Left)    Final Anesthesia Type: general    Patient location during evaluation: PACU  Patient participation: Yes- Able to Participate  Level of consciousness: awake and alert  Post-procedure vital signs: reviewed and stable  Pain management: adequate  Airway patency: patent    PONV status at discharge: No PONV  Anesthetic complications: no      Cardiovascular status: blood pressure returned to baseline  Respiratory status: unassisted and nasal cannula  Hydration status: euvolemic  Follow-up not needed.          Vitals Value Taken Time   /67 12/16/2019  6:30 PM   Temp 36.4 °C (97.6 °F) 12/16/2019 11:57 AM   Pulse 76 12/16/2019  6:39 PM   Resp 16 12/16/2019  6:30 PM   SpO2 100 % 12/16/2019  6:39 PM   Vitals shown include unvalidated device data.      No case tracking events are documented in the log.      Pain/Conrad Score: Conrad Score: 9 (12/16/2019  6:20 PM)

## 2019-12-17 NOTE — PLAN OF CARE
[facility]   Department of Hospital Medicine  [address], [place] [address]  [phone] (phone)  [phone] (fax)      Facility Transfer Orders                        12/17/2019    Silvano Parmar    Admit to: Return to shelter NH at [facility]     Diagnoses:  Active Hospital Problems    Diagnosis  POA    *Closed intertrochanteric fracture of hip, left, initial encounter [S72.142A]  Yes    Acute on chronic renal insufficiency [N28.9, N18.9]  Yes    Closed displaced intertrochanteric fracture of left femur [S72.142A]  Yes    Debility [R53.81]  Yes    Chronic idiopathic thrombocytopenia [D69.3]  Yes    Essential hypertension [I10]  Yes     Chronic    Type 2 diabetes mellitus, without long-term current use of insulin [E11.9]  Yes     Chronic    Persistent atrial fibrillation [I48.19]  Yes      Resolved Hospital Problems   No resolved problems to display.       Allergies:  Review of patient's allergies indicates:   Allergen Reactions    Demerol [meperidine]        Vitals:       Once weekly    Diet: Diabetic 2800 Calorie     Supplement:  1 can every three times a day with meals                         Type:  Glucerna       Activity:    - Up in a chair each morning as tolerated   - Weight bearing: WBAT       Nursing Precautions:   - Fall precautions   - Decubitus precautions:   - Pressure reducing foam mattress   - Turn patient every two hours. Use wedge pillows to anchor patient      CONSULTS:      PT to evaluate and treat - thee times a week     OT to evaluate and treat - three times a week     ST to evaluate and treat as needed     Nutrition to evaluate and recommend diet    Leave dressing on for 7 days  Apply ice 3 x daily to help with swelling  After 7 days can apply new dry dressing  No bathing. Pt may shower       Medications: Discontinue all previous medication orders, if any. See new list below.      Current Discharge Medication List      START taking  these medications    Details   aspirin 81 MG Chew Take 1 tablet (81 mg total) by mouth 2 (two) times daily. For 4 weeks post-op for VTE prophylaxis  ( stop 1/17/20)  Refills: 0      senna-docusate 8.6-50 mg (PERICOLACE) 8.6-50 mg per tablet Take 1 tablet by mouth 2 (two) times daily as needed for Constipation.         CONTINUE these medications which have NOT CHANGED    Details   acetaminophen (TYLENOL) 325 mg Cap Take by mouth.      carbidopa-levodopa  mg (SINEMET)  mg per tablet Take 2 tablets by mouth 3 (three) times daily.       cholecalciferol, vitamin D3, (VITAMIN D3) 4,000 unit Cap Take 1 capsule by mouth once daily.       diltiaZEM (CARDIZEM CD) 120 MG Cp24 TAKE ONE CAPSULE BY MOUTH ONCE DAILY  Qty: 90 capsule, Refills: 3    Comments: This prescription was filled on 6/1/2019. Any refills authorized will be placed on file.  Associated Diagnoses: Essential hypertension      esomeprazole (NEXIUM) 40 MG capsule Take 22.3 mg by mouth before breakfast.       fluticasone (FLONASE) 50 mcg/actuation nasal spray 1 spray (50 mcg total) by Each Nare route once daily.  Qty: 16 g, Refills: 11      FOLIC ACID/MULTIVITS-MIN/LUT (CENTRUM SILVER ORAL) Take 1 tablet by mouth once daily.      furosemide (LASIX) 20 MG tablet TAKE 1 TABLET BY MOUTH EVERY DAY  Qty: 90 tablet, Refills: 0      gabapentin (NEURONTIN) 300 MG capsule TAKE ONE CAPSULE BY MOUTH TWICE DAILY  Qty: 60 capsule, Refills: 6    Comments: This prescription was filled on 6/1/2019. Any refills authorized will be placed on file.      GENERLAC 10 gram/15 mL solution Take 10 g by mouth daily as needed.       guaifenesin 100 mg/5 ml (ROBITUSSIN) 100 mg/5 mL syrup Take 200 mg by mouth every 4 (four) hours as needed for Cough.      JANUVIA 50 mg Tab TAKE ONE TABLET BY MOUTH ONCE DAILY  Qty: 90 tablet, Refills: 3      losartan (COZAAR) 50 MG tablet TAKE ONE TABLET BY MOUTH ONCE DAILY  Qty: 30 tablet, Refills: 6      mirabegron (MYRBETRIQ) 50 mg Tb24 Take 1  tablet (50 mg total) by mouth once daily.  Qty: 30 tablet, Refills: 11      pramipexole (MIRAPEX) 1 MG tablet Take 1 tablet by mouth 3 (three) times daily.      tamsulosin (FLOMAX) 0.4 mg Cap Take 1 capsule (0.4 mg total) by mouth once daily.  Qty: 90 capsule, Refills: 3    Associated Diagnoses: Benign non-nodular prostatic hyperplasia with lower urinary tract symptoms         STOP taking these medications       aspirin (ECOTRIN) 81 MG EC tablet Comments:   Reason for Stopping:         meloxicam (MOBIC) 15 MG tablet Comments:   Reason for Stopping:                 F/u : Dr Max Han [facility]       ___________  Dr. Little  12/17/2019

## 2019-12-17 NOTE — NURSING
AAOX4. Pt free of trauma, falls, injury and skin breakdown. Pain controlled w/ PO tylenol. Pt denies pain at this time. Ice applied to site. BG monitored. Neurovascular checks performed per order. Cont. IV fluids. Pt has been eating adequately throughout shift. Pt. voided once. Pt. repositions self w/ assisstance x 2. Purposeful hourly rounding. Pt has call light in reach, side rails up X2, bed in low position and SCD's/ nonskid socks on. Pt lying in bed in no distress. Will cont. to monitor.

## 2019-12-17 NOTE — DISCHARGE INSTRUCTIONS
Anesthesia: After Your Surgery  Youve just had surgery. During surgery, you received medication called anesthesia to keep you comfortable and pain-free. After surgery, you may experience some pain or nausea. This is common. Here are some tips for feeling better and recovering after surgery.    Going home  Your doctor or nurse will show you how to take care of yourself when you go home. He or she will also answer your questions. Have an adult family member or friend drive you home. For the first 24 hours after your surgery:    · Do not drive or use heavy equipment.  · Do not make important decisions or sign legal documents.  · Avoid alcohol.  · Have someone stay with you, if needed. He or she can watch for problems and help keep you safe.  · Take your time getting up from a seated or lying position. You may experience dizziness for 24 hours.    Be sure to keep all follow-up appointments with your doctor. And rest after your procedure for as long as your doctor tells you to.    Coping with pain  If you have pain after surgery, pain medication will help you feel better. Take it as directed, before pain becomes severe. Also, ask your doctor or pharmacist about other ways to control pain, such as with heat, ice, and relaxation. And follow any other instructions your surgeon or nurse gives you.    URINARY RETENTION  Should you experience a decrease in your urine output or are unable to urinate following surgery, this can be due to the medications given during surgery.  We recommend you going to the nearest Emergency Department.    Tips for taking pain medication  To get the best relief possible, remember these points:    · Pain medications can upset your stomach. Taking them with a little food may help.  · Most pain relievers taken by mouth need at least 20 to 30 minutes to take effect.  · Taking medication on a schedule can help you remember to take it. Try to time your medication so that you can take it before  beginning an activity, such as dressing, walking, or sitting down for dinner.  · Constipation is a common side effect of pain medications. Contact your doctor before taking any medications like laxatives or stool softeners to help relieve constipation. Also ask about any dietary restrictions, because drinking lots of fluids and eating foods like fruits and vegetables that are high in fiber can also help. Remember, dont take laxatives unless your surgeon has prescribed them.  · Mixing alcohol and pain medication can cause dizziness and slow your breathing. It can even be fatal. Dont drink alcohol while taking pain medication.  · Pain medication can slow your reflexes. Dont drive or operate machinery while taking pain medication.    If your health care provider tells you to take acetaminophen to help relieve your pain, ask him or her how much you are supposed to take each day. (Acetaminophen is the generic name for Tylenol and other brand-name pain relievers.) Acetaminophen or other pain relievers may interact with your prescription medicines or other over-the-counter (OTC) drugs. Some prescription medications contain acetaminophen along with other active ingredients. Using both prescription and OTC acetaminophen for pain can cause you to overdose. The FDA recommends that you read the labels on your OTC medications carefully. This will help you to clearly understand the list of active ingredients, dosing instructions, and any warnings. It may also help you avoid taking too much acetaminophen. If you have questions or don't understand the information, ask your pharmacist or health care provider to explain it to you before you take the OTC medication.    Managing nausea  Some people have an upset stomach after surgery. This is often due to anesthesia, pain, pain medications, or the stress of surgery. The following tips will help you manage nausea and get good nutrition as you recover. If you were on a special diet  before surgery, ask your doctor if you should follow it during recovery. These tips may help:    · Dont push yourself to eat. Your body will tell you when to eat and how much.  · Start off with clear liquids and soup. They are easier to digest.  · Progress to semi-solid foods (mashed potatoes, applesauce, and gelatin) as you feel ready.  · Slowly move to solid foods. Dont eat fatty, rich, or spicy foods at first.  · Dont force yourself to have three large meals a day. Instead, eat smaller amounts more often.  · Take pain medications with a small amount of solid food, such as crackers or toast to avoid nausea.      Call your surgeon if    · You feel too sleepy, dizzy, or groggy (medication may be too strong).  · You have side effects like nausea, vomiting, or skin changes (rash, itching, or hives).     © 9641-5279 [employer]. [address] [place] [address], [place] [address]. All rights reserved. This information is not intended as a substitute for professional medical care. Always follow your healthcare professional's instructions.    PLEASE FOLLOW ANY OTHER INSTRUCTIONS PROVIDED TO YOU BY DR. HORNE!    Recovery After Discharge: Dr. Horne Post Operative Information    Physical Therapy  - You may require therapy. These instructions will be based on your specific type of fracture.    Pain Management  - Some degree of pain is normal and expected. You will improve gradually as your muscles become stronger and healing continues. This speed of recovery is different for every patient and you should not compare your recovery to another friend or relative.   - You will be discharged with pain medications. You should wean off of these as tolerated by 4-6 weeks. but it is expected you will need them in the immediate post operative period and for therapy.   - Pain medications can have common side effects of constipation which you should take a laxative, nausea which you should take medication with food, itching  which can be alleviated with Benadryl, and confusion which you should stop taking pain medications and call our office if this occurs.   - Be aware of your ingestion of Tylenol if your pain medication has this in it, you should not exceed 3000mg of Acetaminophen as this can damage your liver.   - If you require a pain medication refill, you will need to contact our office at least 3 days in advance to prescription running out. Prescriptions cannot be called into a pharmacy. You will need to  a prescription in one of our office locations depending on our clinic availability.     Swelling  - You will have swelling of the post operative leg. Swelling may get worse with activity. It will likely get worse in the 2-3 days after discharge from the hospital. Typically swelling is correlated to pain. It can be helpful to elevate your extremity above the level of your heart and consistent use of ice.     Anticoagulation  - You will be placed on a blood thinner to prevent blood clots. You will need to take this as directed.        Wound Care  - Your dressing needs will be outlined based on your surgery. Once your splint or dressing is removed, you may leave the wound open to air as so long there is no drainage. You can continue to shower but NO scrubbing the incision, should pat dry. NO soaking the wound in a bathtub, hot tub, pool, ocean etc. for at least 6 weeks after surgery. Your incision will likely be glued on the skin and no sutures should need to be removed post operatively.  You can begin putting on Vitamin E based lotions on the wound around 6-8 weeks post operatively when there is no remaining scabbing of the incision.   - If there is any drainage post operatively you should contact our office immediately    Other Considerations  - No anti-inflammatories should be used for 3 weeks following surgery  - No driving for about 2-4 weeks following surgery on the left leg and about 6-12 weeks after surgery on the  right leg. You will need to be off pain medications completely. You will need to be able to perform evasive driving maneuvers without hesitation.    - You can fly about 2 weeks post-operatively. However, we may recommend alterative blood thinners if this will take place.   - Return to work is patient specific. If a desk job, it may be 2-4 weeks for motivated individuals. Patients in strenuous or physical jobs may be out for 12 weeks.     Follow-up appointments  - Your first post operative visit will be about 2 weeks after surgery. You will have x-rays at this appointment  - Your second post operative visit will be about 6 weeks after surgery. You will have x-rays again.  - Your third post operative visit will be about 12 weeks after surgery. X-rays are taken at this time.

## 2019-12-17 NOTE — PROGRESS NOTES
ORTHOPAEDIC POSTOP PROGRESS NOTE     Subjective   Patient states that they are comfortable. Sleeping.     Objective   VITAL SIGNS: /83 (BP Location: Right arm, Patient Position: Lying)   Pulse 85   Temp 97.5 °F (36.4 °C) (Oral)   Resp 18   Ht 6' (1.829 m)   Wt 95.3 kg (210 lb 1.6 oz)   SpO2 (!) 91%   BMI 28.49 kg/m²    INTAKE AND OUTPUT:     Intake/Output Summary (Last 24 hours) at 12/17/2019 0830  Last data filed at 12/17/2019 0500  Gross per 24 hour   Intake 1050 ml   Output 1000 ml   Net 50 ml        PHYSICAL EXAMINATION:  Left Lower Extremity:    Dorsalis pedis pulses palpable     Dorsi flexion 5/5 strength    Plantar flexion 5/5 strength     Extensor hallucis extension: 5/5 strength    Sensory intact to light touch L1-S1    Dressing clean/dry/intact    LABS: CBC 10.9      Assessment/Plan:    Weight Bearing As Tolerated  23 hours IV Abx  No hip precautions  Aquacel x7days  PO Pain Meds, limit narcotics  PT/OT  Would typically recommend Lovenox but patient with acute ENZO. Not in favor or xalerto with fall risk. Would likely be ok with ASA BID for 4 weeks for VTE if deemed appropriate by medicine team.   Dispo: [facility] pending       Problem List:   Patient Active Problem List    Diagnosis Date Noted    Closed intertrochanteric fracture of hip, left, initial encounter 12/15/2019    Acute on chronic renal insufficiency 12/15/2019    Closed displaced intertrochanteric fracture of left femur 12/15/2019    Debility 04/09/2019    TIA (transient ischemic attack) 04/08/2019    Chronic idiopathic thrombocytopenia 11/09/2018    Essential hypertension 10/13/2014    Benign prostatic hyperplasia 10/13/2014    GERD (gastroesophageal reflux disease) 10/13/2014    Spinal stenosis 10/13/2014    Type 2 diabetes mellitus, without long-term current use of insulin 05/28/2013    Persistent atrial fibrillation 11/27/2012    Aortic stenosis 11/27/2012    Parkinson disease 11/27/2012

## 2019-12-17 NOTE — PROGRESS NOTES
[facility] Hospital Medicine  Progress Note    Patient Name: Silvano Parmar  MRN: [id number]  Patient Class: IP- Inpatient   Admission Date: 12/15/2019  Length of Stay: 2 days  Attending Physician: Nahum Little MD  Primary Care Provider: Silvestre Ramirez MD        Subjective:     Principal Problem:Closed intertrochanteric fracture of hip, left, initial encounter    Today's Plan  OOB with PT/OT  Start BID ASA for VTE prophylaxis  Start D/C planning based on PT/OT recs    HPI:  Mr. Silvano Parmar is a 89 y.o. male, with PMH of A. Fib, s/p st. Sven Pacemaker insertion, thrombocytopenia, NIDDM-2, diabatic neuropathy, Parkinson's Disease, CKD-II, HTN, who [place] [facility] [place] from [facility] after he fell three days TPA and had persistent left hip pain. He had a deformity of the LLE. He denied head trauma of LOC. A mobile x-ray completed at [facility] today showed a left hip fracture. The nursing staff reported he has been having increased falls lately. In the ED, imaging confirmed an acute left intertrochanteric fracture of the left femur. Labs additionally showed persistence of thrombocytopenia, as well as elevated BUN/Cr above baseline. He was admitted to inpatient status.      Overview/Hospital Course:  No notes on file    Interval History:   POD#1 IM Nail left hip (12/16/19)  Reports min pain  NVI  Getting ready to get OOB for first time since surgery      Review of Systems   Constitutional: Negative for chills and fever.   HENT: Negative for nosebleeds.    Respiratory: Negative for cough, chest tightness and shortness of breath.    Cardiovascular: Negative for chest pain, palpitations and leg swelling.   Gastrointestinal: Negative for abdominal distention, abdominal pain and blood in stool.   Genitourinary: Negative for dysuria and flank pain.   Musculoskeletal: Negative for arthralgias, back pain, joint swelling and neck stiffness.   Skin: Negative for color change  and pallor.   Neurological: Negative for dizziness, seizures and syncope.   Hematological: Negative for adenopathy. Does not bruise/bleed easily.     Objective:     Vital Signs (Most Recent):  Temp: 97.5 °F (36.4 °C) (12/17/19 1136)  Pulse: 88 (12/17/19 1136)  Resp: 16 (12/17/19 1136)  BP: 139/63 (12/17/19 1136)  SpO2: (!) 85 % (12/17/19 1136) Vital Signs (24h Range):  Temp:  [97.5 °F (36.4 °C)-98.3 °F (36.8 °C)] 97.5 °F (36.4 °C)  Pulse:  [73-97] 88  Resp:  [16-22] 16  SpO2:  [85 %-100 %] 85 %  BP: (124-146)/(61-86) 139/63     Weight: 95.3 kg (210 lb 1.6 oz)  Body mass index is 28.49 kg/m².    Intake/Output Summary (Last 24 hours) at 12/17/2019 1225  Last data filed at 12/17/2019 0500  Gross per 24 hour   Intake 1050 ml   Output 800 ml   Net 250 ml      Physical Exam   Constitutional: No distress.   HENT:   Head: Normocephalic and atraumatic.   Eyes: Conjunctivae are normal.   Cardiovascular: Regular rhythm and normal heart sounds.   No murmur heard.  Pulmonary/Chest: Effort normal and breath sounds normal.   Abdominal: Soft. Bowel sounds are normal. He exhibits no distension.   Musculoskeletal:   NVI  badnage over incision c/d/i   No visible hematoma   Skin: Skin is warm and dry. No rash noted.   Psychiatric: He has a normal mood and affect.       Significant Labs:   BMP:   Recent Labs   Lab 12/17/19  0501   GLU 80      K 4.0      CO2 24   BUN 36*   CREATININE 1.3   CALCIUM 8.7     CBC:   Recent Labs   Lab 12/15/19  1726 12/16/19  0428 12/17/19  0501   WBC 11.68 9.50 7.81   HGB 12.8* 13.4* 10.9*   HCT 38.5* 41.3 34.2*   * 112* 110*       Significant Imaging: I have reviewed all pertinent imaging results/findings within the past 24 hours.      Assessment/Plan:      * Closed intertrochanteric fracture of hip, left, initial encounter  POD #1 IM nail ( 12/16/19)  Weight Bearing As Tolerated  PT/OT  ASA BID for 4 weeks     Acute on chronic renal insufficiency  - baseline Cr 1.1  -improving today  1.3  -monitor    Debility  S/p hip fracture and repair  PT/OT      Chronic idiopathic thrombocytopenia  - noted on today's labs   - monitor     Essential hypertension  - controlled  -cont meds  - monitor      Type 2 diabetes mellitus, without long-term current use of insulin  Recent Labs   Lab 12/15/19  2355 12/16/19  0800 12/16/19  2058 12/17/19  0752 12/17/19  1024   POCTGLUCOSE 96 141* 72 61* 149*   - last A1C:   Lab Results   Component Value Date    HGBA1C 5.9 (H) 04/08/2019   - hold oral antidiabetic meds   - Diabetic diet   - SSI with accuchekcs AC/HS        Persistent atrial fibrillation  - continue home meds: diltiazem   - has St. Sven Pacemaker   - follows w/ Cardiology, Dr. Juan         VTE Risk Mitigation (From admission, onward)         Ordered     IP VTE HIGH RISK PATIENT  Once      12/15/19 2218     Place sequential compression device  Until discontinued      12/15/19 2218     Reason for No Pharmacological VTE Prophylaxis  Once     Question:  Reasons:  Answer:  Physician Provided (leave comment)    12/15/19 2218                      Nahum Little MD  Department of Hospital Medicine   [facility]

## 2019-12-18 VITALS
OXYGEN SATURATION: 93 % | RESPIRATION RATE: 17 BRPM | HEART RATE: 83 BPM | HEIGHT: 72 IN | TEMPERATURE: 98 F | SYSTOLIC BLOOD PRESSURE: 148 MMHG | WEIGHT: 210.13 LBS | BODY MASS INDEX: 28.46 KG/M2 | DIASTOLIC BLOOD PRESSURE: 70 MMHG

## 2019-12-18 LAB
ANION GAP SERPL CALC-SCNC: 8 MMOL/L (ref 8–16)
BUN SERPL-MCNC: 28 MG/DL (ref 8–23)
CALCIUM SERPL-MCNC: 8.5 MG/DL (ref 8.7–10.5)
CHLORIDE SERPL-SCNC: 106 MMOL/L (ref 95–110)
CO2 SERPL-SCNC: 23 MMOL/L (ref 23–29)
CREAT SERPL-MCNC: 0.9 MG/DL (ref 0.5–1.4)
ERYTHROCYTE [DISTWIDTH] IN BLOOD BY AUTOMATED COUNT: 13.9 % (ref 11.5–14.5)
EST. GFR  (AFRICAN AMERICAN): >60 ML/MIN/1.73 M^2
EST. GFR  (NON AFRICAN AMERICAN): >60 ML/MIN/1.73 M^2
GLUCOSE SERPL-MCNC: 102 MG/DL (ref 70–110)
HCT VFR BLD AUTO: 29.4 % (ref 40–54)
HGB BLD-MCNC: 9.5 G/DL (ref 14–18)
MCH RBC QN AUTO: 32.3 PG (ref 27–31)
MCHC RBC AUTO-ENTMCNC: 32.3 G/DL (ref 32–36)
MCV RBC AUTO: 100 FL (ref 82–98)
PLATELET # BLD AUTO: 121 K/UL (ref 150–350)
PMV BLD AUTO: 10.6 FL (ref 9.2–12.9)
POCT GLUCOSE: 108 MG/DL (ref 70–110)
POCT GLUCOSE: 98 MG/DL (ref 70–110)
POTASSIUM SERPL-SCNC: 3.9 MMOL/L (ref 3.5–5.1)
RBC # BLD AUTO: 2.94 M/UL (ref 4.6–6.2)
SODIUM SERPL-SCNC: 137 MMOL/L (ref 136–145)
WBC # BLD AUTO: 7.12 K/UL (ref 3.9–12.7)

## 2019-12-18 PROCEDURE — 99239 HOSP IP/OBS DSCHRG MGMT >30: CPT | Mod: ,,, | Performed by: INTERNAL MEDICINE

## 2019-12-18 PROCEDURE — 25000003 PHARM REV CODE 250: Performed by: PHYSICIAN ASSISTANT

## 2019-12-18 PROCEDURE — 25000003 PHARM REV CODE 250: Performed by: ORTHOPAEDIC SURGERY

## 2019-12-18 PROCEDURE — 85027 COMPLETE CBC AUTOMATED: CPT

## 2019-12-18 PROCEDURE — 25000003 PHARM REV CODE 250: Performed by: INTERNAL MEDICINE

## 2019-12-18 PROCEDURE — 36415 COLL VENOUS BLD VENIPUNCTURE: CPT

## 2019-12-18 PROCEDURE — 80048 BASIC METABOLIC PNL TOTAL CA: CPT

## 2019-12-18 PROCEDURE — 99239 PR HOSPITAL DISCHARGE DAY,>30 MIN: ICD-10-PCS | Mod: ,,, | Performed by: INTERNAL MEDICINE

## 2019-12-18 PROCEDURE — 97535 SELF CARE MNGMENT TRAINING: CPT

## 2019-12-18 RX ADMIN — ASPIRIN 81 MG 81 MG: 81 TABLET ORAL at 09:12

## 2019-12-18 RX ADMIN — HYDROCODONE BITARTRATE AND ACETAMINOPHEN 1 TABLET: 5; 325 TABLET ORAL at 12:12

## 2019-12-18 RX ADMIN — POLYETHYLENE GLYCOL 3350 17 G: 17 POWDER, FOR SOLUTION ORAL at 09:12

## 2019-12-18 RX ADMIN — TAMSULOSIN HYDROCHLORIDE 0.4 MG: 0.4 CAPSULE ORAL at 09:12

## 2019-12-18 RX ADMIN — LOSARTAN POTASSIUM 50 MG: 50 TABLET, FILM COATED ORAL at 09:12

## 2019-12-18 RX ADMIN — DOCUSATE SODIUM AND SENNOSIDES 1 TABLET: 50; 8.6 TABLET, FILM COATED ORAL at 09:12

## 2019-12-18 RX ADMIN — DILTIAZEM HYDROCHLORIDE 120 MG: 120 CAPSULE, COATED, EXTENDED RELEASE ORAL at 09:12

## 2019-12-18 RX ADMIN — GABAPENTIN 300 MG: 300 CAPSULE ORAL at 09:12

## 2019-12-18 RX ADMIN — CARBIDOPA AND LEVODOPA 2 TABLET: 25; 100 TABLET ORAL at 09:12

## 2019-12-18 RX ADMIN — PRAMIPEXOLE DIHYDROCHLORIDE 1 MG: 0.5 TABLET ORAL at 09:12

## 2019-12-18 NOTE — PLAN OF CARE
Problem: Occupational Therapy Goal  Goal: Occupational Therapy Goal  Description  Goals to be met by: 12/24/2019     Patient will increase functional independence with ADLs by performing:    Feeding with Stand-by Assistance.  UE Dressing with Minimal Assistance.  Grooming while seated with Stand-by Assistance.  Toileting from bedside commode with Minimal Assistance for hygiene and clothing management.   Toilet transfer to bedside commode with Moderate Assistance.  Pt will increase postural stability and maintain balance sitting EOB for 5 minutes with CGA.     Outcome: Ongoing, Progressing  Tolerated sitting EOB during session on this date, though with significant posterior lean.  Initially posture balance improved with (B) hands on knees, but as patient fatigued, posterior lean/pushing posteriorly increased.  Tolerated oral hyiene seated at EOB, with Min assist for postural control and set up for tools.  Pleasant and cooperative throughout.  Continue OT services to maximize patient functioning.

## 2019-12-18 NOTE — PLAN OF CARE
Pt was combative towards staff  and kept removing his gown and blankets during the night. Pt keeps removing the telemetry monitor. Pt also removed three Ivs.Pt removed one of the surgical dressings. Pt was also touching the incision site. Dressing replaced and reinforced w/ tegaderm. Remains free from fall, injury, skin breakdown. VSS on RA throughout the night. Positions self-independently. Pain controled w/ PO meds. All alarms active and auduble. TEDs/SCDs in place. Plan of care reviewed w/ pt and all questions answered. Bed locked and in lowest position. Call light w/I reach. No needs at this time. Purposeful hourly rounding. Will continue to monitor.

## 2019-12-18 NOTE — ASSESSMENT & PLAN NOTE
Recent Labs   Lab 12/17/19  1024 12/17/19  1543 12/17/19  1812 12/17/19  2055 12/18/19  0846 12/18/19  1103   POCTGLUCOSE 149* 110 86 134* 98 108   - last A1C:   Lab Results   Component Value Date    HGBA1C 5.9 (H) 04/08/2019   - hold oral antidiabetic meds   - Diabetic diet   - SSI with accuchekcs AC/HS

## 2019-12-18 NOTE — PT/OT/SLP PROGRESS
Physical Therapy      Patient Name:  Silvano Parmar   MRN:  [id number]    Patient not seen today secondary to getting discharged back to [facility].       Guillermo Dejesus, ILYA

## 2019-12-18 NOTE — PLAN OF CARE
Spoke with pt and son at bedside.  Desire return to longterm NH at [facility].    Orders by MD received and sent to Karolyn at [facility].  Approved and accepted to return to Room 312.    Bedside RN provided packet.  Call report to Receiving RN: Love [phone]  Ext [phone].     Pt with dementia and hip fracture, will require stretcher/ambulance transport, flow sheet completed .  [facility] notified to davy Cheatham for this ride.  [facility] to arrive by 1500.     Son, Jesus [phone], notified of estimated transport time.     No further DC needs from CM perspective.       12/18/19 1228   Final Note   Assessment Type Final Discharge Note   Anticipated Discharge Disposition longterm Nu   What phone number can be called within the next 1-3 days to see how you are doing after discharge? [phone]   Hospital Follow Up  Appt(s) scheduled? Yes   Discharge plans and expectations educations in teach back method with documentation complete? Yes   Right Care Referral Info   Post Acute Recommendation Other

## 2019-12-18 NOTE — PT/OT/SLP PROGRESS
"Occupational Therapy   Treatment    Name: Silvano Parmar  MRN: [id number]  Admitting Diagnosis:  Closed intertrochanteric fracture of hip, left, initial encounter  2 Days Post-Op    Recommendations:     Discharge Recommendations: nursing facility, skilled  Discharge Equipment Recommendations:  other (see comments)(TBD at next level of care)  Barriers to discharge:  None    Assessment:     Silvano Parmar is a 89 y.o. male with a medical diagnosis of Closed intertrochanteric fracture of hip, left, initial encounter.  He presents with the following performance deficits affecting function: weakness, impaired endurance, impaired self care skills, impaired functional mobilty, gait instability, impaired balance, impaired cognition, decreased lower extremity function, decreased upper extremity function, decreased safety awareness, pain, decreased ROM, impaired skin, orthopedic precautions.     Tolerated sitting EOB during session on this date, though with significant posterior lean.  Initially posture balance improved with (B) hands on knees, but as patient fatigued, posterior lean/pushing posteriorly increased.  Tolerated oral hyiene seated at EOB, with Min assist for postural control and set up for tools.  Pleasant and cooperative throughout.  Continue OT services to maximize patient functioning.    Rehab Prognosis:  Fair; patient would benefit from acute skilled OT services to address these deficits and reach maximum level of function.       Plan:     Patient to be seen 5 x/week to address the above listed problems via self-care/home management, therapeutic activities, therapeutic exercises  · Plan of Care Expires: 01/16/20  · Plan of Care Reviewed with: patient, daughter    Subjective     Pain/Comfort:  · Pain Rating 1: other (see comments)("a lot when I move", unable to quantify)  · Location - Side 1: Bilateral  · Location - Orientation 1: generalized  · Location 1: hip  · Pain Addressed 1: Reposition, " Distraction  · Pain Rating Post-Intervention 1: 0/10(reported no)    Objective:     Communicated with: Jose Shannon and Jamari prior to session.  Patient found HOB elevated with SCD, FCD upon OT entry to room.    General Precautions: Standard, diabetic, fall   Orthopedic Precautions:LLE weight bearing as tolerated   Braces: N/A     Occupational Performance:     Bed Mobility:    · Patient completed Scooting/Bridging with maximal assistance  · Patient completed Supine to Sit with maximal assistance  · Patient completed Sit to Supine with total assistance and 2 persons     Sitting Balance:  Patient required Max to Min assist for sitting EOB unsupported secondary to posterior lean/pushing.  Tolerated EOB x 20 min    Functional Mobility/Transfers:  · NT  · Functional Mobility: NT    Activities of Daily Living:  · Grooming: minimum assistance for postural control seated at EOB (assist with denture cleaning) during oral hygiene      [facility] 6 Click ADL: 13    Treatment & Education:  Educated jana on patient performing tasks as tolerated and able.  Verbalized understanding.  Educated patient on role of OT, tasks involved.  Requires reinforcement.    Patient left HOB elevated with all lines intact, call button in reach, bed alarm on, YAQUELIN Kauffman notified and jana presentEducation:      GOALS:   Multidisciplinary Problems     Occupational Therapy Goals        Problem: Occupational Therapy Goal    Goal Priority Disciplines Outcome Interventions   Occupational Therapy Goal     OT, PT/OT Ongoing, Progressing    Description:  Goals to be met by: 12/24/2019     Patient will increase functional independence with ADLs by performing:    Feeding with Stand-by Assistance.  UE Dressing with Minimal Assistance.  Grooming while seated with Stand-by Assistance.  Toileting from bedside commode with Minimal Assistance for hygiene and clothing management.   Toilet transfer to bedside commode with Moderate Assistance.  Pt will increase postural  stability and maintain balance sitting EOB for 5 minutes with CGA.                      Time Tracking:     OT Date of Treatment: 12/18/19  OT Start Time: 1218  OT Stop Time: 1244  OT Total Time (min): 26 min    Billable Minutes:Self Care/Home Management 26    NELLY Ochoa  12/18/2019

## 2019-12-18 NOTE — SUBJECTIVE & OBJECTIVE
Interval History:   POD#2 IM Nail left hip (12/16/19)  Reports min pain  NVI  Getting ready to get OOB for first time since surgery      Review of Systems   Constitutional: Negative for chills and fever.   HENT: Negative for nosebleeds.    Respiratory: Negative for cough, chest tightness and shortness of breath.    Cardiovascular: Negative for chest pain, palpitations and leg swelling.   Gastrointestinal: Negative for abdominal distention, abdominal pain and blood in stool.   Genitourinary: Negative for dysuria and flank pain.   Musculoskeletal: Negative for arthralgias, back pain, joint swelling and neck stiffness.   Skin: Negative for color change and pallor.   Neurological: Negative for dizziness, seizures and syncope.   Hematological: Negative for adenopathy. Does not bruise/bleed easily.     Objective:     Vital Signs (Most Recent):  Temp: 97.6 °F (36.4 °C) (12/18/19 1104)  Pulse: 83 (12/18/19 1104)  Resp: 17 (12/18/19 1104)  BP: (!) 148/70 (12/18/19 1104)  SpO2: (!) 93 % (12/18/19 1104) Vital Signs (24h Range):  Temp:  [97.6 °F (36.4 °C)-98.1 °F (36.7 °C)] 97.6 °F (36.4 °C)  Pulse:  [] 83  Resp:  [16-22] 17  SpO2:  [93 %-96 %] 93 %  BP: (127-150)/(66-90) 148/70     Weight: 95.3 kg (210 lb 1.6 oz)  Body mass index is 28.49 kg/m².    Intake/Output Summary (Last 24 hours) at 12/18/2019 1152  Last data filed at 12/17/2019 1600  Gross per 24 hour   Intake 720 ml   Output --   Net 720 ml      Physical Exam   Constitutional: No distress.   HENT:   Head: Normocephalic and atraumatic.   Eyes: Conjunctivae are normal.   Cardiovascular: Regular rhythm and normal heart sounds.   No murmur heard.  Pulmonary/Chest: Effort normal and breath sounds normal.   Abdominal: Soft. Bowel sounds are normal. He exhibits no distension.   Musculoskeletal:   NVI  badnage over incision c/d/i   No visible hematoma   Skin: Skin is warm and dry. No rash noted.   Psychiatric: He has a normal mood and affect.       Significant Labs:    BMP:   Recent Labs   Lab 12/18/19  0343         K 3.9      CO2 23   BUN 28*   CREATININE 0.9   CALCIUM 8.5*     CBC:   Recent Labs   Lab 12/17/19  0501 12/18/19  0343   WBC 7.81 7.12   HGB 10.9* 9.5*   HCT 34.2* 29.4*   * 121*       Significant Imaging: I have reviewed all pertinent imaging results/findings within the past 24 hours.

## 2019-12-18 NOTE — PT/OT/SLP DISCHARGE
Occupational Therapy Discharge Summary    Silvano Parmar  MRN: [id number]   Principal Problem: Closed intertrochanteric fracture of hip, left, initial encounter      Patient Discharged from acute Occupational Therapy on 12/18/19.  Please refer to prior OT note dated 12/18/19 for functional status.    Assessment:      Patient appropriate for care in another setting.    Objective:     GOALS:   Multidisciplinary Problems     Occupational Therapy Goals        Problem: Occupational Therapy Goal    Goal Priority Disciplines Outcome Interventions   Occupational Therapy Goal     OT, PT/OT Ongoing, Progressing    Description:  Goals to be met by: 12/24/2019     Patient will increase functional independence with ADLs by performing:    Feeding with Stand-by Assistance.  UE Dressing with Minimal Assistance.  Grooming while seated with Stand-by Assistance.  Toileting from bedside commode with Minimal Assistance for hygiene and clothing management.   Toilet transfer to bedside commode with Moderate Assistance.  Pt will increase postural stability and maintain balance sitting EOB for 5 minutes with CGA.                      Reasons for Discontinuation of Therapy Services  Transfer to alternate level of care.      Plan:     Patient Discharged to: Nursing home placement with therapy recommending SNF per EMR    OT on record not available.     Kelsy Meyer, NUHA  12/18/2019

## 2019-12-18 NOTE — DISCHARGE SUMMARY
[facility] Hospital Medicine  Discharge Summary      Patient Name: Silvano Parmar  MRN: [id number]  Admission Date: 12/15/2019  Hospital Length of Stay: 3 days  Discharge Date and Time:  12/18/2019 12:00 PM  Attending Physician: Nahum Little MD   Discharging Provider: Nahum Little MD  Primary Care Provider: Silvestre Ramirez MD      HPI:   Mr. Silvano Parmar is a 89 y.o. male, with PMH of A. Fib, s/p st. Sven Pacemaker insertion, thrombocytopenia, NIDDM-2, diabatic neuropathy, Parkinson's Disease, CKD-II, HTN, who presented to [facility] ED on 12/15/19 from [facility] after he fell three days TPA and had persistent left hip pain. He had a deformity of the LLE. He denied head trauma of LOC. A mobile x-ray completed at [facility] today showed a left hip fracture. The nursing staff reported he has been having increased falls lately. In the ED, imaging confirmed an acute left intertrochanteric fracture of the left femur. Labs additionally showed persistence of thrombocytopenia, as well as elevated BUN/Cr above baseline. He was admitted to inpatient status.      Procedure(s) (LRB):  ORIF, FRACTURE, FEMUR, INTERTROCHANTERIC (Left)      Hospital Course:     IM nail ( 12/16/19) by Dr Julio Mcclellan  Uneventful post- op course  Weight Bearing As Tolerated  PT/OT upon return to NH  ASA BID for 4 weeks  for VTE prophylaxis    Consults:   Consults (From admission, onward)        Status Ordering Provider     Inpatient consult to Orthopedics  Once     Provider:  Julio Mcclellan MD    Completed JONATHON RAHMAN consult case management/social work  Once     Provider:  (Not yet assigned)    JULIO Flannery          No new Assessment & Plan notes have been filed under this hospital service since the last note was generated.  Service: Hospital Medicine    Final Active Diagnoses:    Diagnosis Date Noted POA    PRINCIPAL PROBLEM:  Closed intertrochanteric fracture of hip, left, initial  encounter [S72.142A] 12/15/2019 Yes    Acute on chronic renal insufficiency [N28.9, N18.9] 12/15/2019 Yes    Debility [R53.81] 04/09/2019 Yes    Chronic idiopathic thrombocytopenia [D69.3] 11/09/2018 Yes    Essential hypertension [I10] 10/13/2014 Yes     Chronic    Type 2 diabetes mellitus, without long-term current use of insulin [E11.9] 05/28/2013 Yes     Chronic    Persistent atrial fibrillation [I48.19] 11/27/2012 Yes      Problems Resolved During this Admission:       Discharged Condition: good    Disposition:  [facility]    Follow Up: Dr Max Mcclellan 2 weeks    Patient Instructions:      Diet Adult Regular     Activity as tolerated       Significant Diagnostic Studies: Labs:   BMP:   Recent Labs   Lab 12/17/19  0501 12/18/19  0343   GLU 80 102    137   K 4.0 3.9    106   CO2 24 23   BUN 36* 28*   CREATININE 1.3 0.9   CALCIUM 8.7 8.5*       Pending Diagnostic Studies:     Procedure Component Value Units Date/Time    SURG FL Surgery Fluoro Usage [[id number]]     Order Status:  Sent Lab Status:  No result     SURG FL Surgery Fluoro Usage [[id number]]     Order Status:  Sent Lab Status:  No result          Medications:  Reconciled Home Medications:      Medication List      START taking these medications    aspirin 81 MG Chew  Take 1 tablet (81 mg total) by mouth 2 (two) times daily.  Replaces:  aspirin 81 MG EC tablet     senna-docusate 8.6-50 mg 8.6-50 mg per tablet  Commonly known as:  PERICOLACE  Take 1 tablet by mouth 2 (two) times daily as needed for Constipation.        CONTINUE taking these medications    carbidopa-levodopa  mg  mg per tablet  Commonly known as:  SINEMET  Take 2 tablets by mouth 3 (three) times daily.     CENTRUM SILVER ORAL  Take 1 tablet by mouth once daily.     diltiaZEM 120 MG Cp24  Commonly known as:  CARDIZEM CD  TAKE ONE CAPSULE BY MOUTH ONCE DAILY     esomeprazole 40 MG capsule  Commonly known as:  NEXIUM  Take 22.3 mg by mouth before breakfast.      fluticasone propionate 50 mcg/actuation nasal spray  Commonly known as:  FLONASE  1 spray (50 mcg total) by Each Nare route once daily.     furosemide 20 MG tablet  Commonly known as:  LASIX  TAKE 1 TABLET BY MOUTH EVERY DAY     gabapentin 300 MG capsule  Commonly known as:  NEURONTIN  TAKE ONE CAPSULE BY MOUTH TWICE DAILY     Generlac 10 gram/15 mL solution  Generic drug:  lactulose  Take 10 g by mouth daily as needed.     guaifenesin 100 mg/5 ml 100 mg/5 mL syrup  Commonly known as:  ROBITUSSIN  Take 200 mg by mouth every 4 (four) hours as needed for Cough.     Januvia 50 MG Tab  Generic drug:  SITagliptin  TAKE ONE TABLET BY MOUTH ONCE DAILY     losartan 50 MG tablet  Commonly known as:  COZAAR  TAKE ONE TABLET BY MOUTH ONCE DAILY     mirabegron 50 mg Tb24  Commonly known as:  Myrbetriq  Take 1 tablet (50 mg total) by mouth once daily.     pramipexole 1 MG tablet  Commonly known as:  MIRAPEX  Take 1 tablet by mouth 3 (three) times daily.     tamsulosin 0.4 mg Cap  Commonly known as:  FLOMAX  Take 1 capsule (0.4 mg total) by mouth once daily.     Tylenol 325 mg Cap  Generic drug:  acetaminophen  Take by mouth.     Vitamin D3 4,000 unit Cap  Generic drug:  cholecalciferol (vitamin D3)  Take 1 capsule by mouth once daily.        STOP taking these medications    aspirin 81 MG EC tablet  Commonly known as:  ECOTRIN  Replaced by:  aspirin 81 MG Chew     meloxicam 15 MG tablet  Commonly known as:  MOBIC            Indwelling Lines/Drains at time of discharge:   Lines/Drains/Airways     None                 Time spent on the discharge of patient: 30 minutes  Patient was seen and examined on the date of discharge and determined to be suitable for discharge.         Nahum Little MD  Department of Hospital Medicine  [facility]

## 2019-12-18 NOTE — PROGRESS NOTES
[facility] Hospital Medicine  Progress Note    Patient Name: Silvano Parmar  MRN: [id number]  Patient Class: IP- Inpatient   Admission Date: 12/15/2019  Length of Stay: 3 days  Attending Physician: Nahum Little MD  Primary Care Provider: Silvestre Ramirez MD        Subjective:     Principal Problem:Closed intertrochanteric fracture of hip, left, initial encounter        HPI:  Mr. Silvano Parmar is a 89 y.o. male, with PMH of A. Fib, s/p st. Sven Pacemaker insertion, thrombocytopenia, NIDDM-2, diabatic neuropathy, Parkinson's Disease, CKD-II, HTN, who presented to [facility] ED on 12/15/19 from [facility] after he fell three days TPA and had persistent left hip pain. He had a deformity of the LLE. He denied head trauma of LOC. A mobile x-ray completed at [facility] today showed a left hip fracture. The nursing staff reported he has been having increased falls lately. In the ED, imaging confirmed an acute left intertrochanteric fracture of the left femur. Labs additionally showed persistence of thrombocytopenia, as well as elevated BUN/Cr above baseline. He was admitted to inpatient status.      Overview/Hospital Course:  No notes on file    Interval History:   POD#2 IM Nail left hip (12/16/19)  Reports min pain  NVI  Getting ready to get OOB for first time since surgery      Review of Systems   Constitutional: Negative for chills and fever.   HENT: Negative for nosebleeds.    Respiratory: Negative for cough, chest tightness and shortness of breath.    Cardiovascular: Negative for chest pain, palpitations and leg swelling.   Gastrointestinal: Negative for abdominal distention, abdominal pain and blood in stool.   Genitourinary: Negative for dysuria and flank pain.   Musculoskeletal: Negative for arthralgias, back pain, joint swelling and neck stiffness.   Skin: Negative for color change and pallor.   Neurological: Negative for dizziness, seizures and syncope.   Hematological: Negative  for adenopathy. Does not bruise/bleed easily.     Objective:     Vital Signs (Most Recent):  Temp: 97.6 °F (36.4 °C) (12/18/19 1104)  Pulse: 83 (12/18/19 1104)  Resp: 17 (12/18/19 1104)  BP: (!) 148/70 (12/18/19 1104)  SpO2: (!) 93 % (12/18/19 1104) Vital Signs (24h Range):  Temp:  [97.6 °F (36.4 °C)-98.1 °F (36.7 °C)] 97.6 °F (36.4 °C)  Pulse:  [] 83  Resp:  [16-22] 17  SpO2:  [93 %-96 %] 93 %  BP: (127-150)/(66-90) 148/70     Weight: 95.3 kg (210 lb 1.6 oz)  Body mass index is 28.49 kg/m².    Intake/Output Summary (Last 24 hours) at 12/18/2019 1152  Last data filed at 12/17/2019 1600  Gross per 24 hour   Intake 720 ml   Output --   Net 720 ml      Physical Exam   Constitutional: No distress.   HENT:   Head: Normocephalic and atraumatic.   Eyes: Conjunctivae are normal.   Cardiovascular: Regular rhythm and normal heart sounds.   No murmur heard.  Pulmonary/Chest: Effort normal and breath sounds normal.   Abdominal: Soft. Bowel sounds are normal. He exhibits no distension.   Musculoskeletal:   NVI  badnage over incision c/d/i   No visible hematoma   Skin: Skin is warm and dry. No rash noted.   Psychiatric: He has a normal mood and affect.       Significant Labs:   BMP:   Recent Labs   Lab 12/18/19  0343         K 3.9      CO2 23   BUN 28*   CREATININE 0.9   CALCIUM 8.5*     CBC:   Recent Labs   Lab 12/17/19  0501 12/18/19  0343   WBC 7.81 7.12   HGB 10.9* 9.5*   HCT 34.2* 29.4*   * 121*       Significant Imaging: I have reviewed all pertinent imaging results/findings within the past 24 hours.      Assessment/Plan:      * Closed intertrochanteric fracture of hip, left, initial encounter  POD #2  IM nail ( 12/16/19)  Weight Bearing As Tolerated  PT/OT  ASA BID for 4 weeks     Acute on chronic renal insufficiency  - baseline Cr 1.1  -improving today 1.3  -monitor    Debility  S/p hip fracture and repair  PT/OT      Chronic idiopathic thrombocytopenia  - noted on today's labs   - monitor      Essential hypertension  - controlled  -cont meds  - monitor      Type 2 diabetes mellitus, without long-term current use of insulin  Recent Labs   Lab 12/17/19  1024 12/17/19  1543 12/17/19  1812 12/17/19  2055 12/18/19  0846 12/18/19  1103   POCTGLUCOSE 149* 110 86 134* 98 108   - last A1C:   Lab Results   Component Value Date    HGBA1C 5.9 (H) 04/08/2019   - hold oral antidiabetic meds   - Diabetic diet   - SSI with accuchekcs AC/HS        Persistent atrial fibrillation  - continue home meds: diltiazem   - has St. Sven Pacemaker   - follows w/ Cardiology, Dr. Juan         VTE Risk Mitigation (From admission, onward)         Ordered     IP VTE HIGH RISK PATIENT  Once      12/15/19 2218     Place sequential compression device  Until discontinued      12/15/19 2218     Reason for No Pharmacological VTE Prophylaxis  Once     Question:  Reasons:  Answer:  Physician Provided (leave comment)    12/15/19 2218                Today's Plan - D/c back to nileshsammy Little MD  Department of Hospital Medicine   [facility]

## 2019-12-23 NOTE — PHYSICIAN QUERY
PT Name: Silvano Parmar  MR #: [id number]  Physician Query Form - Renal Condition Clarification     CDS/: Apple Chapa               Contact information: [phone]    This form is a permanent document in the medical record.     QueryDate: December 23, 2019    By submitting this query, we are merely seeking further clarification of documentation. Please utilize your independent clinical judgment when addressing the question(s) below.    The Medical record contains the following:   Indicator Supporting Clinical Findings Location in Medical Record    x Kidney (Renal) Insufficiency  Acute on chronic renal insufficiency  Hospital Medicine H&P 12/16/2019    x Kidney (Renal) Failure / Injury   ENZO (acute kidney injury)         has possible ENZO based on labs,  ED Provider Notes File Time: 12/15/2019 11:24 PM    Orthopedic Surgery Progress Notes 12/15/2019        Nephrotoxic Agents      x BUN/Creatinine GFR  - baseline Cr 1.1       - today Cr 2.9  [facility] 12/16/2019    Urine: Casts         Eosinophils      x Dehydration  Dehydration      He appears dehydrated on exam and Laboratory studies do show acute elevation of creatinine and BUN, likely secondary to the dehydration.     ED Provider Notes File Time: 12/15/2019 11:24 PM     Nausea/Vomiting      Dialysis/CRRT      x Treatment:   Will be started on IV fluid repletion.       - avoid nephrotoxins, renally dose meds      - monitor   ED Provider Notes File Time: 12/15/2019 11:24 PM     Hospital Medicine H&P 12/16/2019        Other:      Acute Kidney Injury / Acute Renal Failure has different defining criteria. A generally accepted guideline  is:   A greater than 100% (2X) rise in serum creatinine from baseline* occurring during the course of a single hospital stay.   *Baseline as determined by the providers judgment and consideration of previous lab values and other documentation, if available.    A diagnosis of Acute Kidney Injury/ Acute Renal  Failure should incorporate abnormal labs and clinical findings that are clinically significant      References: 1. Ángel et al. Acute renal failure-definition, outcome measures, animal models, fluid therapy and information technology needs: the Second International Consensus Conference of the Acute Dialysis Quality Initiative (ADQI) Group. Crit Care 2004; 8:B204; 2. Fernando et al. Acute Kidney Injury Network: report of an initiative to improve outcomes in acute kidney injury. Crit Care 2007; 11:R31; 3. Kidney Disease: Improving Global Outcomes (KDIGO). Acute Kidney Injury Work Group. KDIGO clinical practice guidelines for acute kidney injury. Kidney Int Suppl 2012; 2:1.    The clinical guidelines noted below is only a system guideline, it does not replace the providers clinical judgment.    Provider, please specify the diagnosis or diagnoses associated with above clinical findings. Please check all that applies.    [   ] Other Acute Kidney Failure/Injury (please specify): ____________     [ X  ] Unspecified Acute Kidney Failure/Injury      [   ] Acute Renal Insufficiency  Consider if SCr rise is transient and normalizes quickly with no efforts at real resuscitation of vital signs and perfusion   [ X  ] Chronic Kidney Disease (CKD) stage 2   Mildly reduced kidney function eGFR 60-89   [   ] Other (please specify): _________________________________   [   ]  Clinically Undetermined       Please document in your progress notes daily for the duration of treatment until resolved and include in your discharge summary.

## 2020-03-25 ENCOUNTER — HOSPITAL ENCOUNTER (INPATIENT)
Facility: OTHER | Age: 85
LOS: 1 days | Discharge: HOSPICE | DRG: 189 | End: 2020-03-26
Attending: EMERGENCY MEDICINE | Admitting: INTERNAL MEDICINE
Payer: MEDICARE

## 2020-03-25 DIAGNOSIS — N17.9 AKI (ACUTE KIDNEY INJURY): ICD-10-CM

## 2020-03-25 DIAGNOSIS — G20.A1 PARKINSON DISEASE: Chronic | ICD-10-CM

## 2020-03-25 DIAGNOSIS — J96.01 ACUTE RESPIRATORY FAILURE WITH HYPOXIA: ICD-10-CM

## 2020-03-25 DIAGNOSIS — I48.19 PERSISTENT ATRIAL FIBRILLATION: ICD-10-CM

## 2020-03-25 DIAGNOSIS — R06.03 RESPIRATORY DISTRESS: Primary | ICD-10-CM

## 2020-03-25 DIAGNOSIS — E87.0 HYPERNATREMIA: ICD-10-CM

## 2020-03-25 DIAGNOSIS — Z20.822 SUSPECTED COVID-19 VIRUS INFECTION: ICD-10-CM

## 2020-03-25 PROBLEM — J12.9 VIRAL PNEUMONIA: Status: ACTIVE | Noted: 2020-03-25

## 2020-03-25 PROBLEM — Z51.5 PALLIATIVE CARE ENCOUNTER: Status: ACTIVE | Noted: 2020-03-25

## 2020-03-25 PROBLEM — U07.1 COVID-19 VIRUS INFECTION: Status: ACTIVE | Noted: 2020-03-25

## 2020-03-25 LAB
ALBUMIN SERPL BCP-MCNC: 2.8 G/DL (ref 3.5–5.2)
ALP SERPL-CCNC: 150 U/L (ref 55–135)
ALT SERPL W/O P-5'-P-CCNC: 10 U/L (ref 10–44)
ANION GAP SERPL CALC-SCNC: 16 MMOL/L (ref 8–16)
ANISOCYTOSIS BLD QL SMEAR: SLIGHT
AST SERPL-CCNC: 93 U/L (ref 10–40)
BASOPHILS NFR BLD: 1 % (ref 0–1.9)
BILIRUB SERPL-MCNC: 1.8 MG/DL (ref 0.1–1)
BNP SERPL-MCNC: 80 PG/ML (ref 0–99)
BUN SERPL-MCNC: 52 MG/DL (ref 8–23)
CALCIUM SERPL-MCNC: 8.5 MG/DL (ref 8.7–10.5)
CHLORIDE SERPL-SCNC: 108 MMOL/L (ref 95–110)
CO2 SERPL-SCNC: 27 MMOL/L (ref 23–29)
CREAT SERPL-MCNC: 2.7 MG/DL (ref 0.5–1.4)
CRP SERPL-MCNC: 91.7 MG/L (ref 0–8.2)
CTP QC/QA: YES
DIFFERENTIAL METHOD: ABNORMAL
EOSINOPHIL NFR BLD: 0 % (ref 0–8)
ERYTHROCYTE [DISTWIDTH] IN BLOOD BY AUTOMATED COUNT: 15.2 % (ref 11.5–14.5)
EST. GFR  (AFRICAN AMERICAN): 23 ML/MIN/1.73 M^2
EST. GFR  (NON AFRICAN AMERICAN): 20 ML/MIN/1.73 M^2
GLUCOSE SERPL-MCNC: 88 MG/DL (ref 70–110)
HCT VFR BLD AUTO: 48.9 % (ref 40–54)
HGB BLD-MCNC: 14.9 G/DL (ref 14–18)
IMM GRANULOCYTES # BLD AUTO: ABNORMAL K/UL (ref 0–0.04)
IMM GRANULOCYTES NFR BLD AUTO: ABNORMAL % (ref 0–0.5)
LACTATE SERPL-SCNC: 1.5 MMOL/L (ref 0.5–2.2)
LYMPHOCYTES NFR BLD: 10 % (ref 18–48)
MCH RBC QN AUTO: 30.5 PG (ref 27–31)
MCHC RBC AUTO-ENTMCNC: 30.5 G/DL (ref 32–36)
MCV RBC AUTO: 100 FL (ref 82–98)
METAMYELOCYTES NFR BLD MANUAL: 2 %
MONOCYTES NFR BLD: 5 % (ref 4–15)
NEUTROPHILS NFR BLD: 79 % (ref 38–73)
NEUTS BAND NFR BLD MANUAL: 3 %
NRBC BLD-RTO: 0 /100 WBC
OVALOCYTES BLD QL SMEAR: ABNORMAL
PLATELET # BLD AUTO: 120 K/UL (ref 150–350)
PLATELET BLD QL SMEAR: ABNORMAL
PMV BLD AUTO: 11.8 FL (ref 9.2–12.9)
POC MOLECULAR INFLUENZA A AGN: NEGATIVE
POC MOLECULAR INFLUENZA B AGN: NEGATIVE
POIKILOCYTOSIS BLD QL SMEAR: SLIGHT
POTASSIUM SERPL-SCNC: 3.5 MMOL/L (ref 3.5–5.1)
PROCALCITONIN SERPL IA-MCNC: 0.14 NG/ML
PROT SERPL-MCNC: 7 G/DL (ref 6–8.4)
RBC # BLD AUTO: 4.88 M/UL (ref 4.6–6.2)
SODIUM SERPL-SCNC: 151 MMOL/L (ref 136–145)
WBC # BLD AUTO: 5.34 K/UL (ref 3.9–12.7)

## 2020-03-25 PROCEDURE — 99222 PR INITIAL HOSPITAL CARE,LEVL II: ICD-10-PCS | Mod: ,,, | Performed by: INTERNAL MEDICINE

## 2020-03-25 PROCEDURE — 84145 PROCALCITONIN (PCT): CPT

## 2020-03-25 PROCEDURE — 80053 COMPREHEN METABOLIC PANEL: CPT

## 2020-03-25 PROCEDURE — 86140 C-REACTIVE PROTEIN: CPT

## 2020-03-25 PROCEDURE — 83880 ASSAY OF NATRIURETIC PEPTIDE: CPT

## 2020-03-25 PROCEDURE — 99222 1ST HOSP IP/OBS MODERATE 55: CPT | Mod: ,,, | Performed by: INTERNAL MEDICINE

## 2020-03-25 PROCEDURE — 96374 THER/PROPH/DIAG INJ IV PUSH: CPT

## 2020-03-25 PROCEDURE — 63600175 PHARM REV CODE 636 W HCPCS: Performed by: EMERGENCY MEDICINE

## 2020-03-25 PROCEDURE — 85027 COMPLETE CBC AUTOMATED: CPT

## 2020-03-25 PROCEDURE — 93010 EKG 12-LEAD: ICD-10-PCS | Mod: ,,, | Performed by: INTERNAL MEDICINE

## 2020-03-25 PROCEDURE — 94761 N-INVAS EAR/PLS OXIMETRY MLT: CPT

## 2020-03-25 PROCEDURE — 99291 CRITICAL CARE FIRST HOUR: CPT | Mod: 25

## 2020-03-25 PROCEDURE — 99285 EMERGENCY DEPT VISIT HI MDM: CPT | Mod: 25

## 2020-03-25 PROCEDURE — 83605 ASSAY OF LACTIC ACID: CPT

## 2020-03-25 PROCEDURE — U0002 COVID-19 LAB TEST NON-CDC: HCPCS

## 2020-03-25 PROCEDURE — 87040 BLOOD CULTURE FOR BACTERIA: CPT | Mod: 59

## 2020-03-25 PROCEDURE — 85007 BL SMEAR W/DIFF WBC COUNT: CPT

## 2020-03-25 PROCEDURE — 93010 ELECTROCARDIOGRAM REPORT: CPT | Mod: ,,, | Performed by: INTERNAL MEDICINE

## 2020-03-25 PROCEDURE — 36415 COLL VENOUS BLD VENIPUNCTURE: CPT

## 2020-03-25 PROCEDURE — 11000001 HC ACUTE MED/SURG PRIVATE ROOM

## 2020-03-25 PROCEDURE — 93005 ELECTROCARDIOGRAM TRACING: CPT

## 2020-03-25 RX ORDER — ONDANSETRON 2 MG/ML
4 INJECTION INTRAMUSCULAR; INTRAVENOUS EVERY 6 HOURS PRN
Status: DISCONTINUED | OUTPATIENT
Start: 2020-03-25 | End: 2020-03-26 | Stop reason: HOSPADM

## 2020-03-25 RX ORDER — GLYCOPYRROLATE 1 MG/1
1 TABLET ORAL 3 TIMES DAILY PRN
Status: DISCONTINUED | OUTPATIENT
Start: 2020-03-25 | End: 2020-03-25

## 2020-03-25 RX ORDER — MORPHINE SULFATE ORAL SOLUTION 10 MG/5ML
5 SOLUTION ORAL
Status: DISCONTINUED | OUTPATIENT
Start: 2020-03-25 | End: 2020-03-26 | Stop reason: HOSPADM

## 2020-03-25 RX ORDER — HYDROMORPHONE HYDROCHLORIDE 1 MG/ML
0.5 INJECTION, SOLUTION INTRAMUSCULAR; INTRAVENOUS; SUBCUTANEOUS
Status: COMPLETED | OUTPATIENT
Start: 2020-03-25 | End: 2020-03-25

## 2020-03-25 RX ORDER — GLYCOPYRROLATE 0.2 MG/ML
0.1 INJECTION INTRAMUSCULAR; INTRAVENOUS EVERY 4 HOURS PRN
Status: DISCONTINUED | OUTPATIENT
Start: 2020-03-25 | End: 2020-03-26 | Stop reason: HOSPADM

## 2020-03-25 RX ORDER — MORPHINE SULFATE ORAL SOLUTION 10 MG/5ML
5 SOLUTION ORAL
Refills: 0
Start: 2020-03-25

## 2020-03-25 RX ADMIN — SODIUM CHLORIDE 1000 ML: 0.9 INJECTION, SOLUTION INTRAVENOUS at 10:03

## 2020-03-25 RX ADMIN — HYDROMORPHONE HYDROCHLORIDE 0.5 MG: 1 INJECTION, SOLUTION INTRAMUSCULAR; INTRAVENOUS; SUBCUTANEOUS at 09:03

## 2020-03-25 NOTE — SUBJECTIVE & OBJECTIVE
Past Medical History:   Diagnosis Date    *Atrial fibrillation     AI (aortic insufficiency)     mild    Atrial fibrillation     Diabetes mellitus     Diabetes mellitus, type 2     Hyperlipemia 11/27/2012    Hyperlipidemia     Hypertension     Memory loss     Parkinson disease     Spinal stenosis memory loss       Past Surgical History:   Procedure Laterality Date    CHOLECYSTECTOMY      OPEN REDUCTION AND INTERNAL FIXATION (ORIF) OF INTERTROCHANTERIC FRACTURE OF FEMUR Left 12/16/2019    Procedure: ORIF, FRACTURE, FEMUR, INTERTROCHANTERIC;  Surgeon: Max Mcclellan MD;  Location: [facility];  Service: Orthopedics;  Laterality: Left;       Review of patient's allergies indicates:   Allergen Reactions    Demerol [meperidine]        No current facility-administered medications on file prior to encounter.      Current Outpatient Medications on File Prior to Encounter   Medication Sig    [DISCONTINUED] acetaminophen (TYLENOL) 325 mg Cap Take by mouth.    [DISCONTINUED] aspirin 81 MG Chew Take 1 tablet (81 mg total) by mouth 2 (two) times daily.    [DISCONTINUED] carbidopa-levodopa  mg (SINEMET)  mg per tablet Take 2 tablets by mouth 3 (three) times daily.     [DISCONTINUED] cholecalciferol, vitamin D3, (VITAMIN D3) 4,000 unit Cap Take 1 capsule by mouth once daily.     [DISCONTINUED] diltiaZEM (CARDIZEM CD) 120 MG Cp24 TAKE ONE CAPSULE BY MOUTH ONCE DAILY    [DISCONTINUED] esomeprazole (NEXIUM) 40 MG capsule Take 22.3 mg by mouth before breakfast.     [DISCONTINUED] fluticasone (FLONASE) 50 mcg/actuation nasal spray 1 spray (50 mcg total) by Each Nare route once daily.    [DISCONTINUED] FOLIC ACID/MULTIVITS-MIN/LUT (CENTRUM SILVER ORAL) Take 1 tablet by mouth once daily.    [DISCONTINUED] furosemide (LASIX) 20 MG tablet TAKE 1 TABLET BY MOUTH EVERY DAY    [DISCONTINUED] gabapentin (NEURONTIN) 300 MG capsule TAKE ONE CAPSULE BY MOUTH TWICE DAILY    [DISCONTINUED] GENERLAC 10 gram/15 mL  solution Take 10 g by mouth daily as needed.     [DISCONTINUED] guaifenesin 100 mg/5 ml (ROBITUSSIN) 100 mg/5 mL syrup Take 200 mg by mouth every 4 (four) hours as needed for Cough.    [DISCONTINUED] JANUVIA 50 mg Tab TAKE ONE TABLET BY MOUTH ONCE DAILY    [DISCONTINUED] losartan (COZAAR) 50 MG tablet TAKE ONE TABLET BY MOUTH ONCE DAILY    [DISCONTINUED] mirabegron (MYRBETRIQ) 50 mg Tb24 Take 1 tablet (50 mg total) by mouth once daily.    [DISCONTINUED] pramipexole (MIRAPEX) 1 MG tablet Take 1 tablet by mouth 3 (three) times daily.    [DISCONTINUED] senna-docusate 8.6-50 mg (PERICOLACE) 8.6-50 mg per tablet Take 1 tablet by mouth 2 (two) times daily as needed for Constipation.    [DISCONTINUED] tamsulosin (FLOMAX) 0.4 mg Cap Take 1 capsule (0.4 mg total) by mouth once daily.     Family History     None        Tobacco Use    Smoking status: Never Smoker    Smokeless tobacco: Never Used   Substance and Sexual Activity    Alcohol use: No    Drug use: No    Sexual activity: Never     Review of Systems   Unable to perform ROS: Mental status change     Objective:     Vital Signs (Most Recent):  Temp: 98.4 °F (36.9 °C) (03/25/20 0832)  Pulse: 79 (03/25/20 1301)  Resp: (!) 25 (03/25/20 1251)  BP: (!) 146/68 (03/25/20 1301)  SpO2: 99 % (03/25/20 1301) Vital Signs (24h Range):  Temp:  [98.4 °F (36.9 °C)] 98.4 °F (36.9 °C)  Pulse:  [76-88] 79  Resp:  [25-34] 25  SpO2:  [88 %-99 %] 99 %  BP: (118-146)/(58-70) 146/68        There is no height or weight on file to calculate BMI.    Physical Exam   Constitutional: He appears well-developed. No distress.   Elderly male lying in bed on non-rebreather. No acute distress   HENT:   Head: Normocephalic and atraumatic.   Eyes: Conjunctivae are normal.   Neck: Neck supple.   Cardiovascular: Normal rate and normal heart sounds.   Irregularly irregular   Pulmonary/Chest: Effort normal. No respiratory distress. He has no rales.   Coarse breath sounds bilaterally with scattered  rhonchi   Abdominal: Soft. Bowel sounds are normal. He exhibits no distension. There is no tenderness.   Musculoskeletal: He exhibits edema (b/l LE).   Neurological:   Lethargic, does not open eyes or respond   Skin: Skin is warm and dry.   Nursing note and vitals reviewed.          Significant Labs:   CBC:   Recent Labs   Lab 03/25/20  0858   WBC 5.34   HGB 14.9   HCT 48.9   *     CMP:   Recent Labs   Lab 03/25/20  0858   *   K 3.5      CO2 27   GLU 88   BUN 52*   CREATININE 2.7*   CALCIUM 8.5*   PROT 7.0   ALBUMIN 2.8*   BILITOT 1.8*   ALKPHOS 150*   AST 93*   ALT 10   ANIONGAP 16   EGFRNONAA 20*     All pertinent labs within the past 24 hours have been reviewed.    Significant Imaging: I have reviewed all pertinent imaging results/findings within the past 24 hours.

## 2020-03-25 NOTE — ED TRIAGE NOTES
Pt arrived from [facility] via EMS with decrease o2 sats. Pt with wet breath sounds. No s/s of pain/discomfort. Pt arouses to verbal stimuli but unable to express self. Pt o2 say 95% on 15L nonrebreather.

## 2020-03-25 NOTE — ED PROVIDER NOTES
Encounter Date: 3/25/2020    SCRIBE #1 NOTE: I, Ivette Guthrie, am scribing for, and in the presence of, Dr. Victor.       History     Chief Complaint   Patient presents with    Shortness of Breath     PT from [facility], reports SOB since 0700 this AM per EMS     This is a 89 y.o. male who presents with complaint of SOB. Pt was at [facility] per son. They called him to report that pt has been more fatigued and has had a decrease in appetite over the past 24 hrs. Onset of SOB and respiratory distress this morning. No further hx provided given pt unable to cooperate with HPI.    The history is provided by the nursing home and a relative. The history is limited by the condition of the patient.     Review of patient's allergies indicates:   Allergen Reactions    Demerol [meperidine]      Past Medical History:   Diagnosis Date    *Atrial fibrillation     AI (aortic insufficiency)     mild    Atrial fibrillation     Diabetes mellitus     Diabetes mellitus, type 2     Hyperlipemia 11/27/2012    Hyperlipidemia     Hypertension     Memory loss     Parkinson disease     Spinal stenosis memory loss     Past Surgical History:   Procedure Laterality Date    CHOLECYSTECTOMY      OPEN REDUCTION AND INTERNAL FIXATION (ORIF) OF INTERTROCHANTERIC FRACTURE OF FEMUR Left 12/16/2019    Procedure: ORIF, FRACTURE, FEMUR, INTERTROCHANTERIC;  Surgeon: Max Mcclellan MD;  Location: [facility];  Service: Orthopedics;  Laterality: Left;     No family history on file.  Social History     Tobacco Use    Smoking status: Never Smoker    Smokeless tobacco: Never Used   Substance Use Topics    Alcohol use: No    Drug use: No     Review of Systems   Unable to perform ROS: Acuity of condition       Physical Exam     Initial Vitals   BP Pulse Resp Temp SpO2   03/25/20 0832 03/25/20 0832 03/25/20 0901 03/25/20 0832 03/25/20 0836   138/67 81 (!) 33 98.4 °F (36.9 °C) (!) 94 %      MAP       --                Physical  Exam    Nursing note and vitals reviewed.  Constitutional: He appears well-developed and well-nourished. He appears lethargic. Level of Consciousness: Stuporous. He appears ill. He appears distressed.   Somnolent, obese   HENT:   Head: Normocephalic and atraumatic. No head trauma present.   Eyes:   Eyes closed   Cardiovascular: Normal rate.   irreg rhythm   Pulmonary/Chest: Respirations: Spontaneous. Accessory muscle usage present. He is in respiratory distress.   Abdominal: Normal appearance. There is no guarding.   Neurological: He appears lethargic. No sensory deficit. GCS eye subscore is 1. GCS verbal subscore is 2.   Skin: No rash noted.         ED Course   Critical Care  Date/Time: 3/25/2020 9:06 AM  Performed by: Terra Victor MD  Authorized by: Terra Victor MD   Direct patient critical care time: 15 minutes  Additional history critical care time: 8 minutes  Ordering / reviewing critical care time: 8 minutes  Documentation critical care time: 7 minutes  Consulting other physicians critical care time: 7 minutes  Consult with family critical care time: 15 minutes  Total critical care time (exclusive of procedural time) : 60 minutes  Critical care time was exclusive of separately billable procedures and treating other patients and teaching time.  Critical care was necessary to treat or prevent imminent or life-threatening deterioration of the following conditions: respiratory failure.  Critical care was time spent personally by me on the following activities: examination of patient, review of old charts, obtaining history from patient or surrogate, development of treatment plan with patient or surrogate, ordering and performing treatments and interventions, ordering and review of laboratory studies, ordering and review of radiographic studies, evaluation of patient's response to treatment, re-evaluation of patient's condition and pulse oximetry.        Labs Reviewed   CBC W/ AUTO DIFFERENTIAL - Abnormal;  Notable for the following components:       Result Value    Mean Corpuscular Volume 100 (*)     Mean Corpuscular Hemoglobin Conc 30.5 (*)     RDW 15.2 (*)     Platelets 120 (*)     Gran% 79.0 (*)     Lymph% 10.0 (*)     Platelet Estimate Decreased (*)     All other components within normal limits   COMPREHENSIVE METABOLIC PANEL - Abnormal; Notable for the following components:    Sodium 151 (*)     BUN, Bld 52 (*)     Creatinine 2.7 (*)     Calcium 8.5 (*)     Albumin 2.8 (*)     Total Bilirubin 1.8 (*)     Alkaline Phosphatase 150 (*)     AST 93 (*)     eGFR if  23 (*)     eGFR if non  20 (*)     All other components within normal limits   C-REACTIVE PROTEIN - Abnormal; Notable for the following components:    CRP 91.7 (*)     All other components within normal limits   CULTURE, BLOOD   CULTURE, BLOOD   LACTIC ACID, PLASMA   PROCALCITONIN   B-TYPE NATRIURETIC PEPTIDE   B-TYPE NATRIURETIC PEPTIDE   SARS-COV-2 (COVID-19) QUALITATIVE PCR   POCT INFLUENZA A/B MOLECULAR     EKG Readings: (Independently Interpreted)   Initial Reading: No STEMI.   Atrial fibrillation, PVCs present, heart rate 90.     ECG Results          EKG 12-lead (Final result)  Result time 03/25/20 12:56:36    Final result by Interface, Lab In [facility] (03/25/20 12:56:36)                 Narrative:    Test Reason : R06.03,    Vent. Rate : 090 BPM     Atrial Rate : 117 BPM     P-R Int : 000 ms          QRS Dur : 106 ms      QT Int : 352 ms       P-R-T Axes : 000 -43 134 degrees     QTc Int : 430 ms    Atrial fibrillation with premature ventricular or aberrantly conducted  complexes , electronic ventricular pacing.  Left axis deviation  Nonspecific T wave abnormality  Abnormal ECG    Confirmed by Doug Choi MD ([id number]) on 3/25/2020 12:56:21 PM    Referred By: [email or web address]   SELF           Confirmed By:Doug Choi MD                            Imaging Results          X-Ray Chest AP Portable (Final result)  Result  time 03/25/20 10:12:13    Final result by Grey Woodruff Jr., MD (03/25/20 10:12:13)                 Impression:      There is cardiomegaly increase in the pulmonary vascular and interstitial markings as well as a right pleural effusion.  While this may represent infectious etiology congestive heart failure is considered more likely.      Electronically signed by: Grey Woodruff MD  Date:    03/25/2020  Time:    10:12             Narrative:    EXAMINATION:  XR CHEST AP PORTABLE    CLINICAL HISTORY:  Sepsis;    TECHNIQUE:  Single frontal view of the chest was performed.    COMPARISON:  April 2019.    FINDINGS:  Single lead pacemaker wire monitoring leads noted.  Heart is enlarged.  Increase in the pulmonary vascular markings.  There is patchy increase in interstitial alveolar markings and a right pleural effusion.  No pneumothorax.                              X-Rays:   Independently Interpreted Readings:   Chest X-Ray: AICD in place, no cardiomegaly, diffuse increased interstitial markings, questionable right side pleural effusion.         Medical Decision Making:   History:   Old Medical Records: I decided to obtain old medical records.  Initial Assessment:   Urgent evaluation of 89-year-old gentleman with atrial fibrillation status post pacemaker, thrombocytopenia, diabetes, Parkinson's, CKD, hypertension with subacute hip fracture, currently residing at [facility] sent here with increasing confusion, decreased appetite and resp distress.  Currently patient is somnolent but arousable, intermittently moaning, but not opening eyes on command, appears frail, chronically ill, vital signs notable for hypoxia.  Suspect pneumonia, fluid overload, worsening renal insufficiency, with high suspicion for possible covid like illness.  I had a lengthy discussion with the patient's son Jesus and discussed his prognosis and agreed that further airway intervention would be unlikely to benefit him at this time given his  comorbidities.     Independently Interpreted Test(s):   I have ordered and independently interpreted X-rays - see prior notes.  I have ordered and independently interpreted EKG Reading(s) - see prior notes  Clinical Tests:   Lab Tests: Ordered and Reviewed  Radiological Study: Ordered and Reviewed  Medical Tests: Ordered and Reviewed  ED Management:  Labs notable for hypernatremia, hemoconcentration, and elevated creatinine consistent with dehydration, chest x-ray with increased vascular marking concerning for possible COVID given potential exposure at nursing home. Anxiolysis, opioids for breathlessness, small amt IVF, and admit for transition of care.            Scribe Attestation:   Scribe #1: I performed the above scribed service and the documentation accurately describes the services I performed. I attest to the accuracy of the note.    Attending Attestation:           Physician Attestation for Scribe:  Physician Attestation Statement for Scribe #1: I, Dr. Victor, reviewed documentation, as scribed by Ivette Guthrie in my presence, and it is both accurate and complete.                 ED Course as of Mar 25 1548   Wed Mar 25, 2020   0850 Extensive conversation with patient's son at [phone]. He is currently driving from [place] and understand that his father is gravely ill. He and his siblings have been anticipating this. Further invasive interventions will not benefit. His son has medical power of  and agrees focus on comfort will be ideal at this time.    [AC]   1222 Paged hospitalist     [AC]   2316 Case discussed with Dr. Little. He will admit patient and place admission orders.    [AC]      ED Course User Index  [AC] Ivette Guthrie                Clinical Impression:     1. Respiratory distress    2. ENZO (acute kidney injury)    3. Hypernatremia    4. Suspected Covid-19 Virus Infection          Disposition:   Disposition: Admitted  Condition: Serious     ED Disposition Condition    Admit                            Terra Victor MD  03/25/20 1542

## 2020-03-25 NOTE — HPI
89 y/o M with history of A. Fib s/p st. Sven Pacemaker insertion, thrombocytopenia, NIDDM-2, diabatic neuropathy, Parkinson's Disease, CKD-II and HTN presents to the ED via EMS from [facility] for dyspnea. History is obtained entirely from chart and discussion with ED staff since patient is unable to provide history. The patient reportedly has been feeling fatigued with decreased appetite over since yesterday. Today he developed dyspnea associated with respiratory distress so was sent to the ED. There are several known COVID-19 positive patients from [facility]. Dr. Victor discussed with patient's son and decided on comfort measures.

## 2020-03-25 NOTE — PLAN OF CARE
Spoke with Dr. Carlson regarding inpatient hospice vs home hospice at [facility].  Okay to arrange for hospice disposition from ED with home hospice;      Discharge planner to contact family; awaiting hospice orders.

## 2020-03-25 NOTE — NURSING
Received pt from ED. Pt transferred from stretcher to bed. VSS on 15L NRB. Respirations even but labored with accessory muscle use noted. Repositioned and head of bed elevated. Pt AAO x1, not able to verbally communicate, only moaning and grunting, unsure of pts baseline at this time. Unable to complete assessment due to orientation. Condom catheter placed. DTI noted to sacrum, notified YAQUELIN Cordova, OC who will further assess with WOC. Wound care consult placed and spoke with YAQUELIN Portillo, WOC. Bed low and locked call light within reach, side rails up x3. Will continue to monitor.

## 2020-03-25 NOTE — ASSESSMENT & PLAN NOTE
Baseline Cr around 1.3, now up to 2.7  ENZO due to ATN has been seen with COVID-19 patients  Continue comfort measures

## 2020-03-25 NOTE — PROGRESS NOTES
Consulted to see for sacral DTI by nursing staff  89 y/o M with history of A. Fib s/p St. Sven Pacemaker insertion, thrombocytopenia, NIDDM-2, diabatic neuropathy, Parkinson's Disease, CKD-II and HTN presents to the ED via EMS from [facility] for dyspnea. History is obtained entirely from chart and discussion with ED staff since patient is unable to provide history. The patient reportedly has been feeling fatigued with decreased appetite over since yesterday. Today he developed dyspnea associated with respiratory distress so was sent to the ED. There are several known COVID-19 positive patients from [facility]. MD discussed with patient's son and decided on comfort measures.          Feel this wound may be related to skin changes at [facility]   Presents as a 62i70yr purple blistered area which may further manifest. As this wound is in evolution , we are unable to fully stage the extent of the tissue damage at this time.   Also, noted linear red non blanching area to left lower buttock measuring 10x4cm.  Will apply Aquacel foam to both areas of concern. Heels are clear but prevention measures with adhesive foam and heel offloading boots will be applied. Nursing staff applied a mattress overlay to his bed for pressure reduction.   Additional HAPI prevention will be put into place.   Wearing condom catheter for urinary containment.      03/25/20 1500        Pressure Injury 03/25/20 1430 medial Sacral spine DTPI   Date First Assessed/Time First Assessed: 03/25/20 1430   Pressure Injury Present on Admission: yes  Orientation: medial  Location: Sacral spine  Is this injury device related?: No  Staging: (c) DTPI   Wound Image    Staging DTPI   Dressing Appearance Open to air;No dressing   Drainage Amount None   Drainage Characteristics/Odor No odor   Appearance Purple;Blistered   Periwound Area Intact   Wound Length (cm) 10 cm   Wound Width (cm) 12 cm   Wound Surface Area (cm^2) 120 cm^2   Care Cleansed  with:;Sterile normal saline   Dressing Applied;Foam        Pressure Injury 03/25/20 1430 Left lower Buttocks Stage 1   Date First Assessed/Time First Assessed: 03/25/20 1430   Pressure Injury Present on Admission: yes  Side: Left  Orientation: lower  Location: Buttocks  Is this injury device related?: (c)   Staging: Stage 1   Wound Image    Staging Stage 1   Dressing Appearance Open to air;No dressing   Drainage Amount None   Drainage Characteristics/Odor No odor   Appearance [place];Red   Periwound Area Intact   Wound Length (cm) 10 cm   Wound Width (cm) 4 cm   Wound Surface Area (cm^2) 40 cm^2     Bandar Santana RN CWON

## 2020-03-25 NOTE — PLAN OF CARE
Pt remained free of falls and injuries throughout shift. Disoriented x4. IV's flushed and saline locked. Not able to verbally communicate. Bedfast. Overlay placed on bed. Heels floated on pillows, heel boots not available at this time, will check back tomorrow. VSS on 31% 6L per VM. DTI and stage 1 to buttocks. Suction at bedside. Isolation precautions initiated and maintained- contact/airborne/droplet. Bed low and locked, call light within reach, side rails up X3 Will continue to monitor.

## 2020-03-25 NOTE — PLAN OF CARE
[facility]  Department of Hospital Medicine  [address] [place] [address]  [phone] after hours  [phone] fax    HOSPICE  ORDERS    03/25/2020    Admit to Hospice:  [facility]     Diagnoses:   Active Hospital Problems    Diagnosis  POA    Palliative care encounter [Z51.5]  Not Applicable    Viral pneumonia [J12.9]  Yes    Covid-19 Virus Infection [J22, B97.29]  Yes    Respiratory distress [R06.03]  Yes      Resolved Hospital Problems   No resolved problems to display.       Hospice Qualifying Diagnoses:        Patient has a life expectancy < 6 months due to:  1) Primary Hospice Diagnosis: acute respiratory failure  2) Comorbid Conditions Contributing to Decline: suspected COVID-19 infection, chronic kidney disease, Parkinson's disease, type 2 diabetes mellitus    Vital Signs: Routine per Hospice Protocol.    Code Status: DNR    Allergies:   Review of patient's allergies indicates:   Allergen Reactions    Demerol [meperidine]        Diet: comfort feeding as tolerated    Activities: As tolerated    Nursing: Per Hospice Routine.     Routine Skin for Bedridden Patients: Apply moisture barrier cream to all skin folds and   wet areas in perineal area daily and after baths and all bowel movements.      Oxygen: Oxygen protocol for comfort    Medications:    Silvano Parmar   Home Medication Instructions LINA:[id number]    Printed on:03/25/20 1311   Medication Information                      morphine 10 mg/5 mL solution  Take 2.5 mLs (5 mg total) by mouth every 2 (two) hours as needed for Pain (pain level 1-2 of 10 or dyspnea).                   Future Orders:  Hospice Medical Director may dictate new orders for comfortable care measures & sign death certificate.        _________________________________  Lin Lugo MD  03/25/2020

## 2020-03-25 NOTE — H&P
[facility] Hospital Medicine  History & Physical    Patient Name: Silvano Parmar  MRN: [id number]  Admission Date: 3/25/2020  Attending Physician: Lin Lugo MD   Primary Care Provider: Silvestre Ramirez MD         Patient information was obtained from past medical records and ER records.     Subjective:     Principal Problem:Acute respiratory failure with hypoxia    Chief Complaint:   Chief Complaint   Patient presents with    Shortness of Breath     PT from [place], reports SOB since 0700 this AM per EMS        HPI: 89 y/o M with history of A. Fib s/p st. Sven Pacemaker insertion, thrombocytopenia, NIDDM-2, diabatic neuropathy, Parkinson's Disease, CKD-II and HTN presents to the ED via EMS from [place] [facility] for dyspnea. History is obtained entirely from chart and discussion with ED staff since patient is unable to provide history. The patient reportedly has been feeling fatigued with decreased appetite over since yesterday. Today he developed dyspnea associated with respiratory distress so was sent to the ED. There are several known COVID-19 positive patients from [place] [facility]. Dr. Victor discussed with patient's son and decided on comfort measures.      Past Medical History:   Diagnosis Date    *Atrial fibrillation     AI (aortic insufficiency)     mild    Atrial fibrillation     Diabetes mellitus     Diabetes mellitus, type 2     Hyperlipemia 11/27/2012    Hyperlipidemia     Hypertension     Memory loss     Parkinson disease     Spinal stenosis memory loss       Past Surgical History:   Procedure Laterality Date    CHOLECYSTECTOMY      OPEN REDUCTION AND INTERNAL FIXATION (ORIF) OF INTERTROCHANTERIC FRACTURE OF FEMUR Left 12/16/2019    Procedure: ORIF, FRACTURE, FEMUR, INTERTROCHANTERIC;  Surgeon: Max Mcclelaln MD;  Location: [facility];  Service: Orthopedics;  Laterality: Left;       Review of patient's allergies indicates:   Allergen Reactions    Demerol  [meperidine]        No current facility-administered medications on file prior to encounter.      Current Outpatient Medications on File Prior to Encounter   Medication Sig    [DISCONTINUED] acetaminophen (TYLENOL) 325 mg Cap Take by mouth.    [DISCONTINUED] aspirin 81 MG Chew Take 1 tablet (81 mg total) by mouth 2 (two) times daily.    [DISCONTINUED] carbidopa-levodopa  mg (SINEMET)  mg per tablet Take 2 tablets by mouth 3 (three) times daily.     [DISCONTINUED] cholecalciferol, vitamin D3, (VITAMIN D3) 4,000 unit Cap Take 1 capsule by mouth once daily.     [DISCONTINUED] diltiaZEM (CARDIZEM CD) 120 MG Cp24 TAKE ONE CAPSULE BY MOUTH ONCE DAILY    [DISCONTINUED] esomeprazole (NEXIUM) 40 MG capsule Take 22.3 mg by mouth before breakfast.     [DISCONTINUED] fluticasone (FLONASE) 50 mcg/actuation nasal spray 1 spray (50 mcg total) by Each Nare route once daily.    [DISCONTINUED] FOLIC ACID/MULTIVITS-MIN/LUT (CENTRUM SILVER ORAL) Take 1 tablet by mouth once daily.    [DISCONTINUED] furosemide (LASIX) 20 MG tablet TAKE 1 TABLET BY MOUTH EVERY DAY    [DISCONTINUED] gabapentin (NEURONTIN) 300 MG capsule TAKE ONE CAPSULE BY MOUTH TWICE DAILY    [DISCONTINUED] GENERLAC 10 gram/15 mL solution Take 10 g by mouth daily as needed.     [DISCONTINUED] guaifenesin 100 mg/5 ml (ROBITUSSIN) 100 mg/5 mL syrup Take 200 mg by mouth every 4 (four) hours as needed for Cough.    [DISCONTINUED] JANUVIA 50 mg Tab TAKE ONE TABLET BY MOUTH ONCE DAILY    [DISCONTINUED] losartan (COZAAR) 50 MG tablet TAKE ONE TABLET BY MOUTH ONCE DAILY    [DISCONTINUED] mirabegron (MYRBETRIQ) 50 mg Tb24 Take 1 tablet (50 mg total) by mouth once daily.    [DISCONTINUED] pramipexole (MIRAPEX) 1 MG tablet Take 1 tablet by mouth 3 (three) times daily.    [DISCONTINUED] senna-docusate 8.6-50 mg (PERICOLACE) 8.6-50 mg per tablet Take 1 tablet by mouth 2 (two) times daily as needed for Constipation.    [DISCONTINUED] tamsulosin (FLOMAX)  0.4 mg Cap Take 1 capsule (0.4 mg total) by mouth once daily.     Family History     None        Tobacco Use    Smoking status: Never Smoker    Smokeless tobacco: Never Used   Substance and Sexual Activity    Alcohol use: No    Drug use: No    Sexual activity: Never     Review of Systems   Unable to perform ROS: Mental status change     Objective:     Vital Signs (Most Recent):  Temp: 98.4 °F (36.9 °C) (03/25/20 0832)  Pulse: 79 (03/25/20 1301)  Resp: (!) 25 (03/25/20 1251)  BP: (!) 146/68 (03/25/20 1301)  SpO2: 99 % (03/25/20 1301) Vital Signs (24h Range):  Temp:  [98.4 °F (36.9 °C)] 98.4 °F (36.9 °C)  Pulse:  [76-88] 79  Resp:  [25-34] 25  SpO2:  [88 %-99 %] 99 %  BP: (118-146)/(58-70) 146/68        There is no height or weight on file to calculate BMI.    Physical Exam   Constitutional: He appears well-developed. No distress.   Elderly male lying in bed on non-rebreather. No acute distress   HENT:   Head: Normocephalic and atraumatic.   Eyes: Conjunctivae are normal.   Neck: Neck supple.   Cardiovascular: Normal rate and normal heart sounds.   Irregularly irregular   Pulmonary/Chest: Effort normal. No respiratory distress. He has no rales.   Coarse breath sounds bilaterally with scattered rhonchi   Abdominal: Soft. Bowel sounds are normal. He exhibits no distension. There is no tenderness.   Musculoskeletal: He exhibits edema (b/l LE).   Neurological:   Lethargic, does not open eyes or respond   Skin: Skin is warm and dry.   Nursing note and vitals reviewed.          Significant Labs:   CBC:   Recent Labs   Lab 03/25/20  0858   WBC 5.34   HGB 14.9   HCT 48.9   *     CMP:   Recent Labs   Lab 03/25/20  0858   *   K 3.5      CO2 27   GLU 88   BUN 52*   CREATININE 2.7*   CALCIUM 8.5*   PROT 7.0   ALBUMIN 2.8*   BILITOT 1.8*   ALKPHOS 150*   AST 93*   ALT 10   ANIONGAP 16   EGFRNONAA 20*     All pertinent labs within the past 24 hours have been reviewed.    Significant Imaging: I have reviewed  all pertinent imaging results/findings within the past 24 hours.    Assessment/Plan:     * Acute respiratory failure with hypoxia  Patient presented with respiratory distress and hypoxia  Improved on non-rebreather  Likely positive COVID-19 contacts at [facility]  CXR with increased vascular and interstitial markings concerning for CHF but BNP WNL  Patient's son and MPOA want comfort measures given advanced age and comorbidities  CM working on hospice care      Suspected Covid-19 Virus Infection  COVID-19 pending  CRP 91  Airborne, contact and droplet precautions       Acute on chronic renal insufficiency  Baseline Cr around 1.3, now up to 2.7  ENZO due to ATN has been seen with COVID-19 patients  Continue comfort measures      Chronic idiopathic thrombocytopenia  Chronic, stable      Type 2 diabetes mellitus, without long-term current use of insulin  Hold glucose checks for comfort      Parkinson disease  Hold home meds due to mental status change  Comfort measures      Persistent atrial fibrillation  S/p pacemaker placement        VTE Risk Mitigation (From admission, onward)    None             Lin Lugo MD  Department of Hospital Medicine   [facility]

## 2020-03-25 NOTE — PLAN OF CARE
Hospice care  Await for family to make final decisions  Family wants to discuss with Marco Ramirez PCP  Son/poa stated he will me back in a hour or two  Addendum  Family and Dr Ramirez want Covid results before making a final decision.  Will follow closely       03/25/20 9737   Discharge Assessment   Assessment Type Discharge Planning Assessment   Confirmed/corrected address and phone number on facesheet? Yes   Assessment information obtained from? Patient;Caregiver;Medical Record   Communicated expected length of stay with patient/caregiver yes   Current cognitive status: Coma/Sedated/Intubated   Lives With facility resident   Able to Return to Prior Arrangements other (see comments)   Patient currently being followed by outpatient case management? No   Patient currently receives any other outside agency services? No   Discharge Plan A Hospice/home

## 2020-03-25 NOTE — ASSESSMENT & PLAN NOTE
Patient presented with respiratory distress and hypoxia  Improved on non-rebreather  Likely positive COVID-19 contacts at [facility]  CXR with increased vascular and interstitial markings concerning for CHF but BNP WNL  Patient's son and MPOA want comfort measures given advanced age and comorbidities  CM working on hospice care

## 2020-03-25 NOTE — ED NOTES
Rounding on pt performed. Comfort provided. Repositioned pt. No s/s of pain/discomfort or resp distress. Monitoring continues.

## 2020-03-25 NOTE — NURSING
Pt titrated from 15L NRB to 31% at 6L VM per YAQUELIN Cordova, OC. Pt tolerating well and does not appear to be in any distress. Will continue to monitor.

## 2020-03-26 VITALS
RESPIRATION RATE: 22 BRPM | SYSTOLIC BLOOD PRESSURE: 155 MMHG | DIASTOLIC BLOOD PRESSURE: 75 MMHG | OXYGEN SATURATION: 95 % | HEART RATE: 87 BPM | TEMPERATURE: 97 F

## 2020-03-26 LAB — SARS-COV-2 RNA RESP QL NAA+PROBE: DETECTED

## 2020-03-26 PROCEDURE — 63600175 PHARM REV CODE 636 W HCPCS: Performed by: INTERNAL MEDICINE

## 2020-03-26 PROCEDURE — 99238 HOSP IP/OBS DSCHRG MGMT 30/<: CPT | Mod: ,,, | Performed by: INTERNAL MEDICINE

## 2020-03-26 PROCEDURE — 27000221 HC OXYGEN, UP TO 24 HOURS

## 2020-03-26 PROCEDURE — 94761 N-INVAS EAR/PLS OXIMETRY MLT: CPT

## 2020-03-26 PROCEDURE — 99238 PR HOSPITAL DISCHARGE DAY,<30 MIN: ICD-10-PCS | Mod: ,,, | Performed by: INTERNAL MEDICINE

## 2020-03-26 RX ADMIN — LORAZEPAM 0.5 MG: 2 INJECTION INTRAMUSCULAR at 05:03

## 2020-03-26 RX ADMIN — LORAZEPAM 0.5 MG: 2 INJECTION INTRAMUSCULAR at 07:03

## 2020-03-26 RX ADMIN — LORAZEPAM 1 MG: 2 INJECTION INTRAMUSCULAR; INTRAVENOUS at 10:03

## 2020-03-26 NOTE — NURSING
Attempted to call report to [facility] and left a voicemail. Will retry shortly.

## 2020-03-26 NOTE — NURSING
I, Laura Galloway LPN, am scribing for Lew Park LPN. Assessment documented by myself and performed by Lew Park LPN.

## 2020-03-26 NOTE — PLAN OF CARE
POC reviewed with pt who verbalized understanding. AAOx1. VSS on 4L venti mask. PAtient has to frequently be reminded to keep mask on. Tremor noted. No complaints of pain or nausea. Condom cath in place. Heels elevated. Turned with wedge. Oral care provided. Cough noted, suction at bedside but not able to get anything up orally. Remains free of falls and injury. No acute events. No distress noted. Resting between care. COVID-19 isolation in place, PPE used as recommended. Will continue to monitor.

## 2020-03-26 NOTE — PLAN OF CARE
[facility]  Department of Hospital Medicine  [address] [place] [address]  [phone] after hours  [phone] fax    HOSPICE  ORDERS    03/26/2020    Admit to Hospice:   Inpatient Service      Diagnoses:   Active Hospital Problems    Diagnosis  POA    *Acute respiratory failure with hypoxia [J96.01]  Yes    Suspected Covid-19 Virus Infection [R68.89]  Yes    Acute on chronic renal insufficiency [N28.9, N18.9]  Yes    Chronic idiopathic thrombocytopenia [D69.3]  Yes    Type 2 diabetes mellitus, without long-term current use of insulin [E11.9]  Yes     Chronic    Persistent atrial fibrillation [I48.19]  Yes    Parkinson disease [G20]  Yes     Chronic      Resolved Hospital Problems   No resolved problems to display.       Hospice Qualifying Diagnoses:        Patient has a life expectancy < 6 months due to:  1) Primary Hospice Diagnosis: acute respiratory failure  2) Comorbid Conditions Contributing to Decline: acute kidney injury, hypernatremia, suspected COVID-19    Vital Signs: Routine per Hospice Protocol.    Code Status: DNR    Allergies:   Review of patient's allergies indicates:   Allergen Reactions    Demerol [meperidine]        Diet: NPO with sips of liquids as tolerated    Activities: As tolerated    Nursing: Per Hospice Routine.      Routine Skin for Bedridden Patients: Apply moisture barrier cream to all skin folds and   wet areas in perineal area daily and after baths and all bowel movements.      Oxygen: 2-4 L nasal cannula as needed    Medications:   Silvano Parmar   Home Medication Instructions LINA:[id number]    Printed on:03/26/20 1257   Medication Information                      morphine 10 mg/5 mL solution  Take 2.5 mLs (5 mg total) by mouth every 2 (two) hours as needed for Pain (pain level 1-2 of 10 or dyspnea).                 Future Orders:  Hospice Medical Director may dictate new orders for comfortable care measures & sign  death certificate.        _________________________________  Lin Lugo MD  03/26/2020

## 2020-03-26 NOTE — PLAN OF CARE
D/C  Plan:  Return to [facility]  [facility] to follow  [facility] does not have access to [facility]  [facility] , [phone], fax [phone]  Family is aware of plan  Await confirmation of hospice consents  Anticipate transfer back to [facility] between 5 p and 6 p today  Report [phone] or [phone] [facility]  ADT 30 completed at 4 10 p        03/26/20 1319   Final Note   Assessment Type Final Discharge Note   Anticipated Discharge Disposition [facility]   Post-Acute Status   Post-Acute Authorization Placement   Post-Acute Placement Status Referrals Sent  (return to [facility])

## 2020-03-26 NOTE — NURSING
Attempted to call report X4. Karolyn, Supervisor notified of inability to update [facility]'s staff.  Karolyn stated she will have them call right away. Will continue to monitor.

## 2020-03-26 NOTE — PLAN OF CARE
Sent hospice orders and supporting documentation to Paulette with [facility] via [facility]; confirmed fax received; awaiting on nurse review for admission     1356- Per Paulette, patient admitted for hospice care at [facility]; Personnel to deliver equipment to [facility] no later than 4pm today. Updated NANCI Grossman and patient's nurse Lew

## 2020-03-26 NOTE — NURSING
Report given to Love with [facility]. No questions at this time. IV removed with catheter intact. Pts incontinence pads changed. Heels elevated. Pt turned to right side with wedge. Bed low and locked, bed alarm set. Isolation precautions maintained. [facility]. Will continue to monitor.

## 2020-03-26 NOTE — NURSING
Attempted to feed pt clear liquid diet. Pt able to drink from a straw without difficulty. Unable to eat from a spoon or fork at this time, does not follow commands to swallow bites. Will continue to monitor.

## 2020-03-26 NOTE — HOSPITAL COURSE
Patient was admitted for acute onset dyspnea. Family did not want him to be intubated. He has suspected COVID-19 but test is still pending. He was started on comfort measures and transferred back to [facility] on hospice.

## 2020-03-26 NOTE — DISCHARGE SUMMARY
[facility] Hospital Medicine  Discharge Summary      Patient Name: Silvano Parmar  MRN: [id number]  Admission Date: 3/25/2020  Hospital Length of Stay: 1 days  Discharge Date and Time:  03/26/2020 12:56 PM  Attending Physician: Lin Lugo MD   Discharging Provider: Lin Lugo MD  Primary Care Provider: Silvestre Ramirez MD      HPI:   89 y/o M with history of A. Fib s/p st. Sven Pacemaker insertion, thrombocytopenia, NIDDM-2, diabatic neuropathy, Parkinson's Disease, CKD-II and HTN presents to the ED via EMS from [facility] for dyspnea. History is obtained entirely from chart and discussion with ED staff since patient is unable to provide history. The patient reportedly has been feeling fatigued with decreased appetite over since yesterday. Today he developed dyspnea associated with respiratory distress so was sent to the ED. There are several known COVID-19 positive patients from [facility]. Dr. Victor discussed with patient's son and decided on comfort measures.      * No surgery found *      Hospital Course:   Patient was admitted for acute onset dyspnea. Family did not want him to be intubated. He has suspected COVID-19 but test is still pending. He was started on comfort measures and transferred back to [facility] on hospice.     Consults:   Consults (From admission, onward)        Status Ordering Provider     Inpatient consult to Social Work  Once     Provider:  (Not yet assigned)    Completed JCARLOS ROSARIO          No new Assessment & Plan notes have been filed under this hospital service since the last note was generated.  Service: Hospital Medicine    Final Active Diagnoses:    Diagnosis Date Noted POA    PRINCIPAL PROBLEM:  Acute respiratory failure with hypoxia [J96.01] 03/25/2020 Yes    Suspected Covid-19 Virus Infection [R68.89] 03/25/2020 Yes    Acute on chronic renal insufficiency [N28.9, N18.9] 12/15/2019 Yes    Chronic idiopathic thrombocytopenia  [D69.3] 11/09/2018 Yes    Type 2 diabetes mellitus, without long-term current use of insulin [E11.9] 05/28/2013 Yes     Chronic    Persistent atrial fibrillation [I48.19] 11/27/2012 Yes    Parkinson disease [G20] 11/27/2012 Yes     Chronic      Problems Resolved During this Admission:       Discharged Condition: poor    Disposition: Home or Self Care    Follow Up:    Patient Instructions:      Ambulatory referral/consult to Outpatient Case Management   Referral Priority: Routine Referral Type: Consultation   Referral Reason: Specialty Services Required   Number of Visits Requested: 1     Diet NPO   Order Comments: Liquids for comfort if able     Activity as tolerated       Significant Diagnostic Studies: Labs:   BMP:   Recent Labs   Lab 03/25/20  0858   GLU 88   *   K 3.5      CO2 27   BUN 52*   CREATININE 2.7*   CALCIUM 8.5*   , CBC   Recent Labs   Lab 03/25/20  0858   WBC 5.34   HGB 14.9   HCT 48.9   *    and All labs within the past 24 hours have been reviewed    Pending Diagnostic Studies:     Procedure Component Value Units Date/Time    SARS- CoV-2 (COVID-19) QUALITATIVE PCR [[id number]] Collected:  03/25/20 0856    Order Status:  Sent Lab Status:  In process Updated:  03/25/20 1123    Specimen:  Nasopharyngeal          Medications:  Reconciled Home Medications:      Medication List      START taking these medications    morphine 10 mg/5 mL solution  Take 2.5 mLs (5 mg total) by mouth every 2 (two) hours as needed for Pain (pain level 1-2 of 10 or dyspnea).        STOP taking these medications    aspirin 81 MG Chew     carbidopa-levodopa  mg  mg per tablet  Commonly known as:  SINEMET     CENTRUM SILVER ORAL     diltiaZEM 120 MG Cp24  Commonly known as:  CARDIZEM CD     esomeprazole 40 MG capsule  Commonly known as:  NEXIUM     fluticasone propionate 50 mcg/actuation nasal spray  Commonly known as:  FLONASE     furosemide 20 MG tablet  Commonly known as:  LASIX     gabapentin  300 MG capsule  Commonly known as:  NEURONTIN     GENERLAC 10 gram/15 mL solution  Generic drug:  lactulose     guaifenesin 100 mg/5 ml 100 mg/5 mL syrup  Commonly known as:  ROBITUSSIN     JANUVIA 50 MG Tab  Generic drug:  SITagliptin     losartan 50 MG tablet  Commonly known as:  COZAAR     mirabegron 50 mg Tb24  Commonly known as:  MYRBETRIQ     pramipexole 1 MG tablet  Commonly known as:  MIRAPEX     senna-docusate 8.6-50 mg 8.6-50 mg per tablet  Commonly known as:  PERICOLACE     tamsulosin 0.4 mg Cap  Commonly known as:  FLOMAX     TylenoL 325 mg Cap  Generic drug:  acetaminophen     VITAMIN D3 4,000 unit Cap  Generic drug:  cholecalciferol (vitamin D3)            Indwelling Lines/Drains at time of discharge:   Lines/Drains/Airways     Drain            Male External Urinary Catheter 03/25/20 2117 less than 1 day                Time spent on the discharge of patient: 20 minutes  Patient was seen and examined on the date of discharge and determined to be suitable for discharge.         Lin Lugo MD  Department of Hospital Medicine  [facility]

## 2020-03-27 NOTE — PROGRESS NOTES
Rosa here to  patient.  Paperwork given to Rosa. [facility]

## 2020-03-30 ENCOUNTER — OUTPATIENT CASE MANAGEMENT (OUTPATIENT)
Dept: ADMINISTRATIVE | Facility: OTHER | Age: 85
End: 2020-03-30

## 2020-03-30 LAB
BACTERIA BLD CULT: NORMAL
BACTERIA BLD CULT: NORMAL

## 2020-03-30 NOTE — PHYSICIAN QUERY
PT Name: Silvano Parmar  MR #: [id number]  Physician Query Form - Renal Condition Clarification     CDS: Elo VALE RN  Contact information: [email or web address]  Phone number: [phone]    This form is a permanent document in the medical record.     QueryDate: March 30, 2020    By submitting this query, we are merely seeking further clarification of documentation. Please utilize your independent clinical judgment when addressing the question(s) below.    The Medical record contains the following:   Indicator Supporting Clinical Findings Location in Medical Record   X Kidney (Renal) Insufficiency Acute on chronic renal insufficiency  Baseline Cr around 1.3, now up to 2.7  ENZO due to ATN has been seen with COVID-19 patients  Continue comfort measures    Suspect pneumonia, fluid overload, worsening renal insufficiency, with high suspicion for possible covid like illness. H&P Hosp Med 3/25/20          ED Prov Note 3/25/20     X Kidney (Renal) Failure / Injury Clinical Impression:  1. Respiratory distress   2. ENZO (acute kidney injury)   3. Hypernatremia   4. Suspected Covid-19 Virus Infection  ED Prov Note 3/25/20    Nephrotoxic Agents     X BUN/Creatinine GFR BUN/CR= 28/0.9  GFR= >60    BUN/CR= 52/2.7  GFR= 20 ! Labs 12/18/2019      Labs 3/25/2020    Urine: Casts         Eosinophils     X Dehydration Suspect pneumonia, fluid overload, worsening renal insufficiency, with high suspicion for possible covid like illness. ED Prov Note 3/25/20    Nausea/Vomiting      Dialysis/CRRT      Treatment:     X Other:   89 y/o M with history of A. Fib s/p st. Sven Pacemaker insertion, thrombocytopenia, NIDDM-2, diabatic neuropathy, Parkinson's Disease, CKD-II and HTN presents to the ED via EMS from [facility] for dyspnea. History is obtained entirely from chart and discussion with ED staff since patient is unable to provide history. The patient reportedly has been feeling fatigued with decreased appetite over since  yesterday. Today he developed dyspnea associated with respiratory distress so was sent to the ED. H&P Hosp Med 3/25/20   Acute Kidney Injury / Acute Renal Failure has different defining criteria. A generally accepted guideline  is:   A greater than 100% (2X) rise in serum creatinine from baseline* occurring during the course of a single hospital stay.   *Baseline as determined by the providers judgment and consideration of previous lab values and other documentation, if available.    A diagnosis of Acute Kidney Injury/ Acute Renal Failure should incorporate abnormal labs and clinical findings that are clinically significant      References: 1. Ángel et al. Acute renal failure-definition, outcome measures, animal models, fluid therapy and information technology needs: the Second International Consensus Conference of the Acute Dialysis Quality Initiative (ADQI) Group. Crit Care 2004; 8:B204; 2. Fernando et al. Acute Kidney Injury Network: report of an initiative to improve outcomes in acute kidney injury. Crit Care 2007; 11:R31; 3. Kidney Disease: Improving Global Outcomes (KDIGO). Acute Kidney Injury Work Group. KDIGO clinical practice guidelines for acute kidney injury. Kidney Int Suppl 2012; 2:1.    The clinical guidelines noted below is only a system guideline, it does not replace the providers clinical judgment.    Provider, please specify the diagnosis or diagnoses associated with above clinical findings.    Doctor, please further specify the diagnosis of Acute on chronic renal insufficiency. Please select all that apply    [   ] Acute Kidney Failure/Injury with Acute Cortical Necrosis  Rare; usually caused by significantly diminished renal arterial perfusion secondary to vascular spasm, microvascular injury, or intravascular coagulation       [   ] Acute Kidney Failure/Injury with Tubular Necrosis  Damage to the tubule cells of the kidney. Common triggers: shock, hypotension, IV contrast, rhabdomyolysis,  medications   [   ] Other Acute Kidney Failure/Injury (please specify): ____________     [ x  ] Unspecified Acute Kidney Failure/Injury      [   ] Chronic Kidney Disease (CKD) stage 1   Slight kidney damage with normal or increased filtration eGFR 90+   [   ] Chronic Kidney Disease (CKD) stage 2   Mildly reduced kidney function eGFR 60-89   [   ] Chronic Kidney Disease (CKD) stage 3   Moderately reduced kidney function eGFR 30-59    [   ] Chronic Kidney Disease (CKD) stage 4  Severely reduced kidney function eGFR 15-29   [   ] Chronic Kidney Disease (CKD) stage 5 Kidney failure, requiring transplant or dialysis eGFR <15   [   ] Other (please specify): _________________________________   [   ]  Clinically Undetermined       Please document in your progress notes daily for the duration of treatment until resolved and include in your discharge summary.

## 2020-03-30 NOTE — PROGRESS NOTES
Outpatient Care Management  Patient Not Qualified    Patient: Silvano Parmar  MRN:  [id number]  Date of Service:  3/30/2020  Completed by:  Deya Thacker RN    Chief Complaint   Patient presents with    OPCM Chart Review     3/30/20    Case Closure     3/30/20       Patient Summary     Program:  OPCM High Risk  Qualification Status:  No  Reason Not Qualified:  Resides in Nursing Home     Patient discharged back to [facility] with [facility].     Closing case.   Deya Thacker RN  Outpatient

## 2020-03-30 NOTE — PHYSICIAN QUERY
PT Name: Silvano Parmar  MR #: [id number]    Physician Query Form - Consultant Diagnosis Clarification     CDS: Elo VALE RN  Contact information: [email or web address]  Phone number: [phone]  This form is a permanent document in the medical record.     Query Date: March 30, 2020    By submitting this query, we are merely seeking further clarification of documentation.  Please utilize your independent clinical judgment when addressing the question(s) below.    The Medical record contains the following:   Diagnosis Supporting Clinical Information Location in Medical Record   Pressure Injury Present on Admission: yes  Orientation: medial  Location: Sacral spine  Is this injury device related?: No  Staging: (c) DTPI                  88 y/o M with history of A. Fib s/p st. Sven Pacemaker insertion, thrombocytopenia, NIDDM-2, diabatic neuropathy, Parkinson's Disease, CKD-II and HTN presents to the ED via EMS from [facility] for dyspnea. History is obtained entirely from chart and discussion with ED staff since patient is unable to provide history. The patient reportedly has been feeling fatigued with decreased appetite over since yesterday. Today he developed dyspnea associated with respiratory distress so was sent to the ED.        Feel this wound may be related to skin changes at [facility]   Presents as a 17d71bm purple blistered area which may further manifest. As this wound is in evolution , we are unable to fully stage the extent of the tissue damage at this time.   Also, noted linear red non blanching area to left lower buttock measuring 10x4cm.  Will apply Aquacel foam to both areas of concern.    Progress Notes Wound Care 3/25/20             H&P Hosp Med 3/25/20                Progress Notes Wound Care 3/25/20                          Progress Notes Wound Care 3/25/20               Do you agree with the Consultants diagnosis of __Pressure Injury Present on Admission: yes  Orientation: medial   Location: Sacral spine  Is this injury device related?: No  Staging: (c) DTPI_?    [ x ] Yes   [  ] No   [  ] Other/Clarification of findings:   [  ] Clinically undetermined

## 2020-05-04 NOTE — SUBJECTIVE & OBJECTIVE
Interval History:  The patient has no complaints today.  The patient has large watery stools.    Review of Systems   Gastrointestinal: Positive for diarrhea.     Objective:     Vital Signs (Most Recent):  Temp: 98.6 °F (37 °C) (04/09/19 1128)  Pulse: 70 (04/09/19 1411)  Resp: 18 (04/09/19 1411)  BP: 137/83 (04/09/19 1128)  SpO2: 95 % (04/09/19 1411) Vital Signs (24h Range):  Temp:  [97 °F (36.1 °C)-98.6 °F (37 °C)] 98.6 °F (37 °C)  Pulse:  [63-77] 70  Resp:  [16-28] 18  SpO2:  [92 %-96 %] 95 %  BP: (130-160)/(77-88) 137/83     Weight: 100.7 kg (222 lb)  Body mass index is 30.96 kg/m².    Intake/Output Summary (Last 24 hours) at 4/9/2019 1529  Last data filed at 4/9/2019 1448  Gross per 24 hour   Intake 2690 ml   Output 1350 ml   Net 1340 ml      Physical Exam   Constitutional: He is oriented to person, place, and time. He appears well-developed. No distress.   HENT:   Head: Normocephalic and atraumatic.   Eyes: Conjunctivae and EOM are normal.   Cardiovascular: Normal rate and intact distal pulses.   Pulmonary/Chest: Effort normal. No respiratory distress.   Abdominal: Soft. Bowel sounds are normal. There is no tenderness.   Musculoskeletal: Normal range of motion. He exhibits edema (3+ BLE; minimal erythema of LLE with trace warmth).   Neurological: He is alert and oriented to person, place, and time.   Skin: Skin is warm and dry.   Psychiatric: He has a normal mood and affect. His behavior is normal.   Nursing note and vitals reviewed.      Significant Labs:   CBC:   Recent Labs   Lab 04/08/19  1324 04/09/19  0617   WBC 15.29* 8.81   HGB 15.4 13.5*   HCT 46.1 40.9   * 116*     CMP:   Recent Labs   Lab 04/08/19  1324 04/08/19  2108 04/09/19  0617    140 139   K 3.6 3.4* 3.6    107 107   CO2 28 21* 23   GLU 90 96 85   BUN 20 17 18   CREATININE 1.3 1.1 1.1   CALCIUM 9.9 8.8 8.7   PROT 7.7  --   --    ALBUMIN 4.0  --   --    BILITOT 1.9*  --   --    ALKPHOS 111  --   --    AST 25  --   --    ALT 15   PT GIVES CONSENT FOR DOXIMITY   --   --    ANIONGAP 10 12 9   EGFRNONAA 49* 60 60     Magnesium:   Recent Labs   Lab 04/08/19  2108 04/09/19  0617   MG 1.7 1.6

## 2020-06-29 PROBLEM — J12.9 VIRAL PNEUMONIA: Status: RESOLVED | Noted: 2020-03-25 | Resolved: 2020-06-29

## 2021-04-16 ENCOUNTER — PATIENT MESSAGE (OUTPATIENT)
Dept: RESEARCH | Facility: HOSPITAL | Age: 86
End: 2021-04-16

## 2022-05-05 ENCOUNTER — PATIENT MESSAGE (OUTPATIENT)
Dept: RESEARCH | Facility: HOSPITAL | Age: 87
End: 2022-05-05
Payer: MEDICARE

## 2023-12-13 NOTE — TRANSFER OF CARE
Anesthesia Transfer of Care Note    Patient: Silvano Parmar    Procedure(s) Performed: Procedure(s) (LRB):  ORIF, FRACTURE, FEMUR, INTERTROCHANTERIC (Left)    Patient location: PACU    Anesthesia Type: general    Transport from OR: Transported from OR on 2-3 L/min O2 by NC with adequate spontaneous ventilation    Post pain: adequate analgesia    Post assessment: no apparent anesthetic complications and tolerated procedure well    Post vital signs: stable    Level of consciousness: awake, responds to stimulation and lethargic    Nausea/Vomiting: no nausea/vomiting    Complications: none          Last vitals:   Visit Vitals  /78 (BP Location: Right arm, Patient Position: Lying)   Pulse 76   Temp 36.4 °C (97.6 °F) (Oral)   Resp 12   Ht 6' (1.829 m)   Wt 95.3 kg (210 lb 1.6 oz)   SpO2 100%   BMI 28.49 kg/m²     
DISPLAY PLAN FREE TEXT

## (undated) DEVICE — SUT MONOCRYL 4-0 PS-2

## (undated) DEVICE — GLOVE BIOGEL SKINSENSE PI 6.5

## (undated) DEVICE — DRAPE C ARM 42 X 120 10/BX

## (undated) DEVICE — REAMER STEPPED DHS DCS

## (undated) DEVICE — GLOVE BIOGEL SKINSENSE PI 8.0

## (undated) DEVICE — DRAPE STERI-DRAPE 1000 17X11IN

## (undated) DEVICE — DRAPE C-ARMOR EQUIPMENT COVER

## (undated) DEVICE — SEE MEDLINE ITEM 152530

## (undated) DEVICE — SEE MEDLINE ITEM 153151

## (undated) DEVICE — PAD CAST SPECIALIST STRL 6

## (undated) DEVICE — ADHESIVE DERMABOND ADVANCED

## (undated) DEVICE — DRESSING AQUACEL AG ADV 3.5X6

## (undated) DEVICE — UNDERGLOVES BIOGEL PI SIZE 8

## (undated) DEVICE — SUT 2-0 VICRYL / CT-1

## (undated) DEVICE — SOL 9P NACL IRR PIC IL

## (undated) DEVICE — ELECTRODE REM PLYHSV RETURN 9

## (undated) DEVICE — ALCOHOL 70% ISOP W/GREEN 16OZ

## (undated) DEVICE — BRUSH SCRUB HIBICLENS 4%

## (undated) DEVICE — TAPE SILK 3IN

## (undated) DEVICE — SEE MEDLINE ITEM 157131

## (undated) DEVICE — DRAPE XRAY EQUIPMENT UNIV

## (undated) DEVICE — Device

## (undated) DEVICE — UNDERGLOVE BIOGEL PI SZ 6.5 LF

## (undated) DEVICE — APPLICATOR CHLORAPREP ORN 26ML

## (undated) DEVICE — DRAPE STERI-DRAPE 83X125 IOBAN

## (undated) DEVICE — POSITIONER IV ARMBOARD FOAM

## (undated) DEVICE — GLOVE BIOGEL SKINSENSE PI 7.0

## (undated) DEVICE — BLANKET MAXI-THERM PED 25X33IN

## (undated) DEVICE — BIT DRILL QC 3FLUTED 4.2X145 S

## (undated) DEVICE — DRAPE STERI U-SHAPED 47X51IN

## (undated) DEVICE — COVER BACK TABLE 72X21